# Patient Record
Sex: FEMALE | Race: BLACK OR AFRICAN AMERICAN | Employment: OTHER | URBAN - METROPOLITAN AREA
[De-identification: names, ages, dates, MRNs, and addresses within clinical notes are randomized per-mention and may not be internally consistent; named-entity substitution may affect disease eponyms.]

---

## 2022-08-26 ENCOUNTER — APPOINTMENT (OUTPATIENT)
Dept: CT IMAGING | Age: 62
DRG: 280 | End: 2022-08-26

## 2022-08-26 ENCOUNTER — HOSPITAL ENCOUNTER (INPATIENT)
Age: 62
LOS: 3 days | Discharge: ANOTHER HOSPITAL | DRG: 280 | End: 2022-08-29
Attending: EMERGENCY MEDICINE | Admitting: INTERNAL MEDICINE

## 2022-08-26 ENCOUNTER — APPOINTMENT (OUTPATIENT)
Dept: GENERAL RADIOLOGY | Age: 62
DRG: 280 | End: 2022-08-26

## 2022-08-26 DIAGNOSIS — I21.4 NSTEMI (NON-ST ELEVATED MYOCARDIAL INFARCTION) (HCC): Primary | ICD-10-CM

## 2022-08-26 LAB
ANION GAP SERPL CALCULATED.3IONS-SCNC: 15 MMOL/L (ref 7–16)
ANISOCYTOSIS: ABNORMAL
BASOPHILS ABSOLUTE: 0.05 E9/L (ref 0–0.2)
BASOPHILS RELATIVE PERCENT: 0.9 % (ref 0–2)
BUN BLDV-MCNC: 16 MG/DL (ref 6–23)
BURR CELLS: ABNORMAL
CALCIUM SERPL-MCNC: 9.1 MG/DL (ref 8.6–10.2)
CHLORIDE BLD-SCNC: 107 MMOL/L (ref 98–107)
CO2: 17 MMOL/L (ref 22–29)
CREAT SERPL-MCNC: 1.2 MG/DL (ref 0.5–1)
D DIMER: 1164 NG/ML DDU
EOSINOPHILS ABSOLUTE: 0.16 E9/L (ref 0.05–0.5)
EOSINOPHILS RELATIVE PERCENT: 2.6 % (ref 0–6)
GFR AFRICAN AMERICAN: 55
GFR NON-AFRICAN AMERICAN: 55 ML/MIN/1.73
GLUCOSE BLD-MCNC: 255 MG/DL (ref 74–99)
HCT VFR BLD CALC: 44.2 % (ref 34–48)
HCT VFR BLD CALC: 45.5 % (ref 34–48)
HEMOGLOBIN: 14.3 G/DL (ref 11.5–15.5)
HEMOGLOBIN: 14.5 G/DL (ref 11.5–15.5)
LYMPHOCYTES ABSOLUTE: 1.14 E9/L (ref 1.5–4)
LYMPHOCYTES RELATIVE PERCENT: 19.3 % (ref 20–42)
MCH RBC QN AUTO: 27.8 PG (ref 26–35)
MCH RBC QN AUTO: 27.8 PG (ref 26–35)
MCHC RBC AUTO-ENTMCNC: 31.9 % (ref 32–34.5)
MCHC RBC AUTO-ENTMCNC: 32.4 % (ref 32–34.5)
MCV RBC AUTO: 85.8 FL (ref 80–99.9)
MCV RBC AUTO: 87.2 FL (ref 80–99.9)
MONOCYTES ABSOLUTE: 0.12 E9/L (ref 0.1–0.95)
MONOCYTES RELATIVE PERCENT: 1.8 % (ref 2–12)
NEUTROPHILS ABSOLUTE: 4.5 E9/L (ref 1.8–7.3)
NEUTROPHILS RELATIVE PERCENT: 75.4 % (ref 43–80)
PDW BLD-RTO: 16.2 FL (ref 11.5–15)
PDW BLD-RTO: 16.9 FL (ref 11.5–15)
PLATELET # BLD: 326 E9/L (ref 130–450)
PLATELET # BLD: 336 E9/L (ref 130–450)
PMV BLD AUTO: 11 FL (ref 7–12)
PMV BLD AUTO: 11.3 FL (ref 7–12)
POIKILOCYTES: ABNORMAL
POLYCHROMASIA: ABNORMAL
POTASSIUM REFLEX MAGNESIUM: 4 MMOL/L (ref 3.5–5)
PRO-BNP: ABNORMAL PG/ML (ref 0–125)
RBC # BLD: 5.15 E12/L (ref 3.5–5.5)
RBC # BLD: 5.22 E12/L (ref 3.5–5.5)
REASON FOR REJECTION: NORMAL
REJECTED TEST: NORMAL
SODIUM BLD-SCNC: 139 MMOL/L (ref 132–146)
TARGET CELLS: ABNORMAL
TEAR DROP CELLS: ABNORMAL
TROPONIN, HIGH SENSITIVITY: 122 NG/L (ref 0–9)
TROPONIN, HIGH SENSITIVITY: 486 NG/L (ref 0–9)
WBC # BLD: 5.8 E9/L (ref 4.5–11.5)
WBC # BLD: 6 E9/L (ref 4.5–11.5)

## 2022-08-26 PROCEDURE — 83880 ASSAY OF NATRIURETIC PEPTIDE: CPT

## 2022-08-26 PROCEDURE — 83036 HEMOGLOBIN GLYCOSYLATED A1C: CPT

## 2022-08-26 PROCEDURE — 94644 CONT INHLJ TX 1ST HOUR: CPT

## 2022-08-26 PROCEDURE — 6360000004 HC RX CONTRAST MEDICATION: Performed by: RADIOLOGY

## 2022-08-26 PROCEDURE — 80076 HEPATIC FUNCTION PANEL: CPT

## 2022-08-26 PROCEDURE — 99223 1ST HOSP IP/OBS HIGH 75: CPT | Performed by: HOSPITALIST

## 2022-08-26 PROCEDURE — 84100 ASSAY OF PHOSPHORUS: CPT

## 2022-08-26 PROCEDURE — 85027 COMPLETE CBC AUTOMATED: CPT

## 2022-08-26 PROCEDURE — 2060000000 HC ICU INTERMEDIATE R&B

## 2022-08-26 PROCEDURE — 99285 EMERGENCY DEPT VISIT HI MDM: CPT

## 2022-08-26 PROCEDURE — 84484 ASSAY OF TROPONIN QUANT: CPT

## 2022-08-26 PROCEDURE — 80061 LIPID PANEL: CPT

## 2022-08-26 PROCEDURE — 6370000000 HC RX 637 (ALT 250 FOR IP): Performed by: EMERGENCY MEDICINE

## 2022-08-26 PROCEDURE — 71275 CT ANGIOGRAPHY CHEST: CPT

## 2022-08-26 PROCEDURE — 80048 BASIC METABOLIC PNL TOTAL CA: CPT

## 2022-08-26 PROCEDURE — 71045 X-RAY EXAM CHEST 1 VIEW: CPT

## 2022-08-26 PROCEDURE — 83735 ASSAY OF MAGNESIUM: CPT

## 2022-08-26 PROCEDURE — 85025 COMPLETE CBC W/AUTO DIFF WBC: CPT

## 2022-08-26 PROCEDURE — 85378 FIBRIN DEGRADE SEMIQUANT: CPT

## 2022-08-26 PROCEDURE — 93005 ELECTROCARDIOGRAM TRACING: CPT | Performed by: EMERGENCY MEDICINE

## 2022-08-26 PROCEDURE — 36415 COLL VENOUS BLD VENIPUNCTURE: CPT

## 2022-08-26 PROCEDURE — 85730 THROMBOPLASTIN TIME PARTIAL: CPT

## 2022-08-26 RX ORDER — ACETAMINOPHEN 650 MG/1
650 SUPPOSITORY RECTAL EVERY 6 HOURS PRN
Status: DISCONTINUED | OUTPATIENT
Start: 2022-08-26 | End: 2022-08-29 | Stop reason: HOSPADM

## 2022-08-26 RX ORDER — NITROGLYCERIN 0.4 MG/1
0.4 TABLET SUBLINGUAL EVERY 5 MIN PRN
Status: DISCONTINUED | OUTPATIENT
Start: 2022-08-26 | End: 2022-08-29 | Stop reason: HOSPADM

## 2022-08-26 RX ORDER — ONDANSETRON 2 MG/ML
4 INJECTION INTRAMUSCULAR; INTRAVENOUS EVERY 6 HOURS PRN
Status: DISCONTINUED | OUTPATIENT
Start: 2022-08-26 | End: 2022-08-29 | Stop reason: HOSPADM

## 2022-08-26 RX ORDER — HEPARIN SODIUM 1000 [USP'U]/ML
2000 INJECTION, SOLUTION INTRAVENOUS; SUBCUTANEOUS PRN
Status: DISCONTINUED | OUTPATIENT
Start: 2022-08-26 | End: 2022-08-29 | Stop reason: HOSPADM

## 2022-08-26 RX ORDER — ASPIRIN 81 MG/1
324 TABLET, CHEWABLE ORAL ONCE
Status: COMPLETED | OUTPATIENT
Start: 2022-08-26 | End: 2022-08-27

## 2022-08-26 RX ORDER — IPRATROPIUM BROMIDE AND ALBUTEROL SULFATE 2.5; .5 MG/3ML; MG/3ML
3 SOLUTION RESPIRATORY (INHALATION) ONCE
Status: COMPLETED | OUTPATIENT
Start: 2022-08-26 | End: 2022-08-26

## 2022-08-26 RX ORDER — ACETAMINOPHEN 325 MG/1
650 TABLET ORAL EVERY 6 HOURS PRN
Status: DISCONTINUED | OUTPATIENT
Start: 2022-08-26 | End: 2022-08-29 | Stop reason: HOSPADM

## 2022-08-26 RX ORDER — HEPARIN SODIUM 1000 [USP'U]/ML
4000 INJECTION, SOLUTION INTRAVENOUS; SUBCUTANEOUS PRN
Status: DISCONTINUED | OUTPATIENT
Start: 2022-08-26 | End: 2022-08-29 | Stop reason: HOSPADM

## 2022-08-26 RX ORDER — HEPARIN SODIUM 10000 [USP'U]/100ML
5-30 INJECTION, SOLUTION INTRAVENOUS CONTINUOUS
Status: DISCONTINUED | OUTPATIENT
Start: 2022-08-26 | End: 2022-08-27 | Stop reason: SDUPTHER

## 2022-08-26 RX ORDER — ONDANSETRON 4 MG/1
4 TABLET, ORALLY DISINTEGRATING ORAL EVERY 8 HOURS PRN
Status: DISCONTINUED | OUTPATIENT
Start: 2022-08-26 | End: 2022-08-29 | Stop reason: HOSPADM

## 2022-08-26 RX ORDER — ATORVASTATIN CALCIUM 40 MG/1
80 TABLET, FILM COATED ORAL NIGHTLY
Status: DISCONTINUED | OUTPATIENT
Start: 2022-08-26 | End: 2022-08-29 | Stop reason: HOSPADM

## 2022-08-26 RX ORDER — HEPARIN SODIUM 1000 [USP'U]/ML
4000 INJECTION, SOLUTION INTRAVENOUS; SUBCUTANEOUS ONCE
Status: COMPLETED | OUTPATIENT
Start: 2022-08-26 | End: 2022-08-27

## 2022-08-26 RX ADMIN — IOPAMIDOL 75 ML: 755 INJECTION, SOLUTION INTRAVENOUS at 20:53

## 2022-08-26 RX ADMIN — IPRATROPIUM BROMIDE AND ALBUTEROL SULFATE 3 AMPULE: .5; 3 SOLUTION RESPIRATORY (INHALATION) at 17:44

## 2022-08-26 ASSESSMENT — ENCOUNTER SYMPTOMS
RHINORRHEA: 0
COUGH: 0
SHORTNESS OF BREATH: 1
BLOOD IN STOOL: 0
COLOR CHANGE: 0
NAUSEA: 0
VOMITING: 0
PHOTOPHOBIA: 0
ABDOMINAL PAIN: 0
DIARRHEA: 0

## 2022-08-26 ASSESSMENT — PAIN DESCRIPTION - ORIENTATION: ORIENTATION: RIGHT;LEFT

## 2022-08-26 ASSESSMENT — PAIN - FUNCTIONAL ASSESSMENT
PAIN_FUNCTIONAL_ASSESSMENT: NONE - DENIES PAIN
PAIN_FUNCTIONAL_ASSESSMENT: 0-10

## 2022-08-26 ASSESSMENT — PAIN DESCRIPTION - DESCRIPTORS: DESCRIPTORS: TINGLING

## 2022-08-26 ASSESSMENT — PAIN DESCRIPTION - LOCATION: LOCATION: CHEST;LEG

## 2022-08-26 NOTE — ED NOTES
EKG completed at this time in H4. PT o2 sats 86-88% pt moved into room 6 by this tech and placed on 2 liters of 02 nasal cannula at this time o2 sats 98% at this time. PT placed on monitor and continuous telemetry at this time.       Lane Ratel  08/26/22 [phone]

## 2022-08-26 NOTE — ED PROVIDER NOTES
Presents to the ED for evaluation of shortness of breath. Patient states that over the past 2 to 3 days she has been getting more short of breath. Patient denies the presence of chest pain. She denies cough. Denies fever or chills. She denies history of COPD but states she was a smoker. She quit last month. Symptoms moderate in severity. Does not wear supplemental oxygen at home. Symptoms worse with exertion and improved with rest.  She states that she lays on her side and does not report being short of breath when she does so. She denied any abdominal pain, nausea, vomiting or diarrhea to me. Denies any recent travel or history of blood clots. She does report new swelling in her lower extremities. Denies any calf tenderness. No history of blood clots       Review of Systems   Constitutional:  Positive for fatigue. Negative for chills, diaphoresis and fever. HENT:  Negative for congestion and rhinorrhea. Eyes:  Negative for photophobia and visual disturbance. Respiratory:  Positive for shortness of breath. Negative for cough. Cardiovascular:  Positive for leg swelling. Negative for chest pain and palpitations. Gastrointestinal:  Negative for abdominal pain, blood in stool, diarrhea, nausea and vomiting. Genitourinary:  Negative for decreased urine volume, difficulty urinating and hematuria. Musculoskeletal:  Negative for arthralgias and myalgias. Skin:  Positive for pallor (per the patient). Negative for color change. Neurological:  Negative for dizziness, syncope, weakness, light-headedness and numbness. Hematological:  Does not bruise/bleed easily. Psychiatric/Behavioral:  Negative for confusion. All other systems reviewed and are negative. Physical Exam  Vitals and nursing note reviewed. Constitutional:       General: She is not in acute distress. Appearance: She is well-developed and normal weight. She is not ill-appearing or diaphoretic.    HENT:      Head: Normocephalic and atraumatic. Eyes:      Conjunctiva/sclera: Conjunctivae normal.   Cardiovascular:      Rate and Rhythm: Regular rhythm. Tachycardia present. Heart sounds: Normal heart sounds. No murmur heard. Pulmonary:      Effort: Pulmonary effort is normal. No respiratory distress (No accessory muscle use, conversational dyspnea, or tachypnea. ). Breath sounds: Examination of the right-middle field reveals rales. Examination of the left-middle field reveals rales. Examination of the right-lower field reveals rales. Examination of the left-lower field reveals rales. Rales present. No decreased breath sounds, wheezing or rhonchi. Abdominal:      General: Bowel sounds are normal.      Palpations: Abdomen is soft. Tenderness: There is no abdominal tenderness. There is no guarding or rebound. Musculoskeletal:      Cervical back: Normal range of motion and neck supple. Comments: 1-2+ pitting edema bilateral lower extremities. No calf tenderness or increased warmth to palpation. Skin:     General: Skin is warm and dry. Coloration: Skin is not cyanotic or pale. Neurological:      Mental Status: She is alert and oriented to person, place, and time. Coordination: Coordination normal.        Procedures     MDM  Presented to the ED for evaluation of shortness of breath. Symptoms of been gradually worsening over the past 2 to 3 days. No prior history of COPD but does have a prior history of smoking. Labs and imaging were assessed. CBC showed no evidence of anemia or leukocytosis. BMP showed no electrolyte abnormalities. Mild renal insufficiency with a creatinine of 1.2. Patient's initial troponin was elevated 122. A repeat troponin was obtained and was 486. Patient was started on low-dose heparin protocol. While the patient was here he also had an elevated D-dimer at 1164. CT of the chest was then ordered which showed no evidence of PE or acute pulmonary abnormality.   I did consult medicine and cardiology. Cardiology was agreeable to the plan and is okay with the patient staying here. They will consult in the morning. She has been chest pain-free while in the ED. Has no symptoms at this time. EKG Interpretation    Interpreted by emergency department physician    Rhythm: sinus tachycardia  Rate: 105  Axis: left  Ectopy: none  Conduction: Possible left atrial enlargement  ST Segments: normal  T Waves: normal  Q Waves: none    Clinical Impression: sinus tachycardia, no prior EKGs for comparison    Rossy Seaman DO      ED Course as of 08/26/22 2311   Fri Aug 26, 2022 2033 Discussed with patient that her initial troponin was elevated. We are waiting repeat troponin. She has no chest pain. Feels better after breathing treatment. [MS]   1029 Resting in bed no distress. Has no complaint this time. Discussed results of labs thus far. Waiting CTA of chest. [MS]      ED Course User Index  [MS] Rossy Seaman DO       --------------------------------------------- PAST HISTORY ---------------------------------------------  Past Medical History:  has no past medical history on file. Past Surgical History:  has no past surgical history on file. Social History:  reports that she has been smoking cigarettes. She does not have any smokeless tobacco history on file. She reports that she does not currently use alcohol. She reports that she does not currently use drugs. Family History: family history is not on file. The patients home medications have been reviewed. Allergies: Patient has no known allergies.     -------------------------------------------------- RESULTS -------------------------------------------------    LABS:  Results for orders placed or performed during the hospital encounter of 08/26/22   CBC with Auto Differential   Result Value Ref Range    WBC 6.0 4.5 - 11.5 E9/L    RBC 5.22 3.50 - 5.50 E12/L    Hemoglobin 14.5 11.5 - 15.5 g/dL    Hematocrit 45.5 congestion.                   ------------------------- NURSING NOTES AND VITALS REVIEWED ---------------------------  Date / Time Roomed:  8/26/2022  5:13 PM  ED Bed Assignment:  06/06    The nursing notes within the ED encounter and vital signs as below have been reviewed.      Patient Vitals for the past 24 hrs:   BP Temp Temp src Pulse Resp SpO2 Height Weight   08/26/22 2258 (!) 124/104 -- -- 95 22 99 % -- --   08/26/22 2219 -- -- -- 96 -- 96 % -- --   08/26/22 2149 -- -- -- 96 -- 98 % -- --   08/26/22 2143 (!) 174/129 -- -- 99 -- 97 % -- --   08/26/22 2119 -- -- -- 97 -- 99 % -- --   08/26/22 2115 (!) 182/123 -- -- 96 18 98 % -- --   08/26/22 2113 (!) 182/123 -- -- 98 18 (!) 85 % -- --   08/26/22 2049 -- -- -- 98 -- 94 % -- --   08/26/22 2043 -- -- -- 99 -- 90 % -- --   08/26/22 2013 -- -- -- 100 -- 94 % -- --   08/26/22 2000 (!) 186/133 -- -- 100 22 98 % 5' (1.524 m) 165 lb (74.8 kg)   08/26/22 1943 -- -- -- (!) 101 19 93 % -- --   08/26/22 1913 -- -- -- (!) 101 -- 92 % -- --   08/26/22 1900 -- -- -- (!) 103 (!) 48 92 % -- --   08/26/22 1845 -- -- -- (!) 104 (!) 35 (!) 89 % -- --   08/26/22 1830 -- -- -- (!) 102 27 96 % -- --   08/26/22 1815 -- -- -- (!) 104 (!) 53 92 % -- --   08/26/22 1801 (!) 184/137 -- -- (!) 106 (!) 57 95 % -- --   08/26/22 1800 -- -- -- (!) 105 27 98 % -- --   08/26/22 1745 -- -- -- (!) 105 (!) 34 99 % -- --   08/26/22 1730 -- -- -- (!) 105 (!) 34 (!) 88 % -- --   08/26/22 1727 (!) 195/117 98.6 °F (37 °C) Oral (!) 107 20 90 % -- --   08/26/22 1714 (!) 181/102 -- -- -- 25 -- -- 165 lb (74.8 kg)   08/26/22 1656 -- 97.6 °F (36.4 °C) Oral (!) 109 -- 98 % -- --       Oxygen Saturation Interpretation: Normal    ------------------------------------------ PROGRESS NOTES ------------------------------------------  Re-evaluation(s):  Refer to ED course above    Counseling:  I have spoken with the patient and discussed todays results, in addition to providing specific details for the plan of care and counseling regarding the diagnosis and prognosis. Their questions are answered at this time and they are agreeable with the plan of admission. Critical care:  Please note that the withdrawal or failure to initiate urgent interventions for this patient would likely result in a life threatening deterioration or permanent disability. Systems at risk for deterioration include: NSTEMI requiring heparin bolus and infusion    Accordingly this patient received 35 minutes of critical care time, excluding separately billable procedures. --------------------------------- ADDITIONAL PROVIDER NOTES ---------------------------------  Consultations:  Time: 9745. Spoke with Dr. Mae. Discussed case. He will admit the patient. Time: 2305. Spoke with Dr. Rosa Nunez (Cardiology). Discussed case. He is agreeable to the patient staying here and they will consult her in the morning. This patient's ED course included: a personal history and physicial examination, re-evaluation prior to disposition, multiple bedside re-evaluations, IV medications, cardiac monitoring, continuous pulse oximetry, and complex medical decision making and emergency management    This patient has remained hemodynamically stable during their ED course. Diagnosis:  1. NSTEMI (non-ST elevated myocardial infarction) ([facility] [address]        Disposition:  Patient's disposition: Admit to [facility]  Patient's condition is fair.           Camden Newton DO  08/26/22 1126

## 2022-08-27 LAB
ALBUMIN SERPL-MCNC: 3.6 G/DL (ref 3.5–5.2)
ALP BLD-CCNC: 112 U/L (ref 35–104)
ALT SERPL-CCNC: 23 U/L (ref 0–32)
ANION GAP SERPL CALCULATED.3IONS-SCNC: 15 MMOL/L (ref 7–16)
APTT: 100.4 SEC (ref 24.5–35.1)
APTT: 28.2 SEC (ref 24.5–35.1)
APTT: 97.4 SEC (ref 24.5–35.1)
AST SERPL-CCNC: 51 U/L (ref 0–31)
BASOPHILS ABSOLUTE: 0.04 E9/L (ref 0–0.2)
BASOPHILS RELATIVE PERCENT: 0.6 % (ref 0–2)
BILIRUB SERPL-MCNC: 0.9 MG/DL (ref 0–1.2)
BILIRUBIN DIRECT: 0.4 MG/DL (ref 0–0.3)
BILIRUBIN, INDIRECT: 0.5 MG/DL (ref 0–1)
BUN BLDV-MCNC: 14 MG/DL (ref 6–23)
CALCIUM SERPL-MCNC: 9 MG/DL (ref 8.6–10.2)
CHLORIDE BLD-SCNC: 105 MMOL/L (ref 98–107)
CHOLESTEROL, TOTAL: 163 MG/DL (ref 0–199)
CO2: 18 MMOL/L (ref 22–29)
CREAT SERPL-MCNC: 0.9 MG/DL (ref 0.5–1)
EKG ATRIAL RATE: 105 BPM
EKG P AXIS: 33 DEGREES
EKG P-R INTERVAL: 152 MS
EKG Q-T INTERVAL: 324 MS
EKG QRS DURATION: 74 MS
EKG QTC CALCULATION (BAZETT): 428 MS
EKG R AXIS: -26 DEGREES
EKG T AXIS: 92 DEGREES
EKG VENTRICULAR RATE: 105 BPM
EOSINOPHILS ABSOLUTE: 0 E9/L (ref 0.05–0.5)
EOSINOPHILS RELATIVE PERCENT: 0 % (ref 0–6)
GFR AFRICAN AMERICAN: >60
GFR NON-AFRICAN AMERICAN: >60 ML/MIN/1.73
GLUCOSE BLD-MCNC: 157 MG/DL (ref 74–99)
HBA1C MFR BLD: 7.1 % (ref 4–5.6)
HCT VFR BLD CALC: 42.8 % (ref 34–48)
HDLC SERPL-MCNC: 29 MG/DL
HEMOGLOBIN: 14.3 G/DL (ref 11.5–15.5)
IMMATURE GRANULOCYTES #: 0.03 E9/L
IMMATURE GRANULOCYTES %: 0.4 % (ref 0–5)
LDL CHOLESTEROL CALCULATED: 113 MG/DL (ref 0–99)
LV EF: 10 %
LVEF MODALITY: NORMAL
LYMPHOCYTES ABSOLUTE: 1.46 E9/L (ref 1.5–4)
LYMPHOCYTES RELATIVE PERCENT: 20.7 % (ref 20–42)
MAGNESIUM: 2 MG/DL (ref 1.6–2.6)
MCH RBC QN AUTO: 28.3 PG (ref 26–35)
MCHC RBC AUTO-ENTMCNC: 33.4 % (ref 32–34.5)
MCV RBC AUTO: 84.6 FL (ref 80–99.9)
MONOCYTES ABSOLUTE: 0.69 E9/L (ref 0.1–0.95)
MONOCYTES RELATIVE PERCENT: 9.8 % (ref 2–12)
NEUTROPHILS ABSOLUTE: 4.84 E9/L (ref 1.8–7.3)
NEUTROPHILS RELATIVE PERCENT: 68.5 % (ref 43–80)
PDW BLD-RTO: 16.3 FL (ref 11.5–15)
PHOSPHORUS: 4.5 MG/DL (ref 2.5–4.5)
PLATELET # BLD: 315 E9/L (ref 130–450)
PMV BLD AUTO: 11.3 FL (ref 7–12)
POTASSIUM REFLEX MAGNESIUM: 3.9 MMOL/L (ref 3.5–5)
RBC # BLD: 5.06 E12/L (ref 3.5–5.5)
SODIUM BLD-SCNC: 138 MMOL/L (ref 132–146)
TOTAL PROTEIN: 8.5 G/DL (ref 6.4–8.3)
TRIGL SERPL-MCNC: 107 MG/DL (ref 0–149)
TROPONIN, HIGH SENSITIVITY: 436 NG/L (ref 0–9)
TROPONIN, HIGH SENSITIVITY: 584 NG/L (ref 0–9)
VLDLC SERPL CALC-MCNC: 21 MG/DL
WBC # BLD: 7.1 E9/L (ref 4.5–11.5)

## 2022-08-27 PROCEDURE — 6370000000 HC RX 637 (ALT 250 FOR IP): Performed by: EMERGENCY MEDICINE

## 2022-08-27 PROCEDURE — 93306 TTE W/DOPPLER COMPLETE: CPT

## 2022-08-27 PROCEDURE — 36415 COLL VENOUS BLD VENIPUNCTURE: CPT

## 2022-08-27 PROCEDURE — 85730 THROMBOPLASTIN TIME PARTIAL: CPT

## 2022-08-27 PROCEDURE — 6370000000 HC RX 637 (ALT 250 FOR IP): Performed by: INTERNAL MEDICINE

## 2022-08-27 PROCEDURE — 6360000002 HC RX W HCPCS: Performed by: INTERNAL MEDICINE

## 2022-08-27 PROCEDURE — 84484 ASSAY OF TROPONIN QUANT: CPT

## 2022-08-27 PROCEDURE — 6360000004 HC RX CONTRAST MEDICATION: Performed by: HOSPITALIST

## 2022-08-27 PROCEDURE — 80048 BASIC METABOLIC PNL TOTAL CA: CPT

## 2022-08-27 PROCEDURE — 2500000003 HC RX 250 WO HCPCS: Performed by: HOSPITALIST

## 2022-08-27 PROCEDURE — 6360000002 HC RX W HCPCS: Performed by: EMERGENCY MEDICINE

## 2022-08-27 PROCEDURE — 2580000003 HC RX 258: Performed by: HOSPITALIST

## 2022-08-27 PROCEDURE — 6360000002 HC RX W HCPCS: Performed by: HOSPITALIST

## 2022-08-27 PROCEDURE — 99233 SBSQ HOSP IP/OBS HIGH 50: CPT | Performed by: INTERNAL MEDICINE

## 2022-08-27 PROCEDURE — 85025 COMPLETE CBC W/AUTO DIFF WBC: CPT

## 2022-08-27 PROCEDURE — 2060000000 HC ICU INTERMEDIATE R&B

## 2022-08-27 PROCEDURE — 6370000000 HC RX 637 (ALT 250 FOR IP): Performed by: HOSPITALIST

## 2022-08-27 RX ORDER — SODIUM CHLORIDE 0.9 % (FLUSH) 0.9 %
5-40 SYRINGE (ML) INJECTION EVERY 12 HOURS SCHEDULED
Status: DISCONTINUED | OUTPATIENT
Start: 2022-08-27 | End: 2022-08-29 | Stop reason: HOSPADM

## 2022-08-27 RX ORDER — MAGNESIUM SULFATE IN WATER 40 MG/ML
2000 INJECTION, SOLUTION INTRAVENOUS PRN
Status: DISCONTINUED | OUTPATIENT
Start: 2022-08-27 | End: 2022-08-29 | Stop reason: HOSPADM

## 2022-08-27 RX ORDER — AMLODIPINE BESYLATE 10 MG/1
20 TABLET ORAL DAILY
COMMUNITY
End: 2022-08-30

## 2022-08-27 RX ORDER — CALCIUM CARBONATE 200(500)MG
500 TABLET,CHEWABLE ORAL 3 TIMES DAILY PRN
Status: DISCONTINUED | OUTPATIENT
Start: 2022-08-27 | End: 2022-08-29 | Stop reason: HOSPADM

## 2022-08-27 RX ORDER — HEPARIN SODIUM 10000 [USP'U]/100ML
5-30 INJECTION, SOLUTION INTRAVENOUS CONTINUOUS
Status: DISCONTINUED | OUTPATIENT
Start: 2022-08-27 | End: 2022-08-29 | Stop reason: HOSPADM

## 2022-08-27 RX ORDER — POTASSIUM CHLORIDE 20 MEQ/1
40 TABLET, EXTENDED RELEASE ORAL PRN
Status: DISCONTINUED | OUTPATIENT
Start: 2022-08-27 | End: 2022-08-29 | Stop reason: HOSPADM

## 2022-08-27 RX ORDER — HYDRALAZINE HYDROCHLORIDE 20 MG/ML
10 INJECTION INTRAMUSCULAR; INTRAVENOUS EVERY 4 HOURS PRN
Status: DISCONTINUED | OUTPATIENT
Start: 2022-08-27 | End: 2022-08-28

## 2022-08-27 RX ORDER — SODIUM CHLORIDE 0.9 % (FLUSH) 0.9 %
5-40 SYRINGE (ML) INJECTION PRN
Status: DISCONTINUED | OUTPATIENT
Start: 2022-08-27 | End: 2022-08-29 | Stop reason: HOSPADM

## 2022-08-27 RX ORDER — FUROSEMIDE 10 MG/ML
40 INJECTION INTRAMUSCULAR; INTRAVENOUS 2 TIMES DAILY
Status: DISCONTINUED | OUTPATIENT
Start: 2022-08-27 | End: 2022-08-29 | Stop reason: HOSPADM

## 2022-08-27 RX ORDER — SODIUM CHLORIDE 9 MG/ML
INJECTION, SOLUTION INTRAVENOUS PRN
Status: DISCONTINUED | OUTPATIENT
Start: 2022-08-27 | End: 2022-08-29 | Stop reason: HOSPADM

## 2022-08-27 RX ORDER — MECOBALAMIN 5000 MCG
5 TABLET,DISINTEGRATING ORAL NIGHTLY PRN
Status: DISCONTINUED | OUTPATIENT
Start: 2022-08-27 | End: 2022-08-29 | Stop reason: HOSPADM

## 2022-08-27 RX ORDER — POLYETHYLENE GLYCOL 3350 17 G/17G
17 POWDER, FOR SOLUTION ORAL DAILY PRN
Status: DISCONTINUED | OUTPATIENT
Start: 2022-08-27 | End: 2022-08-29 | Stop reason: HOSPADM

## 2022-08-27 RX ORDER — SIMVASTATIN 20 MG
20 TABLET ORAL NIGHTLY
COMMUNITY
End: 2022-08-30

## 2022-08-27 RX ORDER — ASPIRIN 81 MG/1
81 TABLET, CHEWABLE ORAL DAILY
Status: DISCONTINUED | OUTPATIENT
Start: 2022-08-28 | End: 2022-08-29 | Stop reason: HOSPADM

## 2022-08-27 RX ORDER — POTASSIUM CHLORIDE 7.45 MG/ML
10 INJECTION INTRAVENOUS PRN
Status: DISCONTINUED | OUTPATIENT
Start: 2022-08-27 | End: 2022-08-29 | Stop reason: HOSPADM

## 2022-08-27 RX ORDER — CARVEDILOL 6.25 MG/1
6.25 TABLET ORAL 2 TIMES DAILY WITH MEALS
Status: DISCONTINUED | OUTPATIENT
Start: 2022-08-27 | End: 2022-08-29 | Stop reason: HOSPADM

## 2022-08-27 RX ORDER — FUROSEMIDE 10 MG/ML
20 INJECTION INTRAMUSCULAR; INTRAVENOUS 2 TIMES DAILY
Status: DISCONTINUED | OUTPATIENT
Start: 2022-08-27 | End: 2022-08-27

## 2022-08-27 RX ORDER — AMLODIPINE BESYLATE 5 MG/1
10 TABLET ORAL DAILY
Status: DISCONTINUED | OUTPATIENT
Start: 2022-08-27 | End: 2022-08-28

## 2022-08-27 RX ORDER — LABETALOL HYDROCHLORIDE 5 MG/ML
10 INJECTION, SOLUTION INTRAVENOUS EVERY 4 HOURS PRN
Status: DISCONTINUED | OUTPATIENT
Start: 2022-08-27 | End: 2022-08-28

## 2022-08-27 RX ADMIN — AMLODIPINE BESYLATE 10 MG: 5 TABLET ORAL at 11:39

## 2022-08-27 RX ADMIN — ATORVASTATIN CALCIUM 80 MG: 40 TABLET, FILM COATED ORAL at 22:01

## 2022-08-27 RX ADMIN — FUROSEMIDE 40 MG: 10 INJECTION, SOLUTION INTRAMUSCULAR; INTRAVENOUS at 18:05

## 2022-08-27 RX ADMIN — Medication 10 ML: at 09:00

## 2022-08-27 RX ADMIN — FUROSEMIDE 20 MG: 10 INJECTION, SOLUTION INTRAMUSCULAR; INTRAVENOUS at 09:54

## 2022-08-27 RX ADMIN — PERFLUTREN 1.65 MG: 6.52 INJECTION, SUSPENSION INTRAVENOUS at 14:09

## 2022-08-27 RX ADMIN — CARVEDILOL 6.25 MG: 6.25 TABLET, FILM COATED ORAL at 18:05

## 2022-08-27 RX ADMIN — ATORVASTATIN CALCIUM 80 MG: 40 TABLET, FILM COATED ORAL at 00:44

## 2022-08-27 RX ADMIN — Medication 10 ML: at 22:01

## 2022-08-27 RX ADMIN — LABETALOL HYDROCHLORIDE 10 MG: 5 INJECTION, SOLUTION INTRAVENOUS at 04:49

## 2022-08-27 RX ADMIN — SACUBITRIL AND VALSARTAN 1 TABLET: 24; 26 TABLET, FILM COATED ORAL at 18:05

## 2022-08-27 RX ADMIN — HYDRALAZINE HYDROCHLORIDE 10 MG: 20 INJECTION INTRAMUSCULAR; INTRAVENOUS at 09:53

## 2022-08-27 RX ADMIN — Medication 11 UNITS/KG/HR: at 23:35

## 2022-08-27 RX ADMIN — LABETALOL HYDROCHLORIDE 10 MG: 5 INJECTION, SOLUTION INTRAVENOUS at 13:07

## 2022-08-27 RX ADMIN — HYDRALAZINE HYDROCHLORIDE 10 MG: 20 INJECTION INTRAMUSCULAR; INTRAVENOUS at 03:51

## 2022-08-27 RX ADMIN — Medication 11 UNITS/KG/HR: at 00:50

## 2022-08-27 RX ADMIN — ASPIRIN 81 MG CHEWABLE TABLET 324 MG: 81 TABLET CHEWABLE at 00:45

## 2022-08-27 RX ADMIN — HEPARIN SODIUM 4000 UNITS: 1000 INJECTION INTRAVENOUS; SUBCUTANEOUS at 00:35

## 2022-08-27 ASSESSMENT — PAIN SCALES - GENERAL: PAINLEVEL_OUTOF10: 0

## 2022-08-27 ASSESSMENT — ENCOUNTER SYMPTOMS
NAUSEA: 1
VOMITING: 1
SHORTNESS OF BREATH: 1

## 2022-08-27 NOTE — PROGRESS NOTES
Returned to the pt room to do echo with definity per Dr Leonard Valentin request. Srinivasan Bowles rn gave 2 cc definity

## 2022-08-27 NOTE — PROGRESS NOTES
Department of Internal Medicine  General Internal Medicine  Attending Progress Note  Chief Complaint   Patient presents with    Shortness of Breath     PALE/ X 2 DAYS/ SLIGHT VOMITING     SUBJECTIVE:    Reports that she feels fine. Noted that she is comfortable except mild dyspnea when she stands up. She denied any chest pain. We did discuss treatment plans.      OBJECTIVE      Medications    Current Facility-Administered Medications: sodium chloride flush 0.9 % injection 5-40 mL, 5-40 mL, IntraVENous, 2 times per day  sodium chloride flush 0.9 % injection 5-40 mL, 5-40 mL, IntraVENous, PRN  0.9 % sodium chloride infusion, , IntraVENous, PRN  [START ON 8/28/2022] aspirin chewable tablet 81 mg, 81 mg, Oral, Daily  magnesium sulfate 2000 mg in 50 mL IVPB premix, 2,000 mg, IntraVENous, PRN  potassium chloride (KLOR-CON M) extended release tablet 40 mEq, 40 mEq, Oral, PRN **OR** potassium bicarb-citric acid (EFFER-K) effervescent tablet 40 mEq, 40 mEq, Oral, PRN **OR** potassium chloride 10 mEq/100 mL IVPB (Peripheral Line), 10 mEq, IntraVENous, PRN  perflutren lipid microspheres (DEFINITY) injection 1.65 mg, 1.5 mL, IntraVENous, ONCE PRN  polyethylene glycol (GLYCOLAX) packet 17 g, 17 g, Oral, Daily PRN  melatonin disintegrating tablet 5 mg, 5 mg, Oral, Nightly PRN  calcium carbonate (TUMS) chewable tablet 500 mg, 500 mg, Oral, TID PRN  furosemide (LASIX) injection 20 mg, 20 mg, IntraVENous, BID  heparin 25,000 units in dextrose 5% 250 mL (premix) infusion, 5-30 Units/kg/hr, IntraVENous, Continuous  hydrALAZINE (APRESOLINE) injection 10 mg, 10 mg, IntraVENous, Q4H PRN  labetalol (NORMODYNE;TRANDATE) injection 10 mg, 10 mg, IntraVENous, Q4H PRN  amLODIPine (NORVASC) tablet 10 mg, 10 mg, Oral, Daily  heparin (porcine) injection 4,000 Units, 4,000 Units, IntraVENous, PRN  heparin (porcine) injection 2,000 Units, 2,000 Units, IntraVENous, PRN  ondansetron (ZOFRAN-ODT) disintegrating tablet 4 mg, 4 mg, Oral, Q8H PRN **OR** ondansetron (ZOFRAN) injection 4 mg, 4 mg, IntraVENous, Q6H PRN  acetaminophen (TYLENOL) tablet 650 mg, 650 mg, Oral, Q6H PRN **OR** acetaminophen (TYLENOL) suppository 650 mg, 650 mg, Rectal, Q6H PRN  atorvastatin (LIPITOR) tablet 80 mg, 80 mg, Oral, Nightly  nitroGLYCERIN (NITROSTAT) SL tablet 0.4 mg, 0.4 mg, SubLINGual, Q5 Min PRN  Physical    VITALS:  BP (!) 172/104   Pulse 87   Temp 97.7 °F (36.5 °C) (Oral)   Resp 18   Ht 5' (1.524 m)   Wt 200 lb 9.9 oz (91 kg)   SpO2 100%   BMI 39.18 kg/m²   CONSTITUTIONAL:  awake, alert, and cooperative  EYES:  extra-ocular muscles intact and vision intact  ENT:  normocepalic, without obvious abnormality  NECK:  supple, symmetrical, trachea midline  BACK:  symmetric  LUNGS:  no increased work of breathing, no retractions, and clear to auscultation  CARDIOVASCULAR:  normal apical pulses and normal S1 and S2  ABDOMEN:  normal bowel sounds, non-distended, and non-tender  MUSCULOSKELETAL:  bilateral lower extremity edema  NEUROLOGIC:  Mental Status Exam:  Level of Alertness:   awake  Orientation:   person, place, time  SKIN:  no bruising or bleeding  Data    CBC:   Lab Results   Component Value Date/Time    WBC 7.1 08/27/2022 10:20 AM    RBC 5.06 08/27/2022 10:20 AM    HGB 14.3 08/27/2022 10:20 AM    HCT 42.8 08/27/2022 10:20 AM    MCV 84.6 08/27/2022 10:20 AM    MCH 28.3 08/27/2022 10:20 AM    MCHC 33.4 08/27/2022 10:20 AM    RDW 16.3 08/27/2022 10:20 AM     08/27/2022 10:20 AM    MPV 11.3 08/27/2022 10:20 AM     BMP:    Lab Results   Component Value Date/Time     08/27/2022 10:20 AM    K 3.9 08/27/2022 10:20 AM     08/27/2022 10:20 AM    CO2 18 08/27/2022 10:20 AM    BUN 14 08/27/2022 10:20 AM    LABALBU 3.6 08/26/2022 11:41 PM    CREATININE 0.9 08/27/2022 10:20 AM    CALCIUM 9.0 08/27/2022 10:20 AM    GFRAA >60 08/27/2022 10:20 AM    LABGLOM >60 08/27/2022 10:20 AM    GLUCOSE 157 08/27/2022 10:20 AM       ASSESSMENT AND PLAN      NSTEMI (non-ST elevated myocardial infarction): significant elevated troponin. On heparin drip cardiology consult. Echo ordered  Poorly controlled Hypertension: increase lasix dose to 40 mg BID. Continue amlodipine at current dose. No need to start B-Blocker now since patient is on acute CHF. Hold off also on ACEI due to need to possible enteresto at discharge. Congestive heart failure with combined diastolic and systolic dysfunction: EF noted to be 10%. Noted diastolic dysfunction. Increase lasix 40 40 mg BID. Monitor I and O. Check BNP Q3Days. Monitor daily weight  Morbid obesity: life style modification  Check A1C Tsh and   Mild OLIMPIA: ? Cardiorenal. Improved. Creatinine is WNL now.

## 2022-08-27 NOTE — CONSULTS
CHIEF COMPLAINT: Chest pain. HISTORY OF PRESENT ILLNESS: Patient is a 59 y.o. female who is new to our service. Mrs. Gabriel Julian is a pleasant 59year old  [place] lady from home. She states that she has had 2 days of dyspnea and that she had thought this was just indigestion. She states that she is sure it was just indigestion in the past. She states that she has been on medication for hypertension and hypercholesterolemia in the past.  She has been infrequent with her follow-ups with her PCP. She is a former tobacco using lady having quit on June 16th of this year (2022). She had had her first cigarette at age 22 years, for 42 years she had averaged 1/4 PPD for 10.5 pack-years. She denies any use of crack or cocaine or methamphetamine or amphetamine or any stimulant containing weight loss drugs. She underwent an echocardiogram that is summarized below. Troponins were found to be elevated and I was consulted for further recommendations. She denies any chest pains. She states that she had viral symptoms about a month ago with severe cough. She was not tested for COVID. She states that she started experiencing dyspnea which has gradually progressed since then. She is also complaining of weight gain and orthopnea.     Past Medical History:   Diagnosis Date    Hyperlipidemia     Hypertension     Tobacco abuse, in remission        Patient Active Problem List   Diagnosis    NSTEMI (non-ST elevated myocardial infarction) ([facility] [address]       Not on File    Current Facility-Administered Medications   Medication Dose Route Frequency Provider Last Rate Last Admin    sodium chloride flush 0.9 % injection 5-40 mL  5-40 mL IntraVENous 2 times per day Mel Saucedo MD        sodium chloride flush 0.9 % injection 5-40 mL  5-40 mL IntraVENous PRN Mel Saucedo MD        0.9 % sodium chloride infusion   IntraVENous PRBRANDON Saucedo MD        [START ON 8/28/2022] aspirin chewable tablet 81 mg  81 mg Oral Daily Hitesh Rodriguez MD        magnesium sulfate 2000 mg in 50 mL IVPB premix  2,000 mg IntraVENous PRN Hitesh Rodriguez MD        potassium chloride (KLOR-CON M) extended release tablet 40 mEq  40 mEq Oral PRN Hitesh Rodriguez MD        Or    potassium bicarb-citric acid (EFFER-K) effervescent tablet 40 mEq  40 mEq Oral PRN Hitesh Rodriguez MD        Or    potassium chloride 10 mEq/100 mL IVPB (Peripheral Line)  10 mEq IntraVENous PRN Hitesh Rodriguez MD        perflutren lipid microspheres (DEFINITY) injection 1.65 mg  1.5 mL IntraVENous ONCE PRN Hitesh Rodriguez MD        polyethylene glycol (GLYCOLAX) packet 17 g  17 g Oral Daily PRN Hitesh Rodriguez MD        melatonin disintegrating tablet 5 mg  5 mg Oral Nightly PRN Hitesh Rodriguez MD        calcium carbonate (TUMS) chewable tablet 500 mg  500 mg Oral TID PRN Hitesh Rodriguez MD        furosemide (LASIX) injection 20 mg  20 mg IntraVENous BID Hitesh Rodriguez MD   20 mg at 08/27/22 0954    heparin 25,000 units in dextrose 5% 250 mL (premix) infusion  5-30 Units/kg/hr IntraVENous Continuous Franco Hinkle DO 10 mL/hr at 08/27/22 0050 11 Units/kg/hr at 08/27/22 0050    hydrALAZINE (APRESOLINE) injection 10 mg  10 mg IntraVENous Q4H PRN Hitesh Rodriguez MD   10 mg at 08/27/22 0953    labetalol (NORMODYNE;TRANDATE) injection 10 mg  10 mg IntraVENous Q4H PRN Hitesh Rodriguez MD   10 mg at 08/27/22 0449    amLODIPine (NORVASC) tablet 10 mg  10 mg Oral Daily Hitesh Rodriguez MD        heparin (porcine) injection 4,000 Units  4,000 Units IntraVENous PRN Franco Hinkle DO        heparin (porcine) injection 2,000 Units  2,000 Units IntraVENous PRN Franco Hinkle DO        ondansetron (ZOFRAN-ODT) disintegrating tablet 4 mg  4 mg Oral Q8H PRN Hitesh Rodriguez MD        Or    ondansetron [facility]) injection 4 mg  4 mg IntraVENous Q6H PRN Hitesh Rodriguez MD        acetaminophen (TYLENOL) tablet 650 mg  650 mg Oral Q6H PRN Hitesh Rodriguez MD        Or    acetaminophen (TYLENOL) suppository 650 mg  650 mg Rectal Q6H PRN Brionna Mijares MD        atorvastatin (LIPITOR) tablet 80 mg  80 mg Oral Nightly Brionna Mijares MD   80 mg at 22 0044    nitroGLYCERIN (NITROSTAT) SL tablet 0.4 mg  0.4 mg SubLINGual Q5 Min PRN Brionna Mijares MD           Social History     Socioeconomic History    Marital status:      Spouse name: 2nd : Mr. 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  She had had her first cigarette at age 22 years, for 42 years she had averaged 1/4 PPD for 10.5 pack-years   Vaping Use    Vaping Use: Never used   Substance and Sexual Activity    Alcohol use: Not Currently     Comment: used to drink 2 beers every 3 weeks    Drug use: Never    Sexual activity: Not on file     Comment: has no kids   Other Topics Concern    Not on file   Social History Narrative    CODE STATUS: Full Code    HEALTH CARE PROXY: her , Mr. Chris Garcia, [phone]    AMBULATES: independently    DOMICILED: has stairs in the home, uses the stairs, lives with her  and her brother, has no home pets     Social Determinants of Health     Financial Resource Strain: Not on file   Food Insecurity: Not on file   Transportation Needs: Not on file   Physical Activity: Not on file   Stress: Not on file   Social Connections: Not on file   Intimate Partner Violence: Not on file   Housing Stability: Not on file       Family History   Problem Relation Age of Onset    Heart Failure Mother          at age 80 years, was a smoker    Atrial Fibrillation Mother     Heart Failure Father          at age 71, was a smoker    Diabetes Father     Heart Attack Sister          at age 48 years, smoked    Arthritis Sister         smoked    Diabetes type 2  Brother         smoked    Diabetes type 2  Brother         smoked    Obesity Brother     Diabetes Brother         did not smoke       Review of Systems:   Heart: as above   Lungs: as above   Eyes: denies changes in vision or discharge. Ears: denies changes in hearing or pain. Nose: denies epistaxis or masses   Throat: denies sore throat or trouble swallowing. Neuro: denies numbness, tingling, tremors. Skin: denies rashes or itching. : denies hematuria, dysuria   GI: denies vomiting, diarrhea   Psych: denies mood changed, anxiety, depression. all others negative. Physical Exam   BP (!) 172/104   Pulse 87   Temp 97.7 °F (36.5 °C) (Oral)   Resp 18   Ht 5' (1.524 m)   Wt 200 lb 9.9 oz (91 kg)   SpO2 100%   BMI 39.18 kg/m²   Constitutional: Oriented to person, place, and time. Well-developed and well-nourished. No distress. Head: Normocephalic and atraumatic. Eyes: EOM are normal. Pupils are equal, round, and reactive to light. Neck: Normal range of motion. Neck supple. Positive hepatojugular reflux, JVP elevated at 14 cm. Carotid bruit is not present. No tracheal deviation present. No thyromegaly present. Cardiovascular: Normal rate, regular rhythm, normal heart sounds and intact distal pulses. Exam reveals no gallop and no friction rub. No murmur heard. Pulmonary/Chest: Effort normal and breath sounds normal. No respiratory distress. No wheezes. No rales. No tenderness. Abdominal: Soft. Bowel sounds are normal. No distension and no mass. No tenderness. No rebound and no guarding. Musculoskeletal: Normal range of motion. No edema and no tenderness. Lymphadenopathy:   No cervical adenopathy. No groin adenopathy. Neurological: Alert and oriented to person, place, and time. Skin: Skin is warm and dry. No rash noted. Not diaphoretic. No erythema. Psychiatric: Normal mood and affect. Behavior is normal.     CBC:   Lab Results   Component Value Date/Time    WBC 5.8 08/26/2022 11:41 PM    RBC 5.15 08/26/2022 11:41 PM    HGB 14.3 08/26/2022 11:41 PM    HCT 44.2 08/26/2022 11:41 PM    MCV 85.8 08/26/2022 11:41 PM    MCH 27.8 08/26/2022 11:41 PM    MCHC 32.4 08/26/2022 11:41 PM    RDW 16.2 08/26/2022 11:41 PM     08/26/2022 11:41 PM    MPV 11.0 08/26/2022 11:41 PM     BMP:   Lab Results   Component Value Date/Time     08/26/2022 05:50 PM    K 4.0 08/26/2022 05:50 PM     08/26/2022 05:50 PM    CO2 17 08/26/2022 05:50 PM    BUN 16 08/26/2022 05:50 PM    LABALBU 3.6 08/26/2022 11:41 PM    CREATININE 1.2 08/26/2022 05:50 PM    CALCIUM 9.1 08/26/2022 05:50 PM    GFRAA 55 08/26/2022 05:50 PM    LABGLOM 55 08/26/2022 05:50 PM     Magnesium:    Lab Results   Component Value Date/Time    MG 2.0 08/26/2022 11:41 PM     Cardiac Enzymes:   Lab Results   Component Value Date    TROPHS 584 (H) 08/26/2022    TROPHS 486 (H) 08/26/2022    TROPHS 122 (H) 08/26/2022      PT/INR:  No results found for: PROTIME, INR  TSH:  No results found for: TSH        EKG: Done and independently reviewed by me. Shows normal sinus rhythm. Left atrial enlargement. Nonspecific ST changes. Poor R wave progression. Echocardiogram done on 8/27/2022: On preliminary review, LVEF is about 10 to 15%. Moderate RV dysfunction. LV apical thrombi seen. No significant valvular disease. ASSESSMENT AND PLAN:  1.  Non-STEMI: Troponin significantly elevated. Agree with heparin for anticoagulation. Agree with aspirin and full-strength Lipitor. Discussed proceeding with a heart catheterization and she is agreeable. Will place on schedule for Monday. Score 16, 7.  2.  New onset systolic dysfunction, likely ischemic cardiomyopathy, ACC/AHA stage C, NYHA class III: She appears hypervolemic on examination. Started on Lasix 20 IV twice daily. 1.2 L negative since yesterday. Admission BNP elevated at 14,311. We will start Entresto 24-26 twice daily. Continue Norvasc 10 mg daily as her pressures have been very elevated here. Will start beta-blocker, carvedilol 6.25 twice daily. If renal function is stable, will initiate Aldactone tomorrow. We will also initiate Jardiance 10 mg daily. Check BNP every other day. Will discuss LifeVest prior to discharge. 3.  LV apical thrombus: On heparin now. Will eventually need to be transitioned to Coumadin. Will hold after cardiac catheterization is done. Heart failure education as well as salt restriction counseling provided. I will also refer her to the congestive heart failure clinic for close clinical follow-up. 4.  Morbid obesity: Counseled about compliance with diet, exercise and weight loss. 5.  Hyperlipidemia: Continue Lipitor 80 mg daily. Vikas Villavicencio MD  [facility]) Cardiology     NOTE: This report was transcribed using voice recognition software. Every effort was made to ensure accuracy; however, inadvertent computerized transcription errors may be present.

## 2022-08-27 NOTE — PROGRESS NOTES
2 decho completed by Xin Goode [facility] Attempting to find out cardiologist to notify of critical findings  Dr Evonne Carbajal notified

## 2022-08-27 NOTE — H&P
[facility] History and Physical    Patient Information:  Patient: Joseph London  MRN: [id number]   Acct: [de-identified]  YOB: 1960  Admit Date: 8/26/2022      Primary Care Physician: No primary care provider on file. Advance Directive: Full Code  Health Care Proxy: her , Mr. Jennifer Ivey, [phone]        SUBJECTIVE:    Chief Complaint   Patient presents with    Shortness of Breath     PALE/ X 2 DAYS/ SLIGHT VOMITING     EP Sign Out:  NSTEMI  Page out to Cardiology, will update me when they call back  No cardiac cath lab here but anticipate pt can stay  Never had any Chest Pain, just Dyspnea    HPI:  Mrs. Joseph London is a pleasant 59year old  [place] lady from home. She states that she has had 2 days of dyspnea and that she had thought this was just indigestion. She states that she is sure it was just indigestion in the past. She states that she was on medication for hypertension and hypercholesterolemia in the past. She used to see a PCP but she states that they \"let her go\" as she was \"not going enough\" as she was busy taking care of her Mother and working full time. She states that was 6 year ago and that she has not been on medication since then. She states that when they let her go they never gave her any referrals. She is a former tobacco using lady having quit on June 16th of this year (2022). She had had her first cigarette at age 22 years, for 42 years she had averaged 1/4 PPD for 10.5 pack-years. She denies any use of crack or cocaine or methamphetamine or amphetamine or any stimulant containing weight loss drugs. She states that there is nothing else we need to know. She states she did not have any chest pain. Review of Systems:   Review of Systems   Constitutional:  Positive for diaphoresis and fever. Negative for fatigue and unexpected weight change. Respiratory:  Positive for shortness of breath.     Cardiovascular: Positive for leg swelling. Negative for chest pain and palpitations. Gastrointestinal:  Positive for nausea and vomiting. Neurological:  Negative for weakness and light-headedness. Psychiatric/Behavioral:  Negative for confusion. Past Medical History:   Diagnosis Date    Hyperlipidemia     Hypertension     Tobacco abuse, in remission      Past Psychiatric History:  Denies any    Past Surgical History:   Procedure Laterality Date    ANKLE SURGERY Right      &      Social History       Tobacco History       Smoking Status  Former Smoking Start Date   Quit Date   Smoking Frequency  0.25 packs/day for 42.00 years (10.50 pk-yrs)    Smoking Tobacco Type  Cigarettes from  to       Smokeless Tobacco Use  Never      Tobacco Comments  quit on  of this year ().  She had had her first cigarette at age 22 years, for 42 years she had averaged 1/4 PPD for 10.5 pack-years              Alcohol History       Alcohol Use Status  Not Currently Comment  used to drink 2 beers every 3 weeks              Drug Use       Drug Use Status  Never              Sexual Activity       Sexually Active  Not Asked Comment  has no kids             CODE STATUS: Full Code  HEALTH CARE PROXY: her , Mr. Mirna Monk, [phone]  AMBULATES: independently  DOMICILED: has stairs in the home, uses the stairs, lives with her  and her brother, has no home pets     Family History   Problem Relation Age of Onset    Heart Failure Mother          at age 80 years, was a smoker    Atrial Fibrillation Mother     Heart Failure Father          at age 71, was a smoker    Diabetes Father     Heart Attack Sister          at age 48 years, smoked    Arthritis Sister         smoked    Diabetes type 2  Brother         smoked    Diabetes type 2  Brother         smoked    Obesity Brother     Diabetes Brother         did not smoke     Allergies:   No Known Allergies    Home Medications:  Prior to Admission medications    Not on File   Has no home medications      OBJECTIVE:    Vitals:    08/26/22 2347   BP: (!) 157/139   Pulse: 95   Resp: 18   Temp:    SpO2: 96%   Breathing on NC    BP (!) 157/139   Pulse 95   Temp 98.6 °F (37 °C) (Oral)   Resp 18   Ht 5' (1.524 m)   Wt 165 lb (74.8 kg)   SpO2 96%   BMI 32.22 kg/m²     No intake or output data in the 24 hours ending 08/27/22 0018    Physical Exam  Vitals reviewed. Constitutional:       General: She is not in acute distress. Appearance: Normal appearance. She is obese. She is not ill-appearing or toxic-appearing. HENT:      Head: Normocephalic and atraumatic. Nose: No congestion or rhinorrhea. Eyes:      General:         Right eye: No discharge. Left eye: No discharge. Neck:      Comments: Supple, trachea appears midline  Cardiovascular:      Rate and Rhythm: Normal rate and regular rhythm. Heart sounds: No murmur heard. No friction rub. No gallop. Pulmonary:      Effort: Pulmonary effort is normal. No respiratory distress. Breath sounds: No stridor. No wheezing, rhonchi or rales. Chest:      Chest wall: No tenderness. Abdominal:      Tenderness: There is no abdominal tenderness. There is no guarding or rebound. Musculoskeletal:         General: No tenderness or signs of injury. Right lower leg: Edema present. Left lower leg: Edema present. Skin:     General: Skin is warm. Comments: nondiaphoretic   Neurological:      Mental Status: She is alert. Cranial Nerves: No dysarthria. Motor: No tremor or seizure activity.    Psychiatric:         Mood and Affect: Mood normal.         Behavior: Behavior normal.      LABORATORY DATA:    CBC:   Recent Labs     08/26/22  1750 08/26/22  2341   WBC 6.0 5.8   HGB 14.5 14.3   HCT 45.5 44.2    326     BMP:   Recent Labs     08/26/22  1750 08/26/22  2341     --    K 4.0  --      --    CO2 17*  --    BUN 16  --    CREATININE 1.2* --    CALCIUM 9.1  --    PHOS  --  4.5     Hepatic Profile: No results for input(s): AST, ALT, BILIDIR, BILITOT, ALKPHOS in the last 72 hours. Coag Panel:   Recent Labs     08/26/22  2341   APTT 28.2     Cardiac Enzymes: No results for input(s): Cherre Gash in the last 72 hours. Pro-BNP:   Recent Labs     08/26/22  1750   PROBNP 14,311*     A1C: No results for input(s): LABA1C in the last 72 hours. TSH: Invalid input(s): LABTSH  ABG: No results for input(s): PHART, XRH7MSP, PO2ART, UKA1PVD, BEART, HGBAE, D0DXQETV, CARBOXHGBART in the last 72 hours. Urinalysis: No results found for: [place], BACTERIA, RBCUA, BLOODU, SPECGRAV, GLUCOSEU    EKG:   No st changes as per EP report    IMAGING:  XR CHEST PORTABLE  Result Date: 8/26/2022  1. Enlarged cardiac silhouette related to cardiomegaly or pericardial effusion. 2. Pulmonary vascular congestion. CTA CHEST W CONTRAST  Result Date: 8/26/2022  No evidence of pulmonary embolism or acute pulmonary abnormality. There is mild pulmonary edema and a mild right basilar pleural effusion.          ASESSMENTS & PLANS:    NSTEMI:  Suspect New Onset CHF:  Hx HTN untreated x 6 years:  Hx HLD untreated x 6 years:  Hx Tobacco Abuse for 4 decades in remission x 2 months:  Class 1 Morbid Obesity:  Tele  Admit to cardiac STEP DOWN garcía as inpatient status patient  Cardio consulted already by EP  Trend TnI  Mag, Phos, TSH, Lipid Panel, HbA1c, Hepatic Function Panel - will run as add ons to ED labs if able  CBC and BMP with Reflex for following AM  ASA as per protocol (324-325mg chewed STAT unless on 81ASA daily in which case 162mg chewed STAT if not yet given, THEN from following AM 81mg Daily.)  Statin as per protocol (High intensity Statin, if already on high intensity Stain of Simvasttain 80 continue it, if Lipitor 40+ then Lipitor 80 (if less than 40 just double so long as >=40), if Rosuvastatin 20mg+ then Rosuvastatin 40mg PO QHS (if less than 20 just double so long as >=20mg)  NG SL PRN CP  TTE in AM  Heparin GGT  Uncertain as to baseline renal functions so will hold off on an ACEi/ARB for now  Lasix 20mg IVP BID, with holding paramaters  Strict Is and Os  Daily weights  Elevate HoB  Elevate legs    Chronic Medical Problems:  Continue current Regimen as indicated  NO HOME MEDICATIONS AS PER LIST IN EMR    Supportive and prophylactic Txx:  DVT PPx: covered by Heparin GGT listed above  GI (PUD) PPx: not indicated  PT: not indicated  ADULT DIET; Clear Liquid; No Caffeine  heparin (porcine), heparin (porcine), ondansetron **OR** ondansetron, acetaminophen **OR** acetaminophen, nitroGLYCERIN      Care time of >52 minutes  Pt seen/examined and admitted to inpatient status. Inpatient status is used for patients with an expected LOS extending past two midnights due to medical therapy and or critical care needs, otherwise patients are placed to OBServation status. Signed:  Electronically signed by Raudel Dias MD on 8/27/22 at 12:22 AM EDT.

## 2022-08-28 LAB
ANION GAP SERPL CALCULATED.3IONS-SCNC: 9 MMOL/L (ref 7–16)
APTT: 105.9 SEC (ref 24.5–35.1)
APTT: 71.8 SEC (ref 24.5–35.1)
APTT: 80.8 SEC (ref 24.5–35.1)
BASOPHILS ABSOLUTE: 0.03 E9/L (ref 0–0.2)
BASOPHILS RELATIVE PERCENT: 0.5 % (ref 0–2)
BUN BLDV-MCNC: 7 MG/DL (ref 6–23)
CALCIUM SERPL-MCNC: 8 MG/DL (ref 8.6–10.2)
CHLORIDE BLD-SCNC: 100 MMOL/L (ref 98–107)
CO2: 27 MMOL/L (ref 22–29)
CREAT SERPL-MCNC: 0.8 MG/DL (ref 0.5–1)
EKG ATRIAL RATE: 93 BPM
EKG P AXIS: 30 DEGREES
EKG P-R INTERVAL: 158 MS
EKG Q-T INTERVAL: 386 MS
EKG QRS DURATION: 82 MS
EKG QTC CALCULATION (BAZETT): 479 MS
EKG R AXIS: -36 DEGREES
EKG T AXIS: -91 DEGREES
EKG VENTRICULAR RATE: 93 BPM
EOSINOPHILS ABSOLUTE: 0.06 E9/L (ref 0.05–0.5)
EOSINOPHILS RELATIVE PERCENT: 1.1 % (ref 0–6)
GFR AFRICAN AMERICAN: >60
GFR NON-AFRICAN AMERICAN: >60 ML/MIN/1.73
GLUCOSE BLD-MCNC: 98 MG/DL (ref 74–99)
HCT VFR BLD CALC: 42.5 % (ref 34–48)
HEMOGLOBIN: 14 G/DL (ref 11.5–15.5)
IMMATURE GRANULOCYTES #: 0.02 E9/L
IMMATURE GRANULOCYTES %: 0.4 % (ref 0–5)
LYMPHOCYTES ABSOLUTE: 1.62 E9/L (ref 1.5–4)
LYMPHOCYTES RELATIVE PERCENT: 29.4 % (ref 20–42)
MCH RBC QN AUTO: 28 PG (ref 26–35)
MCHC RBC AUTO-ENTMCNC: 32.9 % (ref 32–34.5)
MCV RBC AUTO: 85 FL (ref 80–99.9)
MONOCYTES ABSOLUTE: 0.64 E9/L (ref 0.1–0.95)
MONOCYTES RELATIVE PERCENT: 11.6 % (ref 2–12)
NEUTROPHILS ABSOLUTE: 3.14 E9/L (ref 1.8–7.3)
NEUTROPHILS RELATIVE PERCENT: 57 % (ref 43–80)
PDW BLD-RTO: 15.8 FL (ref 11.5–15)
PLATELET # BLD: 316 E9/L (ref 130–450)
PMV BLD AUTO: 10.8 FL (ref 7–12)
POTASSIUM SERPL-SCNC: 3.4 MMOL/L (ref 3.5–5)
RBC # BLD: 5 E12/L (ref 3.5–5.5)
SODIUM BLD-SCNC: 136 MMOL/L (ref 132–146)
WBC # BLD: 5.5 E9/L (ref 4.5–11.5)

## 2022-08-28 PROCEDURE — 2580000003 HC RX 258: Performed by: HOSPITALIST

## 2022-08-28 PROCEDURE — 6360000002 HC RX W HCPCS: Performed by: INTERNAL MEDICINE

## 2022-08-28 PROCEDURE — 99233 SBSQ HOSP IP/OBS HIGH 50: CPT | Performed by: INTERNAL MEDICINE

## 2022-08-28 PROCEDURE — 6370000000 HC RX 637 (ALT 250 FOR IP): Performed by: HOSPITALIST

## 2022-08-28 PROCEDURE — 99232 SBSQ HOSP IP/OBS MODERATE 35: CPT | Performed by: INTERNAL MEDICINE

## 2022-08-28 PROCEDURE — 2060000000 HC ICU INTERMEDIATE R&B

## 2022-08-28 PROCEDURE — 85025 COMPLETE CBC W/AUTO DIFF WBC: CPT

## 2022-08-28 PROCEDURE — 80048 BASIC METABOLIC PNL TOTAL CA: CPT

## 2022-08-28 PROCEDURE — 6370000000 HC RX 637 (ALT 250 FOR IP): Performed by: INTERNAL MEDICINE

## 2022-08-28 PROCEDURE — 6360000002 HC RX W HCPCS: Performed by: EMERGENCY MEDICINE

## 2022-08-28 PROCEDURE — 93005 ELECTROCARDIOGRAM TRACING: CPT | Performed by: HOSPITALIST

## 2022-08-28 PROCEDURE — 36415 COLL VENOUS BLD VENIPUNCTURE: CPT

## 2022-08-28 PROCEDURE — 85730 THROMBOPLASTIN TIME PARTIAL: CPT

## 2022-08-28 RX ORDER — SPIRONOLACTONE 25 MG/1
12.5 TABLET ORAL DAILY
Status: DISCONTINUED | OUTPATIENT
Start: 2022-08-29 | End: 2022-08-29 | Stop reason: HOSPADM

## 2022-08-28 RX ADMIN — SACUBITRIL AND VALSARTAN 1 TABLET: 24; 26 TABLET, FILM COATED ORAL at 20:30

## 2022-08-28 RX ADMIN — ASPIRIN 81 MG CHEWABLE TABLET 81 MG: 81 TABLET CHEWABLE at 09:13

## 2022-08-28 RX ADMIN — CARVEDILOL 6.25 MG: 6.25 TABLET, FILM COATED ORAL at 18:19

## 2022-08-28 RX ADMIN — FUROSEMIDE 40 MG: 10 INJECTION, SOLUTION INTRAMUSCULAR; INTRAVENOUS at 09:13

## 2022-08-28 RX ADMIN — ATORVASTATIN CALCIUM 80 MG: 40 TABLET, FILM COATED ORAL at 20:30

## 2022-08-28 RX ADMIN — AMLODIPINE BESYLATE 10 MG: 5 TABLET ORAL at 09:13

## 2022-08-28 RX ADMIN — Medication 9 UNITS/KG/HR: at 10:00

## 2022-08-28 RX ADMIN — CARVEDILOL 6.25 MG: 6.25 TABLET, FILM COATED ORAL at 09:13

## 2022-08-28 RX ADMIN — Medication 10 ML: at 20:31

## 2022-08-28 RX ADMIN — SACUBITRIL AND VALSARTAN 1 TABLET: 24; 26 TABLET, FILM COATED ORAL at 09:13

## 2022-08-28 RX ADMIN — Medication 10 ML: at 09:30

## 2022-08-28 RX ADMIN — FUROSEMIDE 40 MG: 10 INJECTION, SOLUTION INTRAMUSCULAR; INTRAVENOUS at 18:19

## 2022-08-28 ASSESSMENT — PAIN SCALES - GENERAL: PAINLEVEL_OUTOF10: 0

## 2022-08-28 NOTE — PROGRESS NOTES
Patient pleasant and cooperative this am, denies pain or discomfort at this time. Patient continues on heparin therapy and tolerating well. Currently on room air and spo2 maintaining above 95%. Assessment complete. Will continue to monitor.

## 2022-08-28 NOTE — PROGRESS NOTES
INPATIENT CARDIOLOGY FOLLOW-UP    Name: Yanelis Enriquez    Age: 59 y.o. Date of Admission: 8/26/2022  5:13 PM    Date of Service: 8/28/2022    Primary Cardiologist: New to me from this admission    Chief Complaint: Follow-up for new onset decompensated systolic heart failure, non-STEMI    Interim History:  No new overnight cardiac complaints. Currently with no complaints of CP, SOB, palpitations, dizziness, or lightheadedness. SR on telemetry. 6 L negative overall this admission. Feels a lot better.     Review of Systems:   Negative except as described above    Problem List:  Patient Active Problem List   Diagnosis    NSTEMI (non-ST elevated myocardial infarction) ([facility] [address]       Current Medications:    Current Facility-Administered Medications:     sodium chloride flush 0.9 % injection 5-40 mL, 5-40 mL, IntraVENous, 2 times per day, Terri Almendarez MD, 10 mL at 08/28/22 0930    sodium chloride flush 0.9 % injection 5-40 mL, 5-40 mL, IntraVENous, PRN, Terri Almendarez MD    0.9 % sodium chloride infusion, , IntraVENous, PRN, Terri Almendarez MD    aspirin chewable tablet 81 mg, 81 mg, Oral, Daily, Terri Almendarez MD, 81 mg at 08/28/22 0913    magnesium sulfate 2000 mg in 50 mL IVPB premix, 2,000 mg, IntraVENous, PRN, Terri Almendarez MD    potassium chloride (KLOR-CON M) extended release tablet 40 mEq, 40 mEq, Oral, PRN **OR** potassium bicarb-citric acid (EFFER-K) effervescent tablet 40 mEq, 40 mEq, Oral, PRN **OR** potassium chloride 10 mEq/100 mL IVPB (Peripheral Line), 10 mEq, IntraVENous, PRN, Terri Almendarez MD    polyethylene glycol (GLYCOLAX) packet 17 g, 17 g, Oral, Daily PRN, Terri Almendarez MD    melatonin disintegrating tablet 5 mg, 5 mg, Oral, Nightly PRN, Terri Almendarez MD    calcium carbonate (TUMS) chewable tablet 500 mg, 500 mg, Oral, TID PRN, Terri Almendarez MD    heparin 25,000 units in dextrose 5% 250 mL (premix) infusion, 5-30 Units/kg/hr, IntraVENous, Continuous, Homer Nava DO, Last Rate: 8.2 mL/hr at 08/28/22 1000, 9 Units/kg/hr at 08/28/22 1000    amLODIPine (NORVASC) tablet 10 mg, 10 mg, Oral, Daily, Primitivo Barron MD, 10 mg at 08/28/22 0913    furosemide (LASIX) injection 40 mg, 40 mg, IntraVENous, BID, Leighann Varela MD, 40 mg at 08/28/22 0913    sacubitril-valsartan (ENTRESTO) 24-26 MG per tablet 1 tablet, 1 tablet, Oral, BID, Phoenix Grigsby MD, 1 tablet at 08/28/22 0913    carvedilol (COREG) tablet 6.25 mg, 6.25 mg, Oral, BID WC, Phoenix Grigsby MD, 6.25 mg at 08/28/22 0913    heparin (porcine) injection 4,000 Units, 4,000 Units, IntraVENous, PRN, Raul Castillo, DO    heparin (porcine) injection 2,000 Units, 2,000 Units, IntraVENous, PRN, Raul Castillo, DO    ondansetron (ZOFRAN-ODT) disintegrating tablet 4 mg, 4 mg, Oral, Q8H PRN **OR** ondansetron (ZOFRAN) injection 4 mg, 4 mg, IntraVENous, Q6H PRN, Primitivo Barron MD    acetaminophen (TYLENOL) tablet 650 mg, 650 mg, Oral, Q6H PRN **OR** acetaminophen (TYLENOL) suppository 650 mg, 650 mg, Rectal, Q6H PRN, Primitivo Barron MD    atorvastatin (LIPITOR) tablet 80 mg, 80 mg, Oral, Nightly, Primitivo Barron MD, 80 mg at 08/27/22 2201    nitroGLYCERIN (NITROSTAT) SL tablet 0.4 mg, 0.4 mg, SubLINGual, Q5 Min PRN, Primitivo Barron MD    Physical Exam:  /67   Pulse 82   Temp 98 °F (36.7 °C) (Oral)   Resp 18   Ht 5' (1.524 m)   Wt 188 lb (85.3 kg)   SpO2 95%   BMI 36.72 kg/m²   Wt Readings from Last 3 Encounters:   08/28/22 188 lb (85.3 kg)     Appearance: Awake, alert, no acute respiratory distress  Skin: Intact, no rash  Head: Normocephalic, atraumatic  Eyes: EOMI, no conjunctival erythema  ENMT: No pharyngeal erythema, MMM, no rhinorrhea  Neck: Supple, JVP elevated at 13 cm, no carotid bruits  Lungs: Clear to auscultation bilaterally. No wheezes, rales, or rhonchi.   Cardiac: PMI nondisplaced, Regular rhythm with a normal rate, S1 & S2 normal, no murmurs  Abdomen: Soft, nontender, +bowel sounds  Extremities: Moves all extremities x 4, no dysfunction. Elevated LA pressure. Normal right ventricle structure and moderate function. The left atrium is moderately dilated. Normal right atrial size   Mild-moderate mitral regurgitation is present. Moderate tricuspid regurgitation. Moderate-severe pulmonary hypertension with a PASP of 64 mm Hg. Elevated RA pressure. There is a fixed echodensity measuring 2 x 1.8 cms at the LV apex. This is   consistent with an LV thrombus. There is another fixed, smaller   echodensity, also at the apex consistent with a thrombus. No comparison study available. ASSESSMENT AND PLAN:  1.  Non-STEMI: Troponin significantly elevated. Agree with heparin for anticoagulation. Agree with aspirin and full-strength Lipitor. Discussed proceeding with a heart catheterization and she is agreeable. Will place on schedule for Monday. Score 16, 7.  2.  New onset systolic dysfunction, likely ischemic cardiomyopathy, ACC/AHA stage C, NYHA class III: She appears hypervolemic on examination. Started on Lasix 20 IV twice daily. 6 L negative this admission so far. Admission BNP elevated at 14,311. Labs pending from today. I have ordered them. Check BNP tomorrow. We will start Entresto 24-26 twice daily. Her pressures were initially elevated. Doing better today. Will start beta-blocker, carvedilol 6.25 twice daily. I will discontinue the Norvasc and initiate Aldactone 12.5 mg daily to be started from tomorrow. We will also initiate Jardiance 10 mg daily. She will need a LifeVest prior to discharge  3. LV apical thrombus: On heparin now. Will eventually need to be transitioned to Coumadin. Will hold transition till after cardiac catheterization is done. Heart failure education as well as salt restriction counseling provided. I will also refer her to the congestive heart failure clinic for close clinical follow-up. 4.  Morbid obesity: Counseled about compliance with diet, exercise and weight loss.   5. Hyperlipidemia: Continue Lipitor 80 mg daily. Jerie Halsted MD, [facility]    NOTE: This report was transcribed using voice recognition software. Every effort was made to ensure accuracy; however, inadvertent computerized transcription errors may be present.

## 2022-08-29 ENCOUNTER — HOSPITAL ENCOUNTER (OUTPATIENT)
Age: 62
Setting detail: OBSERVATION
Discharge: HOME | End: 2022-08-30
Attending: INTERNAL MEDICINE | Admitting: INTERNAL MEDICINE

## 2022-08-29 VITALS
WEIGHT: 185 LBS | RESPIRATION RATE: 18 BRPM | OXYGEN SATURATION: 97 % | HEART RATE: 64 BPM | DIASTOLIC BLOOD PRESSURE: 69 MMHG | SYSTOLIC BLOOD PRESSURE: 108 MMHG | HEIGHT: 60 IN | BODY MASS INDEX: 36.32 KG/M2 | TEMPERATURE: 98.2 F

## 2022-08-29 PROBLEM — I42.8 NONISCHEMIC CARDIOMYOPATHY (HCC): Status: ACTIVE | Noted: 2022-08-29

## 2022-08-29 PROBLEM — I25.10 CAD IN NATIVE ARTERY: Status: ACTIVE | Noted: 2022-08-29

## 2022-08-29 LAB
ABO/RH: NORMAL
ANION GAP SERPL CALCULATED.3IONS-SCNC: 12 MMOL/L (ref 7–16)
ANTIBODY SCREEN: NORMAL
APTT: 75.9 SEC (ref 24.5–35.1)
BASOPHILS ABSOLUTE: 0.02 E9/L (ref 0–0.2)
BASOPHILS RELATIVE PERCENT: 0.4 % (ref 0–2)
BUN BLDV-MCNC: 6 MG/DL (ref 6–23)
CALCIUM SERPL-MCNC: 8.1 MG/DL (ref 8.6–10.2)
CHLORIDE BLD-SCNC: 98 MMOL/L (ref 98–107)
CO2: 28 MMOL/L (ref 22–29)
CREAT SERPL-MCNC: 0.9 MG/DL (ref 0.5–1)
EOSINOPHILS ABSOLUTE: 0.09 E9/L (ref 0.05–0.5)
EOSINOPHILS RELATIVE PERCENT: 1.8 % (ref 0–6)
GFR AFRICAN AMERICAN: >60
GFR NON-AFRICAN AMERICAN: >60 ML/MIN/1.73
GLUCOSE BLD-MCNC: 90 MG/DL (ref 74–99)
HCT VFR BLD CALC: 42.8 % (ref 34–48)
HEMOGLOBIN: 14.1 G/DL (ref 11.5–15.5)
IMMATURE GRANULOCYTES #: 0.01 E9/L
IMMATURE GRANULOCYTES %: 0.2 % (ref 0–5)
LYMPHOCYTES ABSOLUTE: 1.67 E9/L (ref 1.5–4)
LYMPHOCYTES RELATIVE PERCENT: 34 % (ref 20–42)
MAGNESIUM: 1.4 MG/DL (ref 1.6–2.6)
MCH RBC QN AUTO: 27.8 PG (ref 26–35)
MCHC RBC AUTO-ENTMCNC: 32.9 % (ref 32–34.5)
MCV RBC AUTO: 84.3 FL (ref 80–99.9)
MONOCYTES ABSOLUTE: 0.73 E9/L (ref 0.1–0.95)
MONOCYTES RELATIVE PERCENT: 14.9 % (ref 2–12)
NEUTROPHILS ABSOLUTE: 2.39 E9/L (ref 1.8–7.3)
NEUTROPHILS RELATIVE PERCENT: 48.7 % (ref 43–80)
PDW BLD-RTO: 15.4 FL (ref 11.5–15)
PLATELET # BLD: 295 E9/L (ref 130–450)
PMV BLD AUTO: 11.6 FL (ref 7–12)
POTASSIUM REFLEX MAGNESIUM: 3.1 MMOL/L (ref 3.5–5)
PRO-BNP: 2181 PG/ML (ref 0–125)
RBC # BLD: 5.08 E12/L (ref 3.5–5.5)
SODIUM BLD-SCNC: 138 MMOL/L (ref 132–146)
WBC # BLD: 4.9 E9/L (ref 4.5–11.5)

## 2022-08-29 PROCEDURE — C1769 GUIDE WIRE: HCPCS

## 2022-08-29 PROCEDURE — 36415 COLL VENOUS BLD VENIPUNCTURE: CPT

## 2022-08-29 PROCEDURE — 85025 COMPLETE CBC W/AUTO DIFF WBC: CPT

## 2022-08-29 PROCEDURE — 6370000000 HC RX 637 (ALT 250 FOR IP): Performed by: INTERNAL MEDICINE

## 2022-08-29 PROCEDURE — 93454 CORONARY ARTERY ANGIO S&I: CPT

## 2022-08-29 PROCEDURE — 86900 BLOOD TYPING SEROLOGIC ABO: CPT

## 2022-08-29 PROCEDURE — 6360000002 HC RX W HCPCS

## 2022-08-29 PROCEDURE — 80048 BASIC METABOLIC PNL TOTAL CA: CPT

## 2022-08-29 PROCEDURE — C1894 INTRO/SHEATH, NON-LASER: HCPCS

## 2022-08-29 PROCEDURE — 2500000003 HC RX 250 WO HCPCS

## 2022-08-29 PROCEDURE — G0379 DIRECT REFER HOSPITAL OBSERV: HCPCS

## 2022-08-29 PROCEDURE — 6360000002 HC RX W HCPCS: Performed by: INTERNAL MEDICINE

## 2022-08-29 PROCEDURE — 86901 BLOOD TYPING SEROLOGIC RH(D): CPT

## 2022-08-29 PROCEDURE — 2580000003 HC RX 258: Performed by: INTERNAL MEDICINE

## 2022-08-29 PROCEDURE — 6360000002 HC RX W HCPCS: Performed by: EMERGENCY MEDICINE

## 2022-08-29 PROCEDURE — 93454 CORONARY ARTERY ANGIO S&I: CPT | Performed by: INTERNAL MEDICINE

## 2022-08-29 PROCEDURE — G0378 HOSPITAL OBSERVATION PER HR: HCPCS

## 2022-08-29 PROCEDURE — 83880 ASSAY OF NATRIURETIC PEPTIDE: CPT

## 2022-08-29 PROCEDURE — 85730 THROMBOPLASTIN TIME PARTIAL: CPT

## 2022-08-29 PROCEDURE — 83735 ASSAY OF MAGNESIUM: CPT

## 2022-08-29 PROCEDURE — 86850 RBC ANTIBODY SCREEN: CPT

## 2022-08-29 PROCEDURE — 2709999900 HC NON-CHARGEABLE SUPPLY

## 2022-08-29 RX ORDER — CARVEDILOL 6.25 MG/1
12.5 TABLET ORAL 2 TIMES DAILY WITH MEALS
Status: DISCONTINUED | OUTPATIENT
Start: 2022-08-29 | End: 2022-08-29

## 2022-08-29 RX ORDER — SPIRONOLACTONE 25 MG/1
25 TABLET ORAL DAILY
Status: DISCONTINUED | OUTPATIENT
Start: 2022-08-29 | End: 2022-08-30 | Stop reason: HOSPADM

## 2022-08-29 RX ORDER — FUROSEMIDE 40 MG/1
40 TABLET ORAL DAILY
Status: DISCONTINUED | OUTPATIENT
Start: 2022-08-29 | End: 2022-08-30 | Stop reason: HOSPADM

## 2022-08-29 RX ORDER — AMIODARONE HYDROCHLORIDE 200 MG/1
200 TABLET ORAL DAILY
Status: DISCONTINUED | OUTPATIENT
Start: 2022-09-03 | End: 2022-08-30 | Stop reason: HOSPADM

## 2022-08-29 RX ORDER — ASPIRIN 325 MG
325 TABLET ORAL ONCE
Status: COMPLETED | OUTPATIENT
Start: 2022-08-29 | End: 2022-08-29

## 2022-08-29 RX ORDER — SODIUM CHLORIDE 0.9 % (FLUSH) 0.9 %
5-40 SYRINGE (ML) INJECTION EVERY 12 HOURS SCHEDULED
Status: DISCONTINUED | OUTPATIENT
Start: 2022-08-29 | End: 2022-08-30 | Stop reason: HOSPADM

## 2022-08-29 RX ORDER — AMLODIPINE BESYLATE 5 MG/1
5 TABLET ORAL DAILY
Status: DISCONTINUED | OUTPATIENT
Start: 2022-08-29 | End: 2022-08-29

## 2022-08-29 RX ORDER — METOPROLOL SUCCINATE 25 MG/1
25 TABLET, EXTENDED RELEASE ORAL DAILY
Status: DISCONTINUED | OUTPATIENT
Start: 2022-08-29 | End: 2022-08-30 | Stop reason: HOSPADM

## 2022-08-29 RX ORDER — SODIUM CHLORIDE 9 MG/ML
INJECTION, SOLUTION INTRAVENOUS PRN
Status: DISCONTINUED | OUTPATIENT
Start: 2022-08-29 | End: 2022-08-30 | Stop reason: HOSPADM

## 2022-08-29 RX ORDER — ACETAMINOPHEN 325 MG/1
650 TABLET ORAL EVERY 4 HOURS PRN
Status: DISCONTINUED | OUTPATIENT
Start: 2022-08-29 | End: 2022-08-30 | Stop reason: HOSPADM

## 2022-08-29 RX ORDER — MAGNESIUM SULFATE IN WATER 40 MG/ML
2000 INJECTION, SOLUTION INTRAVENOUS ONCE
Status: COMPLETED | OUTPATIENT
Start: 2022-08-29 | End: 2022-08-29

## 2022-08-29 RX ORDER — SODIUM CHLORIDE 0.9 % (FLUSH) 0.9 %
5-40 SYRINGE (ML) INJECTION PRN
Status: DISCONTINUED | OUTPATIENT
Start: 2022-08-29 | End: 2022-08-30 | Stop reason: HOSPADM

## 2022-08-29 RX ORDER — AMIODARONE HYDROCHLORIDE 200 MG/1
400 TABLET ORAL 2 TIMES DAILY
Status: DISCONTINUED | OUTPATIENT
Start: 2022-08-29 | End: 2022-08-30 | Stop reason: HOSPADM

## 2022-08-29 RX ADMIN — SODIUM CHLORIDE, PRESERVATIVE FREE 10 ML: 5 INJECTION INTRAVENOUS at 20:47

## 2022-08-29 RX ADMIN — AMIODARONE HYDROCHLORIDE 400 MG: 200 TABLET ORAL at 20:47

## 2022-08-29 RX ADMIN — APIXABAN 5 MG: 5 TABLET, FILM COATED ORAL at 20:47

## 2022-08-29 RX ADMIN — POTASSIUM BICARBONATE 40 MEQ: 782 TABLET, EFFERVESCENT ORAL at 15:40

## 2022-08-29 RX ADMIN — APIXABAN 5 MG: 5 TABLET, FILM COATED ORAL at 15:40

## 2022-08-29 RX ADMIN — METOPROLOL SUCCINATE 25 MG: 25 TABLET, EXTENDED RELEASE ORAL at 15:39

## 2022-08-29 RX ADMIN — MAGNESIUM SULFATE HEPTAHYDRATE 2000 MG: 40 INJECTION, SOLUTION INTRAVENOUS at 15:42

## 2022-08-29 RX ADMIN — Medication 9 UNITS/KG/HR: at 06:33

## 2022-08-29 RX ADMIN — ASPIRIN 325 MG: 325 TABLET ORAL at 08:43

## 2022-08-29 RX ADMIN — SPIRONOLACTONE 25 MG: 25 TABLET ORAL at 15:40

## 2022-08-29 RX ADMIN — AMIODARONE HYDROCHLORIDE 400 MG: 200 TABLET ORAL at 15:39

## 2022-08-29 RX ADMIN — FUROSEMIDE 40 MG: 40 TABLET ORAL at 15:39

## 2022-08-29 ASSESSMENT — LIFESTYLE VARIABLES
HOW OFTEN DO YOU HAVE A DRINK CONTAINING ALCOHOL: NEVER
HOW MANY STANDARD DRINKS CONTAINING ALCOHOL DO YOU HAVE ON A TYPICAL DAY: PATIENT DOES NOT DRINK

## 2022-08-29 ASSESSMENT — PAIN SCALES - GENERAL: PAINLEVEL_OUTOF10: 0

## 2022-08-29 NOTE — PLAN OF CARE
Problem: Discharge Planning  Goal: Discharge to home or other facility with appropriate resources  Outcome: Progressing  Flowsheets (Taken 8/29/2022 1355)  Discharge to home or other facility with appropriate resources: Identify discharge learning needs (meds, wound care, etc)     Problem: Safety - Adult  Goal: Free from fall injury  Outcome: Progressing     Problem: ABCDS Injury Assessment  Goal: Absence of physical injury  Outcome: Progressing

## 2022-08-29 NOTE — CARE COORDINATION
8/29/22 1000 CM note: NO COVID TESTING. 1L nc, heparin gtt, IV lasix. NEW ENTRESTO. CM did not see this patient as she discharged to [facility] for heart cath prior to IDR this am. Per chart review pts EF was 10% and cardiology notes state pt will need LIFE VEST PRIOR TO HER DISCHARGE. Pt has new consult for cardiac rehab this admit.  Electronically signed by Fransisco Mc RN on 8/29/2022 at 10:03 AM

## 2022-08-29 NOTE — PLAN OF CARE
Problem: Discharge Planning  Goal: Discharge to home or other facility with appropriate resources  Outcome: Progressing  Flowsheets (Taken 8/29/2022 0215)  Discharge to home or other facility with appropriate resources: Identify barriers to discharge with patient and caregiver     Problem: Safety - Adult  Goal: Free from fall injury  Outcome: Progressing

## 2022-08-29 NOTE — DISCHARGE SUMMARY
*:10 am.  I chart reviewed and saw plan from yesterday was to send to  e for cath for nstemi. She left already before I could see her. Please see Dr Sebastian cruz's notes from 8/29.   If further d/c summary needed please ask dr Rosa Alcala

## 2022-08-29 NOTE — PROCEDURES
[address]                  [address] [facility]  [facility]                            CARDIAC CATHETERIZATION    PATIENT NAME: Ninoska Shelley               :        1960  MED REC NO:   [id number]                            ROOM:  ACCOUNT NO:   [de-identified]                           ADMIT DATE: 2022  PROVIDER:     Dola Siemens, MD    DATE OF PROCEDURE:  2022    PROCEDURES:  1. Coronary angiography. 2.  Conscious sedation using Versed and fentanyl. Indication #4 with score of 8. INDICATION FOR PROCEDURE:  The patient is a 78-year-old female who was  admitted to the hospital with shortness of breath. The patient was  found to have cardiomyopathy and was diagnosed with non-ST elevation MI. Today, the patient was brought to the cath lab for further evaluation. DESCRIPTION OF PROCEDURE:  After the appropriate informed consent, the  right wrist area was prepped and draped in the usual sterile fashion. A  timeout was called. The right wrist area was locally anesthetized with  2 mL of 2% lidocaine. A 21-gauge needle was used to access the right  radial artery. A 6-Guinean introducer sheath was used to cannulate the  right radial artery. A 6-Guinean JL3.5 and 6-Guinean JR4 catheter were  used for coronary angiography in multiple projections. ANGIOGRAPHIC FINDINGS:  The left main coronary artery arises normally  from the left sinus of Valsalva. It is relatively short vessel,  mid-sized vessel without any disease. It trifurcates into left anterior  descending artery and left circumflex artery. The left anterior descending artery extends down to the apex and mildly  tortuous. It gives off good size diagonal branch that has no CAD. The ramus intermedius artery is a mid size vessel, tortuous without any  disease.   The left circumflex artery is large vessel, gives off two  small OM branches and very large left posterolateral branch. The left  circumflex artery and its branches have no CAD. The right coronary artery arises normally from the right sinus of  Valsalva. It is tortuous vessel, dominant circulation with 30% disease  in the mid segment. After completing the coronary angiography, at the end of the procedure,  the catheter and the wire were pulled out from the body, the sheath was  pulled out from the body, and a Vasc band was applied to the right wrist  area with effective hemostasis. COMPLICATIONS:  None. ESTIMATED TOTAL BLOOD LOSS:  10-15 mL. MEDICATIONS USED DURING THE PROCEDURE:  We used intraarterial verapamil  to prevent vasospasm. We used intraarterial heparin for  anticoagulation. CONSCIOUS SEDATION:  We used Versed and fentanyl for conscious sedation. First dose was given at 09:36, procedure ended at 09:53. There was 17  minutes of direct face-to-face supervision during conscious sedation  administration. TOTAL CONTRAST USED:  65 mL of Isovue. TOTAL FLUOROSCOPY TIME:  3.4 minutes. IMPRESSION:  1. Mild disease involving mid RCA. Otherwise, angiographically normal  coronary arteries. 2.  Nonischemic cardiomyopathy. RECOMMENDATIONS:  1. We will optimize cardiomyopathy treatment. 2.  The patient will be admitted to the hospital for overnight  observation.         Cielo Vo MD    D: 08/29/2022 10:13:28       T: 08/29/2022 11:04:38     [email or web address]  Job#: [id number]     Doc#: [id number]    CC:  Dr. Lazara Araujo

## 2022-08-30 VITALS
RESPIRATION RATE: 18 BRPM | TEMPERATURE: 96.7 F | BODY MASS INDEX: 36.05 KG/M2 | DIASTOLIC BLOOD PRESSURE: 95 MMHG | SYSTOLIC BLOOD PRESSURE: 98 MMHG | HEART RATE: 70 BPM | WEIGHT: 183.6 LBS | HEIGHT: 60 IN | OXYGEN SATURATION: 93 %

## 2022-08-30 LAB
ADENOVIRUS BY PCR: NOT DETECTED
ANION GAP SERPL CALCULATED.3IONS-SCNC: 8 MMOL/L (ref 7–16)
BORDETELLA PARAPERTUSSIS BY PCR: NOT DETECTED
BORDETELLA PERTUSSIS BY PCR: NOT DETECTED
BUN BLDV-MCNC: 10 MG/DL (ref 6–23)
CALCIUM SERPL-MCNC: 8.3 MG/DL (ref 8.6–10.2)
CHLAMYDOPHILIA PNEUMONIAE BY PCR: NOT DETECTED
CHLORIDE BLD-SCNC: 100 MMOL/L (ref 98–107)
CO2: 28 MMOL/L (ref 22–29)
CORONAVIRUS 229E BY PCR: NOT DETECTED
CORONAVIRUS HKU1 BY PCR: NOT DETECTED
CORONAVIRUS NL63 BY PCR: NOT DETECTED
CORONAVIRUS OC43 BY PCR: NOT DETECTED
CREAT SERPL-MCNC: 0.9 MG/DL (ref 0.5–1)
GFR AFRICAN AMERICAN: >60
GFR NON-AFRICAN AMERICAN: >60 ML/MIN/1.73
GLUCOSE BLD-MCNC: 112 MG/DL (ref 74–99)
HCT VFR BLD CALC: 42 % (ref 34–48)
HEMOGLOBIN: 14.1 G/DL (ref 11.5–15.5)
HUMAN METAPNEUMOVIRUS BY PCR: NOT DETECTED
HUMAN RHINOVIRUS/ENTEROVIRUS BY PCR: NOT DETECTED
INFLUENZA A BY PCR: NOT DETECTED
INFLUENZA B BY PCR: NOT DETECTED
MAGNESIUM: 1.9 MG/DL (ref 1.6–2.6)
MCH RBC QN AUTO: 28.2 PG (ref 26–35)
MCHC RBC AUTO-ENTMCNC: 33.6 % (ref 32–34.5)
MCV RBC AUTO: 84 FL (ref 80–99.9)
MYCOPLASMA PNEUMONIAE BY PCR: NOT DETECTED
PARAINFLUENZA VIRUS 1 BY PCR: NOT DETECTED
PARAINFLUENZA VIRUS 2 BY PCR: NOT DETECTED
PARAINFLUENZA VIRUS 3 BY PCR: NOT DETECTED
PARAINFLUENZA VIRUS 4 BY PCR: NOT DETECTED
PDW BLD-RTO: 16 FL (ref 11.5–15)
PLATELET # BLD: 303 E9/L (ref 130–450)
PMV BLD AUTO: 11.4 FL (ref 7–12)
POTASSIUM SERPL-SCNC: 3.8 MMOL/L (ref 3.5–5)
RBC # BLD: 5 E12/L (ref 3.5–5.5)
RESPIRATORY SYNCYTIAL VIRUS BY PCR: NOT DETECTED
SARS-COV-2, PCR: NOT DETECTED
SODIUM BLD-SCNC: 136 MMOL/L (ref 132–146)
T4 FREE: 0.97 NG/DL (ref 0.93–1.7)
TSH SERPL DL<=0.05 MIU/L-ACNC: 8.56 UIU/ML (ref 0.27–4.2)
WBC # BLD: 5.7 E9/L (ref 4.5–11.5)

## 2022-08-30 PROCEDURE — 85027 COMPLETE CBC AUTOMATED: CPT

## 2022-08-30 PROCEDURE — 2580000003 HC RX 258: Performed by: INTERNAL MEDICINE

## 2022-08-30 PROCEDURE — 99217 PR OBSERVATION CARE DISCHARGE MANAGEMENT: CPT | Performed by: INTERNAL MEDICINE

## 2022-08-30 PROCEDURE — 80048 BASIC METABOLIC PNL TOTAL CA: CPT

## 2022-08-30 PROCEDURE — G0378 HOSPITAL OBSERVATION PER HR: HCPCS

## 2022-08-30 PROCEDURE — 96366 THER/PROPH/DIAG IV INF ADDON: CPT

## 2022-08-30 PROCEDURE — 83735 ASSAY OF MAGNESIUM: CPT

## 2022-08-30 PROCEDURE — 84443 ASSAY THYROID STIM HORMONE: CPT

## 2022-08-30 PROCEDURE — 84439 ASSAY OF FREE THYROXINE: CPT

## 2022-08-30 PROCEDURE — 36415 COLL VENOUS BLD VENIPUNCTURE: CPT

## 2022-08-30 PROCEDURE — 0202U NFCT DS 22 TRGT SARS-COV-2: CPT

## 2022-08-30 PROCEDURE — 6370000000 HC RX 637 (ALT 250 FOR IP): Performed by: INTERNAL MEDICINE

## 2022-08-30 PROCEDURE — 96365 THER/PROPH/DIAG IV INF INIT: CPT

## 2022-08-30 PROCEDURE — 6360000002 HC RX W HCPCS: Performed by: NURSE PRACTITIONER

## 2022-08-30 RX ORDER — LISINOPRIL 2.5 MG/1
2.5 TABLET ORAL DAILY
Status: DISCONTINUED | OUTPATIENT
Start: 2022-08-30 | End: 2022-08-30

## 2022-08-30 RX ORDER — SPIRONOLACTONE 25 MG/1
25 TABLET ORAL DAILY
Qty: 90 TABLET | Refills: 3 | Status: SHIPPED | OUTPATIENT
Start: 2022-08-31

## 2022-08-30 RX ORDER — MAGNESIUM SULFATE IN WATER 40 MG/ML
2000 INJECTION, SOLUTION INTRAVENOUS ONCE
Status: COMPLETED | OUTPATIENT
Start: 2022-08-30 | End: 2022-08-30

## 2022-08-30 RX ORDER — GUAIFENESIN/DEXTROMETHORPHAN 100-10MG/5
10 SYRUP ORAL EVERY 4 HOURS PRN
Status: DISCONTINUED | OUTPATIENT
Start: 2022-08-30 | End: 2022-08-30 | Stop reason: HOSPADM

## 2022-08-30 RX ORDER — FUROSEMIDE 20 MG/1
20 TABLET ORAL DAILY
Qty: 90 TABLET | Refills: 2 | Status: SHIPPED | OUTPATIENT
Start: 2022-08-31

## 2022-08-30 RX ORDER — AMIODARONE HYDROCHLORIDE 200 MG/1
200 TABLET ORAL 2 TIMES DAILY
Qty: 60 TABLET | Refills: 0 | Status: SHIPPED | OUTPATIENT
Start: 2022-08-30 | End: 2022-10-06

## 2022-08-30 RX ORDER — METOPROLOL SUCCINATE 25 MG/1
25 TABLET, EXTENDED RELEASE ORAL DAILY
Qty: 90 TABLET | Refills: 3 | Status: SHIPPED | OUTPATIENT
Start: 2022-08-31

## 2022-08-30 RX ADMIN — FUROSEMIDE 40 MG: 40 TABLET ORAL at 08:26

## 2022-08-30 RX ADMIN — SPIRONOLACTONE 25 MG: 25 TABLET ORAL at 08:27

## 2022-08-30 RX ADMIN — AMIODARONE HYDROCHLORIDE 400 MG: 200 TABLET ORAL at 08:27

## 2022-08-30 RX ADMIN — MAGNESIUM SULFATE HEPTAHYDRATE 2000 MG: 40 INJECTION, SOLUTION INTRAVENOUS at 12:07

## 2022-08-30 RX ADMIN — SODIUM CHLORIDE, PRESERVATIVE FREE 10 ML: 5 INJECTION INTRAVENOUS at 08:27

## 2022-08-30 RX ADMIN — LISINOPRIL 2.5 MG: 2.5 TABLET ORAL at 11:24

## 2022-08-30 RX ADMIN — METOPROLOL SUCCINATE 25 MG: 25 TABLET, EXTENDED RELEASE ORAL at 08:27

## 2022-08-30 RX ADMIN — APIXABAN 5 MG: 5 TABLET, FILM COATED ORAL at 08:27

## 2022-08-30 RX ADMIN — POTASSIUM BICARBONATE 40 MEQ: 782 TABLET, EFFERVESCENT ORAL at 08:26

## 2022-08-30 NOTE — CARE COORDINATION
This CM notified Major Clines with [facility] via VM p) [phone] r/t wearable cardiac defibrillator; this CM provided bedside RN May Ready with [facility]; [facility]  as pt has no insurance listed. This CM met with pt at bedside to discuss discharge / transition of care plan. Pt reports from home with spouse; independent of all ADL; active ; denies DME or needs; verified pharmacy; pt has no PCP, will notify charge RN Aleja Downey to set-up at discharge; discharge plan is to return home with spouse or sister to provide transport once medically stable.

## 2022-08-30 NOTE — PLAN OF CARE
Problem: Discharge Planning  Goal: Discharge to home or other facility with appropriate resources  8/30/2022 0737 by Halle Pike RN  Outcome: Progressing  8/29/2022 2228 by Marti Gardiner RN  Outcome: Progressing     Problem: Safety - Adult  Goal: Free from fall injury  8/30/2022 0737 by Halle Pike RN  Outcome: Progressing  8/29/2022 2330 by Nikki Bryant RN  Outcome: Progressing  8/29/2022 2228 by Marti Gardiner RN  Outcome: Progressing     Problem: ABCDS Injury Assessment  Goal: Absence of physical injury  8/30/2022 0737 by Halle Pike RN  Outcome: Progressing  8/29/2022 2330 by Nikki Bryant RN  Outcome: Progressing  8/29/2022 2228 by Marti Gardiner RN  Outcome: Progressing

## 2022-08-30 NOTE — PROGRESS NOTES
Inpatient Cardiology Progress note     PATIENT IS BEING FOLLOWED FOR: HFrEF, Nonischemic Cardiomyopathy     Damian Park is a 20-year-old -American female who was seen in initial consultation with Dr. Silver Millard on 08/27/2022. SUBJECTIVE: Denies chest pain and dyspnea. Denies pain to right wrist cath site. OBJECTIVE: No apparent distress     ROS:  Consist: Denies fevers, chills or night sweats  Heart: Denies chest pain, palpitations, lightheadedness, dizziness or syncope  Lungs: Denies SOB, cough, wheezing, orthopnea or PND  GI: Denies abdominal pain, vomiting or diarrhea    PHYSICAL EXAM:   /68   Pulse 72   Temp 97 °F (36.1 °C) (Temporal)   Resp 16   Ht 5' (1.524 m)   Wt 183 lb 9.6 oz (83.3 kg)   SpO2 91%   BMI 35.86 kg/m²    CONST: Well developed, obese, middle-aged -American female who appears stated age. Awake, alert and cooperative. No apparent distress  HEENT:   Head- Normocephalic, atraumatic   Eyes- Conjunctivae pink, anicteric  Throat- Oral mucosa pink and moist  Neck-  No stridor, trachea midline, no jugular venous distention. No carotid bruit  CHEST: Chest symmetrical and non-tender to palpation. No accessory muscle use or intercostal retractions  RESPIRATORY:  Lung sounds - clear throughout fields   CARDIOVASCULAR:     Heart Inspection- shows no noted pulsations  Heart Palpation- no heaves or thrills; PMI is non-displaced   Heart Ausculation- Regular rate and rhythm, no murmur. No s3, s4 or rub   PV: No lower extremity edema. No varicosities. Pedal pulses palpable, no clubbing or cyanosis. Right wrist cath site with DSD in place without active bleeding or drainage, no hematoma, nontender with palpation, palpable right radial pulse. ABDOMEN: Soft, obese, non-tender to light palpation. Bowel sounds present. No palpable masses no organomegaly; no abdominal bruit  MS: Good muscle strength and tone. No atrophy or abnormal movements.    : Deferred  SKIN: Warm and dry no statis dermatitis or ulcers   NEURO / PSYCH: Oriented to person, place and time. Speech clear and appropriate. Follows all commands. Pleasant affect       Intake/Output Summary (Last 24 hours) at 8/30/2022 1125  Last data filed at 8/30/2022 0956  Gross per 24 hour   Intake 240 ml   Output --   Net 240 ml       Weight:   Wt Readings from Last 3 Encounters:   08/29/22 183 lb 9.6 oz (83.3 kg)   08/29/22 185 lb (83.9 kg)     Current Inpatient Medications:   lisinopril  2.5 mg Oral Daily    sodium chloride flush  5-40 mL IntraVENous 2 times per day    furosemide  40 mg Oral Daily    spironolactone  25 mg Oral Daily    apixaban  5 mg Oral BID    metoprolol succinate  25 mg Oral Daily    potassium bicarb-citric acid  40 mEq Oral Daily    amiodarone  400 mg Oral BID    Followed by    Fede Chung ON 9/3/2022] amiodarone  200 mg Oral Daily       IV Infusions (if any):   sodium chloride         DIAGNOSTIC/ LABORATORY DATA:  Labs:   CBC:   Recent Labs     08/29/22  0427 08/30/22  0530   WBC 4.9 5.7   HGB 14.1 14.1   HCT 42.8 42.0    303     BMP:   Recent Labs     08/29/22  0427 08/30/22  0530    136   K 3.1* 3.8   CO2 28 28   BUN 6 10   CREATININE 0.9 0.9   LABGLOM >60 >60   CALCIUM 8.1* 8.3*     Mag:   Recent Labs     08/29/22  0427 08/30/22  0530   MG 1.4* 1.9   HgA1c:   Lab Results   Component Value Date    LABA1C 7.1 (H) 08/26/2022   APTT:  Recent Labs     08/28/22  2241 08/29/22 0427   APTT 71.8* 75.9*   FASTING LIPID PANEL:  Lab Results   Component Value Date/Time    CHOL 163 08/26/2022 11:41 PM    HDL 29 08/26/2022 11:41 PM    LDLCALC 113 08/26/2022 11:41 PM    TRIG 107 08/26/2022 11:41 PM      Latest Reference Range & Units 8/26/22 17:50 8/26/22 21:42 8/26/22 23:41 8/27/22 10:20   Troponin, High Sensitivity 0 - 9 ng/L 122 (H) 486 (H) 584 (H) 436 (H)   (H): Data is abnormally high    08/26/2022 CXR:  1. Enlarged cardiac silhouette related to cardiomegaly or pericardial effusion.   2. Pulmonary vascular congestion. 08/26/2022 CTA Chest:  No evidence of pulmonary embolism or acute pulmonary abnormality. There is mild pulmonary edema and a mild right basilar pleural effusion. 08/27/2022 TTE (Dr. Hair Barrow):   Mildly dilated left ventricular chamber size. Severe global hypokinesis with regional variations in wall motion. There is apical akinesis. Visually estimated LVEF is 10%. Grade II diastolic dysfunction. Elevated LA pressure. Normal right ventricle structure and moderate function. The left atrium is moderately dilated. Normal right atrial size  Mild-moderate mitral regurgitation is present. Moderate tricuspid regurgitation. Moderate-severe pulmonary hypertension with a PASP of 64 mm Hg. Elevated RA pressure. There is a fixed echodensity measuring 2 x 1.8 cms at the LV apex. This is consistent with an LV thrombus. There is another fixed, smaller echodensity, also at the apex consistent with a thrombus. No comparison study available. 08/29/2022 Cardiac Catheterization (Dr Marisabel Friedman):   ANGIOGRAPHIC FINDINGS:  The left main coronary artery arises normally from the left sinus of Valsalva. It is relatively short vessel, mid-sized vessel without any disease. It trifurcates into left anterior descending artery and left circumflex artery. The left anterior descending artery extends down to the apex and mildly tortuous. It gives off good size diagonal branch that has no CAD. The ramus intermedius artery is a mid size vessel, tortuous without any disease. The left circumflex artery is large vessel, gives off two small OM branches and very large left posterolateral branch. The left circumflex artery and its branches have no CAD. The right coronary artery arises normally from the right sinus of  Valsalva. It is tortuous vessel, dominant circulation with 30% disease in the mid segment. Telemetry: Currently SR with HR 71.   Telemetry monitoring 08/29/2022 with 7 beats NSVT and 38 beats NSVT  12 lead EKG: NA      ASSESSMENT:   Nonischemic cardiomyopathy with mild disease involving the mid RCA, otherwise angiographically normal coronary arteries per cardiac catheterization 08/29/2022. EF 10% per TTE this admission  Acute decompensated HFrEF, improved. Now on PO Lasix  NSVT 08/29/2022. On PO Amiodarone without recurrence  LV thrombus noted on TTE this admission. On Eliquis  HLD, on statin  Morbid obesity  Hyperglycemia with HA1C of 7.1 on 08/26/2022  Nonproductive cough, Respiratory panel pending         PLAN:  Supplement potassium and magnesium. Recommend maintaining K>4 and Mg>2  Continue Toprol XL, Aldactone, and Jardiance   Hyperglycemia management per Primary Service  Check TFT   Continue Eliquis  Life Vest prior to discharge  Continue Lasix. Monitor daily weight, I&O, BMP  Discontinue Lisinopril, as Entresto initiated on 08/28/2022. Resume Entresto after 36 hour washout.    Will discuss case with Dr. Anisa Rendon     Electronically signed by Laurence Brandyn, APRN - CNP on 8/30/2022 at 11:25 AM

## 2022-08-30 NOTE — DISCHARGE SUMMARY
Physician Discharge Summary     Patient ID:  Severo Dangelo  [id number]  57 y.o.  1960    Admit date: 8/29/2022    Discharge date and time: No discharge date for patient encounter. Admitting Physician: Keith Perez MD     Discharge Physician: Keith Perez MD    Admission Diagnoses: CAD in native artery [I25.10]  Nonischemic cardiomyopathy ([address] [I42.8]    Discharge Diagnoses: Nonischemic cardiomyopathy, apical thrombus    Admission Condition: fair    Discharged Condition: fair    Indication for Admission: Cardiomyopathy     Hospital Course: did well post cardiac catheterization, had a few runs of nonsustained VT, attributed to cardiomyopathy, hypokalemia and hypomagnesemia, better after correcting electrolytes, was started on amiodarone, with no recurrence, on the day of discharge, patient was asymptomatic, ambulating in the room without any cardiac complaints, will be fitted with LifeVest prior to discharge    Consults: none    Significant Diagnostic Studies: angiography: 8/29/2022,IMPRESSION:  1. Mild disease involving mid RCA. Otherwise, angiographically normal  coronary arteries. 2.  Nonischemic cardiomyopathy. Echocardiogram 8/27/2022,Summary   Mildly dilated left ventricular chamber size. Severe global hypokinesis with regional variations in wall motion. There   is apical akinesis. Visually estimated LVEF is 10%. Grade II diastolic dysfunction. Elevated LA pressure. Normal right ventricle structure and moderate function. The left atrium is moderately dilated. Normal right atrial size   Mild-moderate mitral regurgitation is present. Moderate tricuspid regurgitation. Moderate-severe pulmonary hypertension with a PASP of 64 mm Hg. Elevated RA pressure. There is a fixed echodensity measuring 2 x 1.8 cms at the LV apex. This is   consistent with an LV thrombus. There is another fixed, smaller   echodensity, also at the apex consistent with a thrombus.    No comparison study available. Outstanding Order Results       Date and Time Order Name Status Description    8/30/2022 12:10 PM Respiratory Panel, Molecular, with COVID-19 (Restricted: peds pts or suitable admitted adults) In process     8/30/2022  5:30 AM T4, Free In process     8/30/2022  5:30 AM TSH In process               Discharge Exam:  /87   Pulse 79   Temp 97 °F (36.1 °C) (Temporal)   Resp 16   Ht 5' (1.524 m)   Wt 183 lb 9.6 oz (83.3 kg)   SpO2 91%   BMI 35.86 kg/m²   CONSTITUTIONAL:  awake, alert, cooperative, no apparent distress, and appears stated age  HEAD:  normocepalic, without obvious abnormality, atraumatic  NECK:  Supple, symmetrical, trachea midline, no adenopathy, thyroid symmetric, not enlarged and no tenderness, skin normal  LUNGS:  No increased work of breathing, good air exchange, clear to auscultation bilaterally, no crackles or wheezing  CARDIOVASCULAR: Laterally displaced PMI, regular rate and rhythm, normal S1 and S2, no S3 or S4, 3/6 systolic murmur at apex, 3/6 colic murmur at the left lower sternal border, no edema, no JVD, no carotid bruit. ABDOMEN:  Soft, nontender, no masses, no hepatomegaly, no splenomegaly, BS+  MUSCULOSKELETAL:  No clubbing no cyanosis. there is no redness, warmth, or swelling of the joints  full range of motion noted  NEUROLOGIC:  Alert, awake,oriented x3  SKIN:  no bruising or bleeding, normal skin color, texture, turgor and no redness, warmth, or swelling      Disposition: home    Patient Instructions:      Medication List        START taking these medications      amiodarone 200 MG tablet  Commonly known as: CORDARONE  Take 1 tablet by mouth 2 times daily for 60 doses     apixaban 5 MG Tabs tablet  Commonly known as: ELIQUIS  Take 1 tablet by mouth 2 times daily     furosemide 20 MG tablet  Commonly known as: LASIX  Take 1 tablet by mouth daily  Start taking on: August 31, 2022     metoprolol succinate 25 MG extended release tablet  Commonly known as: Leidy Hinds XL  Take 1 tablet by mouth daily  Start taking on: August 31, 2022     spironolactone 25 MG tablet  Commonly known as: ALDACTONE  Take 1 tablet by mouth daily  Start taking on: August 31, 2022            STOP taking these medications      amLODIPine 10 MG tablet  Commonly known as: NORVASC     simvastatin 20 MG tablet  Commonly known as: ZOCOR               Where to Get Your Medications        These medications were sent to [address] [facility], [address] [phone]  [address] [facility], [address] [place] [address]      Phone: [phone]   amiodarone 200 MG tablet  apixaban 5 MG Tabs tablet  furosemide 20 MG tablet  metoprolol succinate 25 MG extended release tablet  spironolactone 25 MG tablet         Activity: activity as tolerated  Diet: cardiac diet  Wound Care: none needed  Compliance with the medications and LifeVest was strongly emphasized, risks explained    Follow-up with the CHF clinic in 1 week  Follow-up with Dr. Guilherme Mims in 1-2 weeks  Was strongly encouraged to get established with PCP and follow-up with them soon as possible    Note that 31 minutes was spent in preparing discharge papers, discussing discharge with patient and family, medication review  Signed:  Asael Veal MD  8/30/2022  1:07 PM

## 2022-08-30 NOTE — PLAN OF CARE
Problem: Discharge Planning  Goal: Discharge to home or other facility with appropriate resources  8/30/2022 1405 by Cristobal Terrazas RN  Outcome: Completed  8/30/2022 0737 by Cristobal Terrazas RN  Outcome: Progressing     Problem: Safety - Adult  Goal: Free from fall injury  8/30/2022 1405 by Cristboal Terrazas RN  Outcome: Completed  8/30/2022 0737 by Cristobal Terrazas RN  Outcome: Progressing     Problem: ABCDS Injury Assessment  Goal: Absence of physical injury  8/30/2022 1405 by Cristobal Terrazas RN  Outcome: Completed  8/30/2022 0737 by Cristobal Terrazas RN  Outcome: Progressing

## 2022-08-30 NOTE — PROGRESS NOTES
Set patient up for PCP follow up on Thurs Sept 1 @ 1430 at [facility]ST[facility]. Bedside RL Quintana notified.     Celestine Sandifer, RN  08/30/22  11:54 AM

## 2022-08-31 ENCOUNTER — TELEPHONE (OUTPATIENT)
Dept: CARDIOLOGY CLINIC | Age: 62
End: 2022-08-31

## 2022-08-31 DIAGNOSIS — I50.9 CONGESTIVE HEART FAILURE, UNSPECIFIED HF CHRONICITY, UNSPECIFIED HEART FAILURE TYPE (HCC): ICD-10-CM

## 2022-08-31 DIAGNOSIS — I42.8 NONISCHEMIC CARDIOMYOPATHY (HCC): Primary | ICD-10-CM

## 2022-08-31 NOTE — TELEPHONE ENCOUNTER
----- Message from Samuel Celis MD sent at 8/30/2022  1:05 PM EDT -----  Hi Joana  Would you please make sure she is seen by the CHF clinic in a week, and have close follow-up with Dr. Marisol Shell  Thank you

## 2022-08-31 NOTE — TELEPHONE ENCOUNTER
Patient is scheduled with CHF clinic on September 9th and has a follow up with Mary Beth Hawkins on September 19th.

## 2022-09-01 ENCOUNTER — OFFICE VISIT (OUTPATIENT)
Dept: FAMILY MEDICINE CLINIC | Age: 62
End: 2022-09-01

## 2022-09-01 VITALS
DIASTOLIC BLOOD PRESSURE: 80 MMHG | HEART RATE: 88 BPM | WEIGHT: 180 LBS | TEMPERATURE: 97.4 F | HEIGHT: 60 IN | RESPIRATION RATE: 16 BRPM | OXYGEN SATURATION: 97 % | SYSTOLIC BLOOD PRESSURE: 130 MMHG | BODY MASS INDEX: 35.34 KG/M2

## 2022-09-01 DIAGNOSIS — I42.8 NONISCHEMIC CARDIOMYOPATHY (HCC): ICD-10-CM

## 2022-09-01 DIAGNOSIS — I25.10 CAD IN NATIVE ARTERY: Primary | ICD-10-CM

## 2022-09-01 DIAGNOSIS — I21.4 NSTEMI (NON-ST ELEVATED MYOCARDIAL INFARCTION) (HCC): ICD-10-CM

## 2022-09-01 PROCEDURE — 99204 OFFICE O/P NEW MOD 45 MIN: CPT | Performed by: FAMILY MEDICINE

## 2022-09-01 SDOH — HEALTH STABILITY: PHYSICAL HEALTH: ON AVERAGE, HOW MANY MINUTES DO YOU ENGAGE IN EXERCISE AT THIS LEVEL?: 30 MIN

## 2022-09-01 SDOH — ECONOMIC STABILITY: FOOD INSECURITY: WITHIN THE PAST 12 MONTHS, THE FOOD YOU BOUGHT JUST DIDN'T LAST AND YOU DIDN'T HAVE MONEY TO GET MORE.: NEVER TRUE

## 2022-09-01 SDOH — ECONOMIC STABILITY: FOOD INSECURITY: WITHIN THE PAST 12 MONTHS, YOU WORRIED THAT YOUR FOOD WOULD RUN OUT BEFORE YOU GOT MONEY TO BUY MORE.: NEVER TRUE

## 2022-09-01 SDOH — ECONOMIC STABILITY: TRANSPORTATION INSECURITY
IN THE PAST 12 MONTHS, HAS THE LACK OF TRANSPORTATION KEPT YOU FROM MEDICAL APPOINTMENTS OR FROM GETTING MEDICATIONS?: NO

## 2022-09-01 SDOH — ECONOMIC STABILITY: HOUSING INSECURITY
IN THE LAST 12 MONTHS, WAS THERE A TIME WHEN YOU DID NOT HAVE A STEADY PLACE TO SLEEP OR SLEPT IN A SHELTER (INCLUDING NOW)?: NO

## 2022-09-01 SDOH — ECONOMIC STABILITY: TRANSPORTATION INSECURITY
IN THE PAST 12 MONTHS, HAS LACK OF TRANSPORTATION KEPT YOU FROM MEETINGS, WORK, OR FROM GETTING THINGS NEEDED FOR DAILY LIVING?: NO

## 2022-09-01 SDOH — HEALTH STABILITY: PHYSICAL HEALTH: ON AVERAGE, HOW MANY DAYS PER WEEK DO YOU ENGAGE IN MODERATE TO STRENUOUS EXERCISE (LIKE A BRISK WALK)?: 7 DAYS

## 2022-09-01 SDOH — ECONOMIC STABILITY: INCOME INSECURITY: IN THE LAST 12 MONTHS, WAS THERE A TIME WHEN YOU WERE NOT ABLE TO PAY THE MORTGAGE OR RENT ON TIME?: NO

## 2022-09-01 ASSESSMENT — ENCOUNTER SYMPTOMS
RHINORRHEA: 0
SINUS PAIN: 0
EYE DISCHARGE: 0
PHOTOPHOBIA: 0
SHORTNESS OF BREATH: 0
COUGH: 0
EYE REDNESS: 0
GASTROINTESTINAL NEGATIVE: 1

## 2022-09-01 ASSESSMENT — PATIENT HEALTH QUESTIONNAIRE - PHQ9
1. LITTLE INTEREST OR PLEASURE IN DOING THINGS: NOT AT ALL
SUM OF ALL RESPONSES TO PHQ QUESTIONS 1-9: 0
DEPRESSION UNABLE TO ASSESS: YES
SUM OF ALL RESPONSES TO PHQ QUESTIONS 1-9: 0
SUM OF ALL RESPONSES TO PHQ QUESTIONS 1-9: 0
2. FEELING DOWN, DEPRESSED OR HOPELESS: 0
SUM OF ALL RESPONSES TO PHQ9 QUESTIONS 1 & 2: 0
1. LITTLE INTEREST OR PLEASURE IN DOING THINGS: 0
SUM OF ALL RESPONSES TO PHQ QUESTIONS 1-9: 0
SUM OF ALL RESPONSES TO PHQ9 QUESTIONS 1 & 2: 0
2. FEELING DOWN, DEPRESSED OR HOPELESS: NOT AT ALL

## 2022-09-01 ASSESSMENT — SOCIAL DETERMINANTS OF HEALTH (SDOH)
WITHIN THE LAST YEAR, HAVE YOU BEEN KICKED, HIT, SLAPPED, OR OTHERWISE PHYSICALLY HURT BY YOUR PARTNER OR EX-PARTNER?: NO
HOW OFTEN DO YOU ATTENT MEETINGS OF THE CLUB OR ORGANIZATION YOU BELONG TO?: NEVER
WITHIN THE LAST YEAR, HAVE YOU BEEN AFRAID OF YOUR PARTNER OR EX-PARTNER?: NO
HOW HARD IS IT FOR YOU TO PAY FOR THE VERY BASICS LIKE FOOD, HOUSING, MEDICAL CARE, AND HEATING?: NOT VERY HARD
HOW OFTEN DO YOU GET TOGETHER WITH FRIENDS OR RELATIVES?: THREE TIMES A WEEK
WITHIN THE LAST YEAR, HAVE YOU BEEN HUMILIATED OR EMOTIONALLY ABUSED IN OTHER WAYS BY YOUR PARTNER OR EX-PARTNER?: NO
IN A TYPICAL WEEK, HOW MANY TIMES DO YOU TALK ON THE PHONE WITH FAMILY, FRIENDS, OR NEIGHBORS?: MORE THAN THREE TIMES A WEEK
WITHIN THE LAST YEAR, HAVE TO BEEN RAPED OR FORCED TO HAVE ANY KIND OF SEXUAL ACTIVITY BY YOUR PARTNER OR EX-PARTNER?: NO
DO YOU BELONG TO ANY CLUBS OR ORGANIZATIONS SUCH AS CHURCH GROUPS UNIONS, FRATERNAL OR ATHLETIC GROUPS, OR SCHOOL GROUPS?: NO
HOW OFTEN DO YOU ATTEND CHURCH OR RELIGIOUS SERVICES?: NEVER

## 2022-09-01 ASSESSMENT — LIFESTYLE VARIABLES
HOW MANY STANDARD DRINKS CONTAINING ALCOHOL DO YOU HAVE ON A TYPICAL DAY: PATIENT DOES NOT DRINK
HOW OFTEN DO YOU HAVE A DRINK CONTAINING ALCOHOL: NEVER

## 2022-09-01 NOTE — PROGRESS NOTES
2022    Vivi Huston    Chief Complaint   Patient presents with    New Patient     Previously saw Dr. Richard Montez       HPI  History was obtained from patient. Michael Gamez is a 57 y.o. female who presents today with the followin. CAD in native artery    2. Nonischemic cardiomyopathy [address] [address]    3. NSTEMI (non-ST elevated myocardial infarction) [facility])       Patient is here for post hospital visit. Patient diagnosed with a non-STEMI a few days ago. She was discharged from the hospital 2 days ago and prescribed a number of medications including Lasix amiodarone metoprolol spironolactone and Eliquis. Patient was able to fill all the medications with the exception of the Eliquis and cannot afford to get that. Patient was not given any medication at the time of discharge from the hospital.  Patient's echocardiogram showed a probable blood clot in the left ventricle which is why she was placed on the Eliquis along with a 10% ejection fraction. Patient states that she is not short of breath can walk up a flight of steps without shortness of breath. Patient states that she feels fine today. She is taking all the other prescribed medications. Was discharged with a LifeVest which she is currently wearing. REVIEW OF SYMPTOMS    Review of Systems   Constitutional:  Negative for chills, fatigue and fever. HENT:  Negative for congestion, mouth sores, postnasal drip, rhinorrhea and sinus pain. Eyes:  Negative for photophobia, discharge and redness. Respiratory:  Negative for cough and shortness of breath. Cardiovascular:  Negative for chest pain. Gastrointestinal: Negative. Genitourinary:  Negative for difficulty urinating, dysuria, hematuria and urgency. Neurological:  Negative for headaches. Psychiatric/Behavioral:  Negative for sleep disturbance.       PAST MEDICAL HISTORY  Past Medical History:   Diagnosis Date    Hyperlipidemia     Hypertension        FAMILY HISTORY  Family History   Problem Relation Age of Onset    Heart Failure Mother          at age 80 years, was a smoker    Atrial Fibrillation Mother     Heart Failure Father          at age 71, was a smoker    Diabetes Father     Heart Attack Sister          at age 48 years, smoked    Arthritis Sister         smoked    Diabetes type 2  Brother         smoked    Diabetes type 2  Brother         smoked    Obesity Brother     Diabetes Brother         did not smoke       SOCIAL HISTORY  Social History     Socioeconomic History    Marital status:      Spouse name: 2nd : Mr. 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  She had had her first cigarette at age 22 years, for 42 years she had averaged 1/4 PPD for 10.5 pack-years   Vaping Use    Vaping Use: Never used   Substance and Sexual Activity    Alcohol use: Not Currently     Comment: used to drink 2 beers every 3 weeks    Drug use: Never   Social History Narrative    CODE STATUS: Full Code    HEALTH CARE PROXY: her , Mr. Jennifer Ivey, [phone]    AMBULATES: independently    DOMICILED: has stairs in the home, uses the stairs, lives with her  and her brother, has no home pets     Social Determinants of Health     Financial Resource Strain: Low Risk     Difficulty of Paying Living Expenses: Not very hard   Food Insecurity: No Food Insecurity    Worried About Running Out of Food in the Last Year: Never true    [address] [facility] [address] in the Last Year: Never true   Transportation Needs: No Transportation Needs    Lack of Transportation (Medical): No    Lack of Transportation (Non-Medical): No   Physical Activity: Sufficiently Active    Days of Exercise per Week: 7 days    Minutes of Exercise per Session: 30 min   Stress: No Stress Concern Present    Feeling of Stress : Not at all   Social Connections: Moderately Isolated    Frequency of Communication with Friends and Family: More than three times a week    Frequency of Social Gatherings with Friends and Family: Three times a week    Attends Sikh Services: Never    Active Member of Clubs or Organizations: No    Attends Club or Organization Meetings: Never    Marital Status:    Intimate Partner Violence: Not At Risk    Fear of Current or Ex-Partner: No    Emotionally Abused: No    Physically Abused: No    Sexually Abused: No   Housing Stability: Unknown    Unable to Pay for Housing in the Last Year: No    Unstable Housing in the Last Year: No        SURGICAL HISTORY  Past Surgical History:   Procedure Laterality Date    ANKLE SURGERY Right     2012 & 2020                 CURRENT MEDICATIONS  Current Outpatient Medications   Medication Sig Dispense Refill    amiodarone (CORDARONE) 200 MG tablet Take 1 tablet by mouth 2 times daily for 60 doses 60 tablet 0    apixaban (ELIQUIS) 5 MG TABS tablet Take 1 tablet by mouth 2 times daily 180 tablet 2    furosemide (LASIX) 20 MG tablet Take 1 tablet by mouth daily 90 tablet 2    metoprolol succinate (TOPROL XL) 25 MG extended release tablet Take 1 tablet by mouth daily 90 tablet 3    spironolactone (ALDACTONE) 25 MG tablet Take 1 tablet by mouth daily 90 tablet 3     No current facility-administered medications for this visit. ALLERGIES  No Known Allergies    PHYSICAL EXAM    /80   Pulse 88   Temp 97.4 °F (36.3 °C)   Resp 16   Ht 5' (1.524 m)   Wt 180 lb (81.6 kg)   SpO2 97%   BMI 35.15 kg/m²     Physical Exam  Vitals and nursing note reviewed. Constitutional:       Appearance: Normal appearance.    HENT: Right Ear: Tympanic membrane and ear canal normal.      Left Ear: Tympanic membrane and ear canal normal.      Nose: No congestion or rhinorrhea. Mouth/Throat:      Mouth: Mucous membranes are moist.      Pharynx: Oropharynx is clear. Eyes:      General: No scleral icterus. Conjunctiva/sclera: Conjunctivae normal.   Cardiovascular:      Rate and Rhythm: Normal rate and regular rhythm. Heart sounds: Normal heart sounds. No murmur heard. Pulmonary:      Effort: Pulmonary effort is normal.      Breath sounds: Normal breath sounds. Musculoskeletal:      Cervical back: Neck supple. Right lower leg: No edema. Left lower leg: No edema. Skin:     General: Skin is warm. Neurological:      Mental Status: She is alert and oriented to person, place, and time. Psychiatric:         Mood and Affect: Mood normal.       CAROL & Jeremias Nette was seen today for new patient. Diagnoses and all orders for this visit:    CAD in native artery  Patient has a follow-up visit with CHF clinic next week and with cardiology the following week. Patient is to continue using the LifeVest as instructed. Nonischemic cardiomyopathy ([address]  -     [facility] Referral to Social Work (Primary Care Only)    NSTEMI (non-ST elevated myocardial infarction) [facility])  [facility]. We are only able to leave a message with the  on her voicemail. [address] was contacted and they were able to send out a indigent patient form which was completed by both the patient and myself and faxed back at the time of her office visit. If she qualifies they will mail out Eliquis but she does not have any at this time. Return in about 2 weeks (around 9/15/2022). Electronically signed by Dre Stevens.  Zaira Garcia DO on 9/1/2022

## 2022-09-02 ENCOUNTER — TELEPHONE (OUTPATIENT)
Dept: FAMILY MEDICINE CLINIC | Age: 62
End: 2022-09-02

## 2022-09-02 NOTE — TELEPHONE ENCOUNTER
LSW initiated phone call to patient regarding referral to Social Work Department. LSW was unable to make contact with pt, but left a message with reason for call and contact information for pt to return call. LSW will continue to attempt contact with pt.

## 2022-09-07 ENCOUNTER — TELEPHONE (OUTPATIENT)
Dept: FAMILY MEDICINE CLINIC | Age: 62
End: 2022-09-07

## 2022-09-07 NOTE — TELEPHONE ENCOUNTER
LSW initiated second phone call to patient regarding referral to Social Work. LSW was able to make contact with pt. Pt stated that pt had just received previous voicemail. Pt stated that she was having difficulty getting her Eliquis prescription. Pt stated that she filled out a form while in the office to get it, but has not heard back. LSW provided pt with information on [employer]. Pt stated that she would like to see if they could help her. LSW provided pt with contact information. Pt was receptive. LSW also educated pt on [address], LSW explained that it would cut down on the cost, but anticipated that the medication would still be expensive for a month supply. Pt stated that she felt it may be easier to contact cardiologist and switch medications. LSW stated that she could not advise pt on switching medications and recommended contacting her doctor. LSW inquired if pt needed further assistance. Pt denied. LSW provided pt with contact information if further assistance was needed.

## 2022-09-09 ENCOUNTER — HOSPITAL ENCOUNTER (OUTPATIENT)
Dept: OTHER | Age: 62
Setting detail: THERAPIES SERIES
Discharge: HOME | End: 2022-09-09

## 2022-09-09 VITALS
DIASTOLIC BLOOD PRESSURE: 88 MMHG | RESPIRATION RATE: 16 BRPM | SYSTOLIC BLOOD PRESSURE: 142 MMHG | OXYGEN SATURATION: 98 % | HEIGHT: 60 IN | HEART RATE: 68 BPM | BODY MASS INDEX: 34.36 KG/M2 | WEIGHT: 175 LBS

## 2022-09-09 LAB
ANION GAP SERPL CALCULATED.3IONS-SCNC: 9 MMOL/L (ref 7–16)
BUN BLDV-MCNC: 29 MG/DL (ref 6–23)
CALCIUM SERPL-MCNC: 9 MG/DL (ref 8.6–10.2)
CHLORIDE BLD-SCNC: 103 MMOL/L (ref 98–107)
CO2: 23 MMOL/L (ref 22–29)
CREAT SERPL-MCNC: 1.1 MG/DL (ref 0.5–1)
GFR AFRICAN AMERICAN: >60
GFR NON-AFRICAN AMERICAN: >60 ML/MIN/1.73
GLUCOSE BLD-MCNC: 120 MG/DL (ref 74–99)
POTASSIUM SERPL-SCNC: 4.6 MMOL/L (ref 3.5–5)
PRO-BNP: 1964 PG/ML (ref 0–125)
SODIUM BLD-SCNC: 135 MMOL/L (ref 132–146)

## 2022-09-09 PROCEDURE — 99204 OFFICE O/P NEW MOD 45 MIN: CPT

## 2022-09-09 PROCEDURE — 83880 ASSAY OF NATRIURETIC PEPTIDE: CPT

## 2022-09-09 PROCEDURE — 36415 COLL VENOUS BLD VENIPUNCTURE: CPT

## 2022-09-09 PROCEDURE — 80048 BASIC METABOLIC PNL TOTAL CA: CPT

## 2022-09-09 NOTE — PROGRESS NOTES
Congestive Heart Failure [facility]     TITRATION PATIENT     Court Stockton   1960    Referring Provider: Dr. Guilherme Mims   Primary Care Physician: Dr. Nena Martinez DO  Cardiologist: DR. Guilherme Mims   Nephrologist: N/A    History of Present Illness:     Court Stockton is a 57 y.o. female with a history of HFrEF, most recent EF 10% 8/27/2022. Currently wearing a lifevest. Denies any alarms or alerts. Patient Story:  She does not  have dyspnea with exertion, shortness of breath, or decline in overall functional capacity. She does not have orthopnea, PND, nocturnal cough or hemoptysis. She does not have abdominal distention or bloating, early satiety, anorexia/change in appetite. She does has a good urinary response to  oral diuretic. She does not have  lower extremity edema. She denies lightheadedness, dizziness. She denies palpitations, syncope or near syncope. She does not complain of chest pain, pressure, discomfort. No Known Allergies    Current Outpatient Medications on File Prior to Encounter   Medication Sig Dispense Refill    amiodarone (CORDARONE) 200 MG tablet Take 1 tablet by mouth 2 times daily for 60 doses 60 tablet 0    apixaban (ELIQUIS) 5 MG TABS tablet Take 1 tablet by mouth 2 times daily 180 tablet 2    furosemide (LASIX) 20 MG tablet Take 1 tablet by mouth daily 90 tablet 2    metoprolol succinate (TOPROL XL) 25 MG extended release tablet Take 1 tablet by mouth daily 90 tablet 3    spironolactone (ALDACTONE) 25 MG tablet Take 1 tablet by mouth daily 90 tablet 3    [DISCONTINUED] amLODIPine (NORVASC) 10 MG tablet Take 20 mg by mouth daily      [DISCONTINUED] simvastatin (ZOCOR) 20 MG tablet Take 20 mg by mouth nightly       No current facility-administered medications on file prior to encounter.      Guideline directed medical:  ARNI/ACE I/ARB: No  Beta blocker:  Yes metoprolol succinate 25 mg daily   Aldosterone antagonist:  Yes spirolactone 25 mg daily   SGLT2i:  No    Physical Examination:     BP (!) 142/88   Pulse 68   Resp 16   Ht 5' (1.524 m)   Wt 175 lb (79.4 kg)   SpO2 98%   BMI 34.18 kg/m²     Assessment  Charting Type: Admission (CHF clinic)    Neurological  Level of Consciousness: Alert (0)    Respiratory  Respiratory Pattern: Regular  Respiratory Depth: Normal  Respiratory Quality/Effort: Unlabored  Chest Assessment: Chest expansion symmetrical  L Breath Sounds: Clear  R Breath Sounds: Clear    Cough/Sputum  Cough: None    Cardiac  Cardiac Regularity: Regular  Heart Sounds: S1, S2  Cardiac Rhythm: Sinus rhythm    Rhythm Interpretation  Heart Rate: 68    Gastrointestinal  Abdominal (WDL): Within Defined Limits  Abdomen Inspection: Soft  RUQ Bowel Sounds: Active  LUQ Bowel Sounds: Active  RLQ Bowel Sounds: Active  LLQ Bowel Sounds: Active     Bowel Sounds  RUQ Bowel Sounds: Active  LUQ Bowel Sounds: Active  RLQ Bowel Sounds: Active  LLQ Bowel Sounds:  Active    Peripheral Vascular  Peripheral Vascular (WDL): Within Defined Limits  Edema: Right lower extremity, Left lower extremity  RLE Edema: None  LLE Edema: None    Genitourinary  Genitourinary (WDL): Within Defined Limits (has a good urinary response with oral diuretics.)    Heart Rate: 68      LAB DATA:    Last 3 BMP      Sodium (mmol/L)   Date Value   09/09/2022 135   08/30/2022 136   08/29/2022 138     Potassium (mmol/L)   Date Value   09/09/2022 4.6   08/30/2022 3.8   08/28/2022 3.4 (L)     Potassium reflex Magnesium (mmol/L)   Date Value   08/29/2022 3.1 (L)   08/27/2022 3.9   08/26/2022 4.0     Chloride (mmol/L)   Date Value   09/09/2022 103   08/30/2022 100   08/29/2022 98     CO2 (mmol/L)   Date Value   09/09/2022 23   08/30/2022 28   08/29/2022 28     BUN (mg/dL)   Date Value   09/09/2022 29 (H)   08/30/2022 10   08/29/2022 6     Glucose (mg/dL)   Date Value   09/09/2022 120 (H)   08/30/2022 112 (H)   08/29/2022 90     Calcium (mg/dL)   Date Value   09/09/2022 9.0 08/30/2022 8.3 (L)   08/29/2022 8.1 (L)       Last 3 BNP       Pro-BNP (pg/mL)   Date Value   09/09/2022 1,964 (H)   08/29/2022 2,181 (H)   08/26/2022 14,311 (H)          CBC: No results for input(s): WBC, HGB, PLT in the last 72 hours. BMP:    Recent Labs     09/09/22  1130      K 4.6      CO2 23   BUN 29*   CREATININE 1.1*   GLUCOSE 120*     Hepatic: No results for input(s): AST, ALT, ALB, BILITOT, ALKPHOS in the last 72 hours. Troponin: No results for input(s): TROPONINI in the last 72 hours. BNP: No results for input(s): BNP in the last 72 hours. Lipids: No results for input(s): CHOL, HDL in the last 72 hours. Invalid input(s): LDLCALCU  INR: No results for input(s): INR in the last 72 hours. WEIGHTS:    Wt Readings from Last 3 Encounters:   09/09/22 175 lb (79.4 kg)   09/01/22 180 lb (81.6 kg)   08/29/22 183 lb 9.6 oz (83.3 kg)     TELEMETRY:  Cardiac Regularity: Regular  Cardiac Rhythm/Interpretation: SR    ASSESSMENT:  India Morillo is present for her first visit with CHF clinic today. Patient presented with Alton Fowler. Discussed with patient and Liliam Araujo the purpose of CHF clinic and importance of daily weights and doing a self check every day to monitor for changes. Pt was hospitalized on 8/26-8/30 and new onset systolic dysfunction. Patient was started on entresto and jardiance however was not discharged with this medications. Will contact provider re: medication titration. Went over the three heart failure zones and to call cardiologist if in yellow zone immediately to prevent any further decline. Pt seen Dr. Dre Abel on 9/1 and no medications. Patient is agreeable to future CHF clinic visits. Pt will have a follow up appointment next week for the week 2 visit. Pt has an appointment with Adrian BRONSON on 9/19th for week 3 visit. At next visit-- will discussed cardiac rehab and go more in-depth with low sodium diet.      Interventions completed this visit:  IV diuretics given no  Lab work obtained yes, BNP BMP   Reviewed currently prescribed medications with patient, educated on importance of compliance and answered any questions regarding their medication  Educated on signs and symptoms of HF  Educated on low sodium diet    PLAN:  Scheduled to follow up in CHF clinic on   Future Appointments   Date Time Provider Arcadio Phipps   9/15/2022 11:45 AM DO Kendall Dias [facility]   9/16/2022 10:30 AM [facility] CHF ROOM 1 [facility] Radha Champagne   9/19/2022  8:00 AM Leesa Closs, APRN - CNP [facility]     Given clinic phone number and aware of signs and symptoms to call with any HF change in symptoms.

## 2022-09-11 NOTE — H&P
Department of Medicine  Section of Cardiology  Attending Procedure History and Physical        Pre-Procedural Diagnosis: Non-STEMI    Indications: STEMI    Procedure Planned: Cardiac catheterization    History obtained from patient    History of Present Illness: The patient is a 57 y.o. female who presents for the above procedure. Presented to the hospital with non-ST elevation MI, patient is here for cardiac catheterization for further evaluation    Past Medical History:        Diagnosis Date    Hyperlipidemia     Hypertension        Past Surgical History:        Procedure Laterality Date    ANKLE SURGERY Right     2012 & 2020     Medications:    Current Outpatient Rx   Medication Sig Dispense Refill    amiodarone (CORDARONE) 200 MG tablet Take 1 tablet by mouth 2 times daily for 60 doses 60 tablet 0    apixaban (ELIQUIS) 5 MG TABS tablet Take 1 tablet by mouth 2 times daily 180 tablet 2    furosemide (LASIX) 20 MG tablet Take 1 tablet by mouth daily 90 tablet 2    metoprolol succinate (TOPROL XL) 25 MG extended release tablet Take 1 tablet by mouth daily 90 tablet 3    spironolactone (ALDACTONE) 25 MG tablet Take 1 tablet by mouth daily 90 tablet 3       Allergies:  Patient has no known allergies.     History of allergic reaction to anesthesia:  no    Social History  Non-smoker    REVIEW OF SYSTEMS  CONSTITUTIONAL:  negative for  fevers, chills  HEENT:  negative for earaches, nasal congestion and epistaxis  RESPIRATORY:  negative for  dry cough, cough with sputum,wheezing and hemoptysis  GASTROINTESTINAL:  negative for nausea, vomiting  MUSCULOSKELETAL:  negative for  myalgias, arthralgias  NEUROLOGICAL:  negative for visual disturbance, dysphagia     PHYSICAL EXAM    BP (!) 98/95   Pulse 70   Temp (!) 96.7 °F (35.9 °C) (Temporal)   Resp 18   Ht 5' (1.524 m)   Wt 183 lb 9.6 oz (83.3 kg)   SpO2 93%   BMI 35.86 kg/m²     CONSTITUTIONAL:  awake, alert, cooperative, no apparent distress, and appears stated age  EYES:  lids and lashes normal and pupils equal, round and reactive to light  HEAD:  normocepalic, without obvious abnormality, atraumatic  NECK:  Supple, symmetrical, trachea midline, no adenopathy, thyroid symmetric, not enlarged and no tenderness, skin normal  HEMATOLOGIC/LYMPHATICS:  no cervical lymphadenopathy and no supraclavicular lymphadenopathy  LUNGS:  No increased work of breathing, good air exchange, clear to auscultation bilaterally, no crackles or wheezing  CARDIOVASCULAR: Laterally displaced PMI, regular rate and rhythm, normal S1 and S2, no S3 or S4, and no murmur noted and no JVD, no carotid bruit. ABDOMEN:  Soft, nontender, no masses, no hepatomegaly, no splenomegaly, BS+  CHEST: nontender to palpation, expands symmetrically  MUSCULOSKELETAL:  No clubbing no cyanosis. there is no redness, warmth, or swelling of the joints  full range of motion noted  NEUROLOGIC:  Alert, awake,oriented x3  SKIN:  no bruising or bleeding, normal skin color, texture, turgor and no redness, warmth, or swelling  DATA    CBC:    Lab Results   Component Value Date/Time    WBC 5.7 08/30/2022 05:30 AM    RBC 5.00 08/30/2022 05:30 AM    HGB 14.1 08/30/2022 05:30 AM    HCT 42.0 08/30/2022 05:30 AM    MCV 84.0 08/30/2022 05:30 AM    RDW 16.0 08/30/2022 05:30 AM     08/30/2022 05:30 AM     Platelet:    Lab Results   Component Value Date/Time     08/30/2022 05:30 AM     BUN/Cr:    Lab Results   Component Value Date/Time    BUN 29 09/09/2022 11:30 AM     Potassium:    Lab Results   Component Value Date/Time    K 4.6 09/09/2022 11:30 AM    K 3.1 08/29/2022 04:27 AM     PT/INR:  No results found for: PTINR  PTT:    Lab Results   Component Value Date/Time    APTT 75.9 08/29/2022 04:27 AM         ASSESSMENT AND PLAN    1. Patient is a 57 y.o. female with above specified procedure planned cardiac catheterization under conscious sedation    Expected Sedation/Anesthesia Type:  conscious sedation    2. ASA ([address] Anesthesiology) Anesthesia Status:  2    3. Procedure options, risks and benefits reviewed with Patient. Patient expresses understanding    4. Patient has been on anticoagulation medications to include Low Molecular Weight Heparin; discontinued yesterday    5. Consent has been signed:   Yes

## 2022-09-15 ENCOUNTER — OFFICE VISIT (OUTPATIENT)
Dept: FAMILY MEDICINE CLINIC | Age: 62
End: 2022-09-15

## 2022-09-15 VITALS
HEART RATE: 69 BPM | BODY MASS INDEX: 34.71 KG/M2 | RESPIRATION RATE: 16 BRPM | HEIGHT: 60 IN | WEIGHT: 176.8 LBS | OXYGEN SATURATION: 98 % | DIASTOLIC BLOOD PRESSURE: 80 MMHG | SYSTOLIC BLOOD PRESSURE: 130 MMHG | TEMPERATURE: 97.4 F

## 2022-09-15 DIAGNOSIS — I25.10 CAD IN NATIVE ARTERY: ICD-10-CM

## 2022-09-15 DIAGNOSIS — E03.9 HYPOTHYROIDISM, UNSPECIFIED TYPE: Primary | ICD-10-CM

## 2022-09-15 DIAGNOSIS — I42.8 NONISCHEMIC CARDIOMYOPATHY (HCC): ICD-10-CM

## 2022-09-15 DIAGNOSIS — I10 PRIMARY HYPERTENSION: ICD-10-CM

## 2022-09-15 DIAGNOSIS — I50.9 ACUTE CONGESTIVE HEART FAILURE, UNSPECIFIED HEART FAILURE TYPE (HCC): ICD-10-CM

## 2022-09-15 PROCEDURE — 99214 OFFICE O/P EST MOD 30 MIN: CPT | Performed by: FAMILY MEDICINE

## 2022-09-15 RX ORDER — LEVOTHYROXINE SODIUM 0.05 MG/1
50 TABLET ORAL DAILY
Qty: 30 TABLET | Refills: 5 | Status: SHIPPED | OUTPATIENT
Start: 2022-09-15

## 2022-09-15 NOTE — PROGRESS NOTES
2022    [address]    Chief Complaint   Patient presents with    Coronary Artery Disease     Patient here for 2 week follow up. No new complaints. She did get Eliquis. HPI  History was obtained from patient. Uriel Orellana is a 57 y.o. female who presents today with the followin. Hypothyroidism, unspecified type    2. Acute congestive heart failure, unspecified heart failure type ([address]    3. Primary hypertension    4. CAD in native artery    5. Nonischemic cardiomyopathy (HCC)    Aleksandra Faustina is here today for follow-up of CHF and cardiac thrombus. Patient has gotten her Eliquis from the pharmaceutical company. She is taking all of her other medications as prescribed. She will be following up with the CHF clinic and cardiology. Patient denies any shortness of breath or chest discomfort. She has had no lower extremity edema since her discharge from the hospital.     REVIEW OF SYMPTOMS    Review of Systems   Constitutional:  Negative for chills, fatigue and fever. HENT:  Negative for congestion, mouth sores, postnasal drip, rhinorrhea and sinus pain. Eyes:  Negative for photophobia, discharge and redness. Respiratory:  Negative for cough and shortness of breath. Cardiovascular:  Negative for chest pain. Gastrointestinal: Negative. Genitourinary:  Negative for difficulty urinating, dysuria, hematuria and urgency. Neurological:  Negative for headaches. Psychiatric/Behavioral:  Negative for sleep disturbance.       PAST MEDICAL HISTORY  Past Medical History:   Diagnosis Date    Hyperlipidemia     Hypertension        FAMILY HISTORY  Family History   Problem Relation Age of Onset    Heart Failure Mother          at age 80 years, was a smoker    Atrial Fibrillation Mother     Heart Failure Father          at age 71, was a smoker    Diabetes Father     Heart Attack Sister          at age 48 years, smoked    Arthritis Sister         smoked    Diabetes type 2  Brother smoked    Diabetes type 2  Brother         smoked    Obesity Brother     Diabetes Brother         did not smoke       SOCIAL HISTORY  Social History     Socioeconomic History    Marital status:      Spouse name: 2nd : Mr. 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 She had had her first cigarette at age 22 years, for 42 years she had averaged 1/4 PPD for 10.5 pack-years   Vaping Use    Vaping Use: Never used   Substance and Sexual Activity    Alcohol use: Not Currently     Comment: used to drink 2 beers every 3 weeks    Drug use: Never   Social History Narrative    CODE STATUS: Full Code    HEALTH CARE PROXY: her , Mr. Brice Hanson, [phone]    AMBULATES: independently    DOMICILED: has stairs in the home, uses the stairs, lives with her  and her brother, has no home pets     Social Determinants of Health     Financial Resource Strain: Low Risk     Difficulty of Paying Living Expenses: Not very hard   Food Insecurity: No Food Insecurity    Worried About Running Out of Food in the Last Year: Never true    [address] in the Last Year: Never true   Transportation Needs: No Transportation Needs    Lack of Transportation (Medical): No    Lack of Transportation (Non-Medical): No   Physical Activity: Sufficiently Active    Days of Exercise per Week: 7 days    Minutes of Exercise per Session: 30 min   Stress: No Stress Concern Present    Feeling of Stress : Not at all   Social Connections:  Moderately Isolated    Frequency of Communication with Friends and Family: More than three times a week    Frequency of Social Gatherings with Friends and Family: Three times a week    Attends [employer] Services: Never    Active Member of Clubs or Organizations: No    Attends Club or Organization Meetings: Never    Marital Status:    Intimate Partner Violence: Not At Risk    Fear of Current or Ex-Partner: No    Emotionally Abused: No    Physically Abused: No    Sexually Abused: No   Housing Stability: Unknown    Unable to Pay for Housing in the Last Year: No    Unstable Housing in the Last Year: No        SURGICAL HISTORY  Past Surgical History:   Procedure Laterality Date    ANKLE SURGERY Right     2012 & 2020                 CURRENT MEDICATIONS  Current Outpatient Medications   Medication Sig Dispense Refill    levothyroxine (SYNTHROID) 50 MCG tablet Take 1 tablet by mouth daily 30 tablet 5    amiodarone (CORDARONE) 200 MG tablet Take 1 tablet by mouth 2 times daily for 60 doses 60 tablet 0    apixaban (ELIQUIS) 5 MG TABS tablet Take 1 tablet by mouth 2 times daily 180 tablet 2    furosemide (LASIX) 20 MG tablet Take 1 tablet by mouth daily 90 tablet 2    metoprolol succinate (TOPROL XL) 25 MG extended release tablet Take 1 tablet by mouth daily 90 tablet 3    spironolactone (ALDACTONE) 25 MG tablet Take 1 tablet by mouth daily 90 tablet 3    sacubitril-valsartan (ENTRESTO) 24-26 MG per tablet Take 1 tablet by mouth 2 times daily (Patient not taking: Reported on 9/16/2022) 60 tablet 3     No current facility-administered medications for this visit. ALLERGIES  No Known Allergies    PHYSICAL EXAM    /80   Pulse 69   Temp 97.4 °F (36.3 °C)   Resp 16   Ht 5' (1.524 m)   Wt 176 lb 12.8 oz (80.2 kg)   SpO2 98%   BMI 34.53 kg/m²     Physical Exam  Vitals and nursing note reviewed. Constitutional:       Appearance: Normal appearance. HENT:      Nose: No congestion or rhinorrhea.       Mouth/Throat:      Mouth: Mucous membranes are moist. Pharynx: Oropharynx is clear. Eyes:      General: No scleral icterus. Conjunctiva/sclera: Conjunctivae normal.   Cardiovascular:      Rate and Rhythm: Normal rate and regular rhythm. Heart sounds: Normal heart sounds. Pulmonary:      Effort: Pulmonary effort is normal.      Breath sounds: Normal breath sounds. Musculoskeletal:      Cervical back: Neck supple. Skin:     General: Skin is warm. Neurological:      Mental Status: She is alert and oriented to person, place, and time. Psychiatric:         Mood and Affect: Mood normal.       ASSESSMENT & Leonor Piper was seen today for coronary artery disease. Diagnoses and all orders for this visit:    Hypothyroidism, unspecified type  -     levothyroxine (SYNTHROID) 50 MCG tablet; Take 1 tablet by mouth daily  -     TSH; Future  TSH was 8.56 last month. Will start on Synthroid 50 mcg today and recheck in 6 weeks. Acute congestive heart failure, unspecified heart failure type [facility])  Patient will continue with Lasix amiodarone metoprolol and spironolactone. Primary hypertension  Patient's blood pressure is well controlled with the above listed medications. CAD in native artery  Patient has mild coronary artery disease not requiring intervention  Nonischemic cardiomyopathy [facility])  Patient will follow-up with cardiology. She is reminded that if she is going to be started on expensive medications to ask for patient assistance from those pharmaceutical companies and she should build to get her medication. Return in about 4 weeks (around 10/13/2022). Electronically signed by Alisa Robles.  Iveth Yo DO on 9/18/2022

## 2022-09-16 ENCOUNTER — TELEPHONE (OUTPATIENT)
Dept: OTHER | Age: 62
End: 2022-09-16

## 2022-09-16 ENCOUNTER — HOSPITAL ENCOUNTER (OUTPATIENT)
Dept: OTHER | Age: 62
Setting detail: THERAPIES SERIES
Discharge: HOME | End: 2022-09-16

## 2022-09-16 VITALS
HEART RATE: 71 BPM | OXYGEN SATURATION: 99 % | BODY MASS INDEX: 34.76 KG/M2 | WEIGHT: 178 LBS | SYSTOLIC BLOOD PRESSURE: 138 MMHG | DIASTOLIC BLOOD PRESSURE: 86 MMHG | RESPIRATION RATE: 16 BRPM

## 2022-09-16 DIAGNOSIS — I50.9 CONGESTIVE HEART FAILURE, UNSPECIFIED HF CHRONICITY, UNSPECIFIED HEART FAILURE TYPE (HCC): Primary | ICD-10-CM

## 2022-09-16 DIAGNOSIS — Z59.89 UNINSURED: ICD-10-CM

## 2022-09-16 DIAGNOSIS — I50.20 HFREF (HEART FAILURE WITH REDUCED EJECTION FRACTION) (HCC): ICD-10-CM

## 2022-09-16 DIAGNOSIS — Z59.9 FINANCIAL PROBLEMS: ICD-10-CM

## 2022-09-16 DIAGNOSIS — I42.8 NONISCHEMIC CARDIOMYOPATHY (HCC): ICD-10-CM

## 2022-09-16 DIAGNOSIS — Z59.6 PATIENT CANNOT AFFORD MEDICATIONS: ICD-10-CM

## 2022-09-16 LAB
ANION GAP SERPL CALCULATED.3IONS-SCNC: 11 MMOL/L (ref 7–16)
BUN BLDV-MCNC: 24 MG/DL (ref 6–23)
CALCIUM SERPL-MCNC: 9.1 MG/DL (ref 8.6–10.2)
CHLORIDE BLD-SCNC: 100 MMOL/L (ref 98–107)
CO2: 25 MMOL/L (ref 22–29)
CREAT SERPL-MCNC: 1 MG/DL (ref 0.5–1)
GFR AFRICAN AMERICAN: >60
GFR NON-AFRICAN AMERICAN: >60 ML/MIN/1.73
GLUCOSE BLD-MCNC: 133 MG/DL (ref 74–99)
POTASSIUM SERPL-SCNC: 4.4 MMOL/L (ref 3.5–5)
PRO-BNP: 1807 PG/ML (ref 0–125)
SODIUM BLD-SCNC: 136 MMOL/L (ref 132–146)

## 2022-09-16 PROCEDURE — 36415 COLL VENOUS BLD VENIPUNCTURE: CPT

## 2022-09-16 PROCEDURE — 83880 ASSAY OF NATRIURETIC PEPTIDE: CPT

## 2022-09-16 PROCEDURE — 99214 OFFICE O/P EST MOD 30 MIN: CPT

## 2022-09-16 PROCEDURE — 80048 BASIC METABOLIC PNL TOTAL CA: CPT

## 2022-09-16 SDOH — ECONOMIC STABILITY - INCOME SECURITY: OTHER PROBLEMS RELATED TO HOUSING AND ECONOMIC CIRCUMSTANCES: Z59.89

## 2022-09-16 SDOH — ECONOMIC STABILITY - INCOME SECURITY: PROBLEM RELATED TO HOUSING AND ECONOMIC CIRCUMSTANCES, UNSPECIFIED: Z59.9

## 2022-09-16 SDOH — ECONOMIC STABILITY - INCOME SECURITY: LOW INCOME: Z59.6

## 2022-09-16 NOTE — PROGRESS NOTES
9/16/2022 9:34 AM Per JAMIR Aceves CNP  verbal instructions to me. escribed low dose entresto. Need  for GDMT. Called Laura Pimentel [phone] (home)   says she has no rx coverage. Can't afford entresto. Will coordinate samples at [facility] cardiology and initiate [facility] assistance  Will send referral to [place] [id number]. to review social determinants needs. Keeping chf clinic visit today.  driving.  And will  samples Entresto also    Future Appointments   Date Time Provider Arcadio Phipps   9/16/2022 10:30 AM [facility] CHF ROOM 1 [facility] CHF [facility] Yeison Whyteilla   9/19/2022  8:00 AM [facility]   10/6/2022 12:30 PM Mike Ortega DO [facility] cardiology [phone] 9:36 AM Katerina Shelley will pull and document Entersto samples    Called [facility] rx assistance 9:38 AM left VM with patient demographics and need for entrsto rx assist    Order placed for CHW    Kennith Najjar, RN

## 2022-09-16 NOTE — PROGRESS NOTES
Congestive Heart Failure [place] [address] [place]     TITRATION PATIENT     Yared Duran   1960    Referring Provider: Dr. Biju Stewart   Primary Care Physician: Dr. Jeremias Figueroa DO  Cardiologist: DR. Biju Stewart   Nephrologist: N/A    History of Present Illness:   Yared Duran is a 57 y.o. female with a history of HFrEF, most recent EF 10% 8/27/2022. Currently wearing a lifevest. Denies any alarms or alerts. Patient Story:  She does not have dyspnea with exertion, shortness of breath, or decline in overall functional capacity. She does not have orthopnea, PND, nocturnal cough or hemoptysis. She does not have abdominal distention or bloating, early satiety, anorexia/change in appetite. She does has a good urinary response to oral diuretic. She has trace lower extremity edema. She denies lightheadedness, dizziness. She denies palpitations, syncope or near syncope. She does not complain of chest pain, pressure, discomfort. No Known Allergies    Current Outpatient Medications on File Prior to Encounter   Medication Sig Dispense Refill    levothyroxine (SYNTHROID) 50 MCG tablet Take 1 tablet by mouth daily 30 tablet 5    amiodarone (CORDARONE) 200 MG tablet Take 1 tablet by mouth 2 times daily for 60 doses 60 tablet 0    apixaban (ELIQUIS) 5 MG TABS tablet Take 1 tablet by mouth 2 times daily 180 tablet 2    furosemide (LASIX) 20 MG tablet Take 1 tablet by mouth daily 90 tablet 2    metoprolol succinate (TOPROL XL) 25 MG extended release tablet Take 1 tablet by mouth daily 90 tablet 3    spironolactone (ALDACTONE) 25 MG tablet Take 1 tablet by mouth daily 90 tablet 3    [DISCONTINUED] amLODIPine (NORVASC) 10 MG tablet Take 20 mg by mouth daily      [DISCONTINUED] simvastatin (ZOCOR) 20 MG tablet Take 20 mg by mouth nightly       No current facility-administered medications on file prior to encounter.      Guideline directed medical:  ARNI/ACE I/ARB: entresto samples to be picked up after today's appt  Beta blocker:  Yes metoprolol succinate 25 mg daily   Aldosterone antagonist:  Yes spirolactone 25 mg daily   SGLT2i:  No    Physical Examination:     /86   Pulse 71   Resp 16   Wt 178 lb (80.7 kg)   SpO2 99%   BMI 34.76 kg/m²     Assessment  Charting Type: Shift assessment (CHF clinic)    Neurological  Level of Consciousness: Alert (0)    Respiratory  Respiratory Pattern: Regular  Respiratory Depth: Normal  Respiratory Quality/Effort: Unlabored  Chest Assessment: Chest expansion symmetrical  L Breath Sounds: Clear  R Breath Sounds: Clear    Cough/Sputum  Cough: None    Cardiac  Cardiac Regularity: Regular  Heart Sounds: S1, S2  Cardiac Rhythm: Sinus rhythm    Rhythm Interpretation  Heart Rate: 71    Gastrointestinal  Abdominal (WDL): Within Defined Limits  Abdomen Inspection: Soft  RUQ Bowel Sounds: Active  LUQ Bowel Sounds: Active  RLQ Bowel Sounds: Active  LLQ Bowel Sounds: Active     Bowel Sounds  RUQ Bowel Sounds: Active  LUQ Bowel Sounds: Active  RLQ Bowel Sounds: Active  LLQ Bowel Sounds:  Active    Peripheral Vascular  Peripheral Vascular (WDL): Exceptions to WDL  Edema: Right lower extremity, Left lower extremity  RLE Edema: Trace  LLE Edema: Trace    Genitourinary  Genitourinary (WDL): Within Defined Limits (has a good urinary response with oral diuretics.)    Heart Rate: 71      LAB DATA:    Last 3 BMP      Sodium (mmol/L)   Date Value   09/16/2022 136   09/09/2022 135   08/30/2022 136     Potassium (mmol/L)   Date Value   09/16/2022 4.4   09/09/2022 4.6   08/30/2022 3.8     Potassium reflex Magnesium (mmol/L)   Date Value   08/29/2022 3.1 (L)   08/27/2022 3.9   08/26/2022 4.0     Chloride (mmol/L)   Date Value   09/16/2022 100   09/09/2022 103   08/30/2022 100     CO2 (mmol/L)   Date Value   09/16/2022 25   09/09/2022 23   08/30/2022 28     BUN (mg/dL)   Date Value   09/16/2022 24 (H)   09/09/2022 29 (H)   08/30/2022 10     Glucose (mg/dL)   Date Value Future Appointments   Date Time Provider Arcadio Phipps   9/19/2022  8:00 AM CHELSEA Schroeder CNP CARDIO [facility]   9/28/2022  8:30 AM [facility] CHF ROOM 1 [facility]   10/6/2022 12:30 PM DO ALDEN Lyon [facility]     Given clinic phone number and aware of signs and symptoms to call with any HF change in symptoms.

## 2022-09-16 NOTE — PLAN OF CARE
Problem: Discharge Planning  Goal: Discharge to home or other facility with appropriate resources  Outcome: Progressing     Problem: Discharge Planning  Goal: Discharge to home or other facility with appropriate resources  Outcome: Progressing

## 2022-09-16 NOTE — TELEPHONE ENCOUNTER
CHF CHW Clinic Visit- Initial  9/16/2022  10:45 AM    CHW received referral from Ivy Morris RN. Patient need: Insurance, PCP, prescription assistance. Notes: CHW provided patient with prescription assistance and insurance resource(s) (OSHIPP general pamphlet, O[employer]IIP Medicare Part D, [employer] Medicare Savings Program, [place] Application for Health Coverage and Help Paying for Costs, Prescription Assistance flyer). CHW explained each resource to the patient and how the resource(s) can be utilized. Patient stated she will look at the resources when she goes home. Patient stated the following:   -Having an appointment scheduled to establish with PCP. -Being connected to prescription assistance. -Uninsured and interested in insurance coverage depending on the cost.    Follow up plan: Ask if patient has completed [place] Application for Health Coverage and Help Paying for Costs  Next anticipated outreach: 1 week        CHW informed patient of the services and resources that can be provided to them. CHW instructed patient to call CHF CHW at [phone] for any future assistance.      RUTH Giordano  Congestive Heart Failure [employer]

## 2022-09-18 PROBLEM — E03.9 HYPOTHYROIDISM: Status: ACTIVE | Noted: 2022-09-18

## 2022-09-18 ASSESSMENT — ENCOUNTER SYMPTOMS
GASTROINTESTINAL NEGATIVE: 1
EYE DISCHARGE: 0
EYE REDNESS: 0
SHORTNESS OF BREATH: 0
COUGH: 0
RHINORRHEA: 0
PHOTOPHOBIA: 0
SINUS PAIN: 0

## 2022-09-19 ENCOUNTER — OFFICE VISIT (OUTPATIENT)
Dept: CARDIOLOGY CLINIC | Age: 62
End: 2022-09-19

## 2022-09-19 VITALS
WEIGHT: 181.2 LBS | OXYGEN SATURATION: 98 % | BODY MASS INDEX: 35.57 KG/M2 | RESPIRATION RATE: 20 BRPM | HEIGHT: 60 IN | SYSTOLIC BLOOD PRESSURE: 116 MMHG | DIASTOLIC BLOOD PRESSURE: 80 MMHG | HEART RATE: 69 BPM

## 2022-09-19 DIAGNOSIS — I50.20 HFREF (HEART FAILURE WITH REDUCED EJECTION FRACTION) (HCC): ICD-10-CM

## 2022-09-19 DIAGNOSIS — E66.09 CLASS 2 OBESITY DUE TO EXCESS CALORIES WITHOUT SERIOUS COMORBIDITY WITH BODY MASS INDEX (BMI) OF 35.0 TO 35.9 IN ADULT: ICD-10-CM

## 2022-09-19 DIAGNOSIS — E03.9 HYPOTHYROIDISM, UNSPECIFIED TYPE: ICD-10-CM

## 2022-09-19 DIAGNOSIS — Z59.89 DOES NOT HAVE HEALTH INSURANCE: Primary | ICD-10-CM

## 2022-09-19 DIAGNOSIS — I42.8 NONISCHEMIC CARDIOMYOPATHY (HCC): Primary | ICD-10-CM

## 2022-09-19 DIAGNOSIS — I25.10 CAD IN NATIVE ARTERY: ICD-10-CM

## 2022-09-19 DIAGNOSIS — I51.3 LV (LEFT VENTRICULAR) MURAL THROMBUS: ICD-10-CM

## 2022-09-19 DIAGNOSIS — E61.1 IRON DEFICIENCY: ICD-10-CM

## 2022-09-19 PROBLEM — E66.812 CLASS 2 OBESITY DUE TO EXCESS CALORIES WITHOUT SERIOUS COMORBIDITY WITH BODY MASS INDEX (BMI) OF 35.0 TO 35.9 IN ADULT: Status: ACTIVE | Noted: 2022-09-19

## 2022-09-19 PROCEDURE — 93000 ELECTROCARDIOGRAM COMPLETE: CPT | Performed by: INTERNAL MEDICINE

## 2022-09-19 PROCEDURE — 99214 OFFICE O/P EST MOD 30 MIN: CPT | Performed by: NURSE PRACTITIONER

## 2022-09-19 SDOH — ECONOMIC STABILITY - INCOME SECURITY: OTHER PROBLEMS RELATED TO HOUSING AND ECONOMIC CIRCUMSTANCES: Z59.89

## 2022-09-19 NOTE — PROGRESS NOTES
[id number] [facility]iology  Office Visit         Reason for Visit: Heart failure    Primary Cardiologist: Dr. Berkley Morrow         History of Present Illness:     Ms. Annalee Nash is a 57year old female with a PMHx of recently diagnosed nonischemic cardiomyopathy, chronic HFrEF, mild nonobstructive CAD of the RCA, LV apical thrombus, NSVT, HTN TN, HLD, hypothyroidism, obesity and prior tobacco abuse. She presented to the emergency room on 8/26/2022 with complaints of chest pain, increased shortness of breath, weight gain, lower extremity edema and orthopnea. She was admitted to the hospital for acute heart failure/NSTEMI. She was IV diuresed and during hospitalization underwent TTE that demonstrated LVEF 10%, stage II DD, preserved RV size, moderate function, moderate TR, mild-moderate MR, trace AI and LV thrombus. She was initiated on GDMT and once improved from a fluid standpoint underwent LHC that demonstrated mild nonobstructive CAD of the RCA. During procedure she had noted NSVT in the setting of hypokalemia, hypomagnesia and was initiated on tapering amiodarone and fitted with LifeVest.    She presents today in follow-up, since discharge from the hospital she has been following with the CHF clinic, it was noted that she was not continued on Entresto or Jardiance upon discharge due to lack of insurance, she was referred to our CHF CHW who is meeting with the patient to facilitate insurance coverage. In the meantime she was provided samples of Entresto last week and started on the medication. She has been tolerating this well. She has yet to restart Jardiance. Otherwise she has been compliant with all generic cardiac medications. She denies dyspnea with exertion, shortness of breath, or decline in overall functional capacity. She denies orthopnea, PND, nocturnal cough or hemoptysis. She denies abdominal distention or bloating, early satiety, anorexia/change in appetite, unintentional weight loss.  She does not lower extremity edema. She denies exertional lightheadedness. She denies palpitations, syncope or near syncope. Review of systems is negative for chest pain, pressure, discomfort. When ambulating on an incline, She does not leg claudication. History is negative for neurological symptoms including transient loss of vision, asymmetric weakness, aphasia, dysphasia, numbness, tingling.      Patient Active Problem List    Diagnosis Date Noted    Hypothyroidism 09/18/2022     Priority: Medium    CAD in native artery 08/29/2022     Priority: Medium    Nonischemic cardiomyopathy ([facility] [address] 08/29/2022     Priority: Medium    NSTEMI (non-ST elevated myocardial infarction) ([facility] [address] 08/26/2022     Priority: Medium           Past Medical History:   Diagnosis Date    Hyperlipidemia     Hypertension     Hypothyroidism 9/18/2022         Past Surgical History:   Procedure Laterality Date    ANKLE SURGERY Right     2012 & 2020         No Known Allergies      Outpatient Medications Marked as Taking for the 9/19/22 encounter (Office Visit) with CHELSEA Leonard CNP   Medication Sig Dispense Refill    empagliflozin (JARDIANCE) 10 MG tablet Take 1 tablet by mouth daily 30 tablet 5    sacubitril-valsartan (ENTRESTO) 24-26 MG per tablet Take 1 tablet by mouth 2 times daily 60 tablet 3    levothyroxine (SYNTHROID) 50 MCG tablet Take 1 tablet by mouth daily 30 tablet 5    amiodarone (CORDARONE) 200 MG tablet Take 1 tablet by mouth 2 times daily for 60 doses 60 tablet 0    apixaban (ELIQUIS) 5 MG TABS tablet Take 1 tablet by mouth 2 times daily 180 tablet 2    furosemide (LASIX) 20 MG tablet Take 1 tablet by mouth daily 90 tablet 2    metoprolol succinate (TOPROL XL) 25 MG extended release tablet Take 1 tablet by mouth daily 90 tablet 3    spironolactone (ALDACTONE) 25 MG tablet Take 1 tablet by mouth daily 90 tablet 3           Guideline directed medical/device therapy:  ARNI/ACE I/ARB: No  Beta blocker:  No  Aldosterone antagonist: No  SGLT2i:  ICD/CRT-P/-D:   LiveVest  QRS interval on recent ECG (personally reviewed/interpreted): <120 ms  Percentage RV pacing (personally reviewed/interpreted): %/NA        Review of Systems:   Cardiac: As per HPI  General: No fever, chills, rigors  Pulmonary: As per HPI  HEENT: No visual disturbances, difficult swallowing  GI: No nausea, vomiting, abdominal pain  : No dysuria or hematuria  Endocrine: No thyroid disease or diabetes  Musculoskeletal: MAGUIRE x 4, no focal motor deficits  Skin: Intact, no rashes  Neuro/Psych: No headache or seizures          Weights: Wt Readings from Last 3 Encounters:   09/19/22 181 lb 3.2 oz (82.2 kg)   09/16/22 178 lb (80.7 kg)   09/15/22 176 lb 12.8 oz (80.2 kg)             Physical Examination:     /80 (Site: Left Upper Arm, Position: Sitting, Cuff Size: Medium Adult)   Pulse 69   Resp 20   Ht 5' (1.524 m)   Wt 181 lb 3.2 oz (82.2 kg)   SpO2 98%   BMI 35.39 kg/m²     CONSTITUTIONAL: Alert and oriented times 3, no acute distress and cooperative to examination with proper mood and affect. SKIN: Skin color, texture, turgor normal. No rashes or lesions. LYMPH: no cervical nodes, no inguinal nodes  HEENT: Head is normocephalic, atraumatic. EOMI, PERRLA. NECK: Supple, symmetrical, trachea midline, no adenopathy, thyroid symmetric, not enlarged and no tenderness, skin normal.  CHEST/LUNGS: chest symmetric with normal A/P diameter, normal respiratory rate and rhythm, lungs clear to auscultation without wheezes, rales or rhonchi. No accessory muscle use. Scars None   CARDIOVASCULAR: Heart sounds are normal.  Regular rate and rhythm without murmur, gallop or rub. Normal S1 and S2. . Carotid and femoral pulses 2+/4 and equal bilaterally. ABDOMEN: Normal shape. No and Laparoscopic scar(s) present. Normal bowel sounds. No bruits. soft, nondistended, no masses or organomegaly. no evidence of hernia. Percussion: Normal without hepatosplenomegally.  Tenderness: absent. RECTAL: deferred, not clinically indicated  NEUROLOGIC: There are no focalizing motor or sensory deficits. CN II-XII are grossly intact. Barnetta Garcia EXTREMITIES: no cyanosis, no clubbing, and no edema. All the following diagnostics were personally reviewed and interpreted by me. LAB DATA:     8/30/22 05:30 9/9/22 11:30 9/16/22 10:55   Sodium 136 135 136   Potassium 3.8 4.6 4.4   Chloride 100 103 100   CO2 28 23 25   BUN,BUNPL 10 29 (H) 24 (H)   Creatinine 0.9 1.1 (H) 1.0   Anion Gap 8 9 11   GFR Non- >60 >60 >60   GFR  >60 >60 >60   Magnesium 1.9     Glucose, Random 112 (H) 120 (H) 133 (H)   CALCIUM, SERUM, 052007 8.3 (L) 9.0 9.1   Pro-BNP  1,964 (H) 1,807 (H)   TSH 8.560 (H)     T4 Free 0.97     WBC 5.7     RBC 5.00     Hemoglobin Quant 14.1     Hematocrit 42.0     MCV 84.0     MCH 28.2     MCHC 33.6     MPV 11.4     RDW 16.0 (H)     Platelet Count 186         IMAGING:    CTA Pulmonary (8/26/2022)  Impression  No evidence of pulmonary embolism or acute pulmonary abnormality. There is mild pulmonary edema and a mild right basilar pleural effusion. CXR (8/26/2022)  FINDINGS:  Enlarged cardiac silhouette. Interstitial opacities throughout both lungs. More confluent opacities are present in the lung bases. No obvious pleural  effusion. No pneumothorax. Impression  1. Enlarged cardiac silhouette related to cardiomegaly or pericardial  effusion. 2. Pulmonary vascular congestion. CARDIAC TESTING:    TTE (8/27/2022)   Summary   Mildly dilated left ventricular chamber size. Severe global hypokinesis with regional variations in wall motion. There   is apical akinesis. Visually estimated LVEF is 10%. Grade II diastolic dysfunction. Elevated LA pressure. Normal right ventricle structure and moderate function. The left atrium is moderately dilated. Normal right atrial size   Mild-moderate mitral regurgitation is present.    Moderate tricuspid regurgitation. Moderate-severe pulmonary hypertension with a PASP of 64 mm Hg. Elevated RA pressure. There is a fixed echodensity measuring 2 x 1.8 cms at the LV apex. This is   consistent with an LV thrombus. There is another fixed, smaller   echodensity, also at the apex consistent with a thrombus. No comparison study available. [facility] (8/29/2022)  ANGIOGRAPHIC FINDINGS:    The left main coronary artery arises normally from the left sinus of Valsalva. It is relatively short vessel, mid-sized vessel without any disease. It trifurcates into left anterior descending artery and left circumflex artery. The left anterior descending artery extends down to the apex and mildly tortuous. It gives off good size diagonal branch that has no CAD. The ramus intermedius artery is a mid size vessel, tortuous without any disease. The left circumflex artery is large vessel, gives off two small OM branches and very large left posterolateral branch. The left circumflex artery and its branches have no CAD. The right coronary artery arises normally from the right sinus of Valsalva. It is tortuous vessel, dominant circulation with 30% disease in the mid segment. IMPRESSION:  1. Mild disease involving mid RCA. Otherwise, angiographically normal coronary arteries. 2.  Nonischemic cardiomyopathy. EKG  Sinus Rhythm   LAE  LVH  Diffuse nonspecific T-abnormality. ASSESSMENT:  Chronic HFrEF  ACC stage C / NYHA class II  Euvolemic   Nonischemic cardiomyopathy, unknown etiology (will check viral panels. SPEP,UPEP, immunofixation given elevated LVMI / HTN)   LVEF 10%, LVEDD 5.9, LVMI 149  Mild nonobstructive CAD of RCA   LV apical thrombus - on Eliquis  NSVT - on tapering amiodarone   HTN  HLD  Hypothyroidism - on HRT  Obesity  Plausible KAMRAN  Prior tobacco abuse      PLAN:  Start Jardiance 10 mg daily     2. Go to prescription assistance to help with coverage of your medication Elvin Brooks, Jardiance and Eliquis).  Also she was given samples of all 3 medications in office today. 3. Follow up with CHF clinic as scheduled - will check viral and iron studies     4. Referral to cardiac rehab     5. Eventual sleep study ( once you have insurance)    6. Follow up with Dr. Biju Stewart in 1-2 months     7. Weigh yourself daily    -Stay Hydrated, 64 oz     -Diet should sodium restricted to 2 grams    -Again watch your daily weight trends and if you gain water weight please follow below instructions.    -If you gain 3-5 pounds in 2-3 days OR notice that you are retaining fluid in anyway just like you did before then take an extra dose of your water pill (furosemide/Lasix) every day until you lose the weight or feel better.     -If you notice that you have taken more than 2 extra doses in 1 week then please call and let us know. -If at any time you feel that you are retaining fluid, your medications are not working, or you feel ill in anyway, then please call us for either same day appointment or the next day, and for instructions. Our goal is to keep you out of the emergency room and the hospital and we have ways to do it. You just need to call us in a timely manner.     -If you become sick for other reasons, and notice that you are not urinating as much, the urine is very dark, you have significant diarrhea or vomiting, then please DO NOT take your water pill and CALL US immediately.      Tanika TRAN-CNP  [facility]

## 2022-09-19 NOTE — PATIENT INSTRUCTIONS
Start Jardiance 10 mg daily     2. Go to prescription assistance to help with coverage of your medication ( Entresto, Jardiance and Eliquis)    3. Follow up with CHF clinic as scheduled - will check viral and iron studies     4. Referral to cardiac rehab     5. Eventual sleep study ( once you have insurance)    6. Follow up with Dr. Dhiraj Alfonso in 1-2 months     7. Weigh yourself daily    -Stay Hydrated, 64 oz     -Diet should sodium restricted to 2 grams    -Again watch your daily weight trends and if you gain water weight please follow below instructions.    -If you gain 3-5 pounds in 2-3 days OR notice that you are retaining fluid in anyway just like you did before then take an extra dose of your water pill (furosemide/Lasix) every day until you lose the weight or feel better.     -If you notice that you have taken more than 2 extra doses in 1 week then please call and let us know. -If at any time you feel that you are retaining fluid, your medications are not working, or you feel ill in anyway, then please call us for either same day appointment or the next day, and for instructions. Our goal is to keep you out of the emergency room and the hospital and we have ways to do it. You just need to call us in a timely manner.     -If you become sick for other reasons, and notice that you are not urinating as much, the urine is very dark, you have significant diarrhea or vomiting, then please DO NOT take your water pill and CALL US immediately.

## 2022-09-19 NOTE — TELEPHONE ENCOUNTER
CHF CHW Follow up Call  9/19/2022  1:26 PM     Notes: Followed up with patient regarding resources given during CHF clinic appointment. Patient stated they did not review the information yet, but will call if they need anything in the future. Patient stated there is nothing the CHW can assist with. CHW services are complete at this time. CHW instructed patient to call CHF CHW at [phone] for further assistance.     RUTH Wilkins  [facility]

## 2022-09-19 NOTE — Clinical Note
Ordered viral and iron studies while in office, please have these drawn when she comes to clinic next week. She will have to go to lab to get them done. Also I put in order for cardiac rehab - given that she does not have insurance she may need to go under [facility].   Thanks!

## 2022-09-19 NOTE — PROGRESS NOTES
Samples given:     Entresto 24/26mg   lot # [id number]   Exp 12/2024  2 bottles each with quantity 28    Eliquis 5mg   Lot # [id number]   Exp   4 boxes each with quantity 14     Jardiance 10 mg  Lot# [id number]  Exp 06/2024  2 boxes each with quantity 14    Jardiance 10 mg   Lot # [id number]  exp 05/2024  2 boxes each with quantity 14

## 2022-09-20 ENCOUNTER — TELEPHONE (OUTPATIENT)
Dept: CARDIOLOGY CLINIC | Age: 62
End: 2022-09-20

## 2022-09-20 NOTE — TELEPHONE ENCOUNTER
----- Message from CHELSEA Andino CNP sent at 9/19/2022  8:47 AM EDT -----  Ordered viral and iron studies while in office, please have these drawn when she comes to clinic next week. She will have to go to lab to get them done. Also I put in order for cardiac rehab - given that she does not have insurance she may need to go under [facility].     Thanks!

## 2022-09-28 ENCOUNTER — HOSPITAL ENCOUNTER (OUTPATIENT)
Dept: OTHER | Age: 62
Setting detail: THERAPIES SERIES
Discharge: HOME | End: 2022-09-28

## 2022-09-28 ENCOUNTER — HOSPITAL ENCOUNTER (OUTPATIENT)
Age: 62
Discharge: HOME | End: 2022-09-28

## 2022-09-28 VITALS
DIASTOLIC BLOOD PRESSURE: 60 MMHG | HEART RATE: 63 BPM | OXYGEN SATURATION: 100 % | WEIGHT: 180 LBS | BODY MASS INDEX: 35.15 KG/M2 | SYSTOLIC BLOOD PRESSURE: 127 MMHG | RESPIRATION RATE: 16 BRPM

## 2022-09-28 DIAGNOSIS — I42.8 NONISCHEMIC CARDIOMYOPATHY (HCC): ICD-10-CM

## 2022-09-28 DIAGNOSIS — E61.1 IRON DEFICIENCY: ICD-10-CM

## 2022-09-28 LAB
ANION GAP SERPL CALCULATED.3IONS-SCNC: 8 MMOL/L (ref 7–16)
BUN BLDV-MCNC: 35 MG/DL (ref 6–23)
CALCIUM SERPL-MCNC: 9.4 MG/DL (ref 8.6–10.2)
CHLORIDE BLD-SCNC: 100 MMOL/L (ref 98–107)
CO2: 22 MMOL/L (ref 22–29)
CREAT SERPL-MCNC: 1.2 MG/DL (ref 0.5–1)
FERRITIN: 44 NG/ML
GFR AFRICAN AMERICAN: 55
GFR NON-AFRICAN AMERICAN: 55 ML/MIN/1.73
GLUCOSE BLD-MCNC: 118 MG/DL (ref 74–99)
IRON SATURATION: 11 % (ref 15–50)
IRON: 44 MCG/DL (ref 37–145)
POTASSIUM SERPL-SCNC: 4.8 MMOL/L (ref 3.5–5)
PRO-BNP: 736 PG/ML (ref 0–125)
SODIUM BLD-SCNC: 130 MMOL/L (ref 132–146)
TOTAL IRON BINDING CAPACITY: 393 MCG/DL (ref 250–450)
TRANSFERRIN: 355 MG/DL (ref 200–360)

## 2022-09-28 PROCEDURE — 86334 IMMUNOFIX E-PHORESIS SERUM: CPT

## 2022-09-28 PROCEDURE — 86645 CMV ANTIBODY IGM: CPT

## 2022-09-28 PROCEDURE — 86665 EPSTEIN-BARR CAPSID VCA: CPT

## 2022-09-28 PROCEDURE — 84166 PROTEIN E-PHORESIS/URINE/CSF: CPT

## 2022-09-28 PROCEDURE — 83550 IRON BINDING TEST: CPT

## 2022-09-28 PROCEDURE — 84165 PROTEIN E-PHORESIS SERUM: CPT

## 2022-09-28 PROCEDURE — 86658 ENTEROVIRUS ANTIBODY: CPT

## 2022-09-28 PROCEDURE — 36415 COLL VENOUS BLD VENIPUNCTURE: CPT

## 2022-09-28 PROCEDURE — 84466 ASSAY OF TRANSFERRIN: CPT

## 2022-09-28 PROCEDURE — 99214 OFFICE O/P EST MOD 30 MIN: CPT

## 2022-09-28 PROCEDURE — 83540 ASSAY OF IRON: CPT

## 2022-09-28 PROCEDURE — 82728 ASSAY OF FERRITIN: CPT

## 2022-09-28 PROCEDURE — 82784 ASSAY IGA/IGD/IGG/IGM EACH: CPT

## 2022-09-28 PROCEDURE — 80048 BASIC METABOLIC PNL TOTAL CA: CPT

## 2022-09-28 PROCEDURE — 86747 PARVOVIRUS ANTIBODY: CPT

## 2022-09-28 PROCEDURE — 83880 ASSAY OF NATRIURETIC PEPTIDE: CPT

## 2022-09-28 PROCEDURE — 86644 CMV ANTIBODY: CPT

## 2022-09-28 NOTE — PROGRESS NOTES
Congestive Heart Failure [facility]     TITRATION PATIENT     Ghulam Murray   1960    Referring Provider: Dr. Noemi iRos   Primary Care Physician: Dr. Akilah Brand DO  Cardiologist: Dr. Noemi Rios / Joanne TRAN- CNP  Nephrologist: N/A    History of Present Illness:   Ghulam Murray is a 57 y.o. female with a history of HFrEF, most recent EF 10% 8/27/2022. Currently wearing a lifevest. Denies any alarms or alerts. Patient Story:  She does not have dyspnea with exertion, shortness of breath, or decline in overall functional capacity. She does not have orthopnea, PND, nocturnal cough or hemoptysis. She does not have abdominal distention or bloating, early satiety, anorexia/change in appetite. She does has a good urinary response to oral diuretic. She does not have lower extremity edema. She denies lightheadedness, dizziness. She denies palpitations, syncope or near syncope. She does not complain of chest pain, pressure, discomfort.        No Known Allergies    Current Outpatient Medications on File Prior to Encounter   Medication Sig Dispense Refill    empagliflozin (JARDIANCE) 10 MG tablet Take 1 tablet by mouth daily 30 tablet 5    sacubitril-valsartan (ENTRESTO) 24-26 MG per tablet Take 1 tablet by mouth 2 times daily 60 tablet 3    levothyroxine (SYNTHROID) 50 MCG tablet Take 1 tablet by mouth daily 30 tablet 5    amiodarone (CORDARONE) 200 MG tablet Take 1 tablet by mouth 2 times daily for 60 doses 60 tablet 0    apixaban (ELIQUIS) 5 MG TABS tablet Take 1 tablet by mouth 2 times daily 180 tablet 2    furosemide (LASIX) 20 MG tablet Take 1 tablet by mouth daily 90 tablet 2    metoprolol succinate (TOPROL XL) 25 MG extended release tablet Take 1 tablet by mouth daily 90 tablet 3    spironolactone (ALDACTONE) 25 MG tablet Take 1 tablet by mouth daily 90 tablet 3    [DISCONTINUED] amLODIPine (NORVASC) 10 MG tablet Take 20 mg by mouth daily [DISCONTINUED] simvastatin (ZOCOR) 20 MG tablet Take 20 mg by mouth nightly       No current facility-administered medications on file prior to encounter. Guideline directed medical:  ARNI/ACE I/ARB: Yes entresto 24/26 mg BIG  Beta blocker:  Yes metoprolol succinate 25 mg daily   Aldosterone antagonist:  Yes spirolactone 25 mg daily   SGLT2i:  No    Physical Examination:     /60   Pulse 63   Resp 16   Wt 180 lb (81.6 kg)   SpO2 100%   BMI 35.15 kg/m²     Assessment  Charting Type: Shift assessment (CHF clinic)    Neurological  Level of Consciousness: Alert (0)    Respiratory  Respiratory Pattern: Regular  Respiratory Depth: Normal  Respiratory Quality/Effort: Unlabored  Chest Assessment: Chest expansion symmetrical  L Breath Sounds: Clear  R Breath Sounds: Clear    Cough/Sputum  Cough: None    Cardiac  Cardiac Regularity: Regular  Heart Sounds: S1, S2  Cardiac Rhythm: Sinus rhythm    Rhythm Interpretation  Heart Rate: 63    Gastrointestinal  Abdominal (WDL): Within Defined Limits  Abdomen Inspection: Soft  RUQ Bowel Sounds: Active  LUQ Bowel Sounds: Active  RLQ Bowel Sounds: Active  LLQ Bowel Sounds: Active     Bowel Sounds  RUQ Bowel Sounds: Active  LUQ Bowel Sounds: Active  RLQ Bowel Sounds: Active  LLQ Bowel Sounds:  Active    Peripheral Vascular  Peripheral Vascular (WDL): Within Defined Limits  Edema: Right lower extremity, Left lower extremity  RLE Edema: None  LLE Edema: None    Genitourinary  Genitourinary (WDL): Within Defined Limits (has a good urinary response with oral diuretics.)    Heart Rate: 63      LAB DATA:    Last 3 BMP      Sodium (mmol/L)   Date Value   09/28/2022 130 (L)   09/16/2022 136   09/09/2022 135     Potassium (mmol/L)   Date Value   09/28/2022 4.8   09/16/2022 4.4   09/09/2022 4.6     Potassium reflex Magnesium (mmol/L)   Date Value   08/29/2022 3.1 (L)   08/27/2022 3.9   08/26/2022 4.0     Chloride (mmol/L)   Date Value   09/28/2022 100   09/16/2022 100   09/09/2022 103 CO2 (mmol/L)   Date Value   09/28/2022 22   09/16/2022 25   09/09/2022 23     BUN (mg/dL)   Date Value   09/28/2022 35 (H)   09/16/2022 24 (H)   09/09/2022 29 (H)     Glucose (mg/dL)   Date Value   09/28/2022 118 (H)   09/16/2022 133 (H)   09/09/2022 120 (H)     Calcium (mg/dL)   Date Value   09/28/2022 9.4   09/16/2022 9.1   09/09/2022 9.0       Last 3 BNP       Pro-BNP (pg/mL)   Date Value   09/28/2022 736 (H)   09/16/2022 1,807 (H)   09/09/2022 1,964 (H)          CBC: No results for input(s): WBC, HGB, PLT in the last 72 hours. BMP:    Recent Labs     09/28/22  0940   *   K 4.8      CO2 22   BUN 35*   CREATININE 1.2*   GLUCOSE 118*         Hepatic: No results for input(s): AST, ALT, ALB, BILITOT, ALKPHOS in the last 72 hours. Troponin: No results for input(s): TROPONINI in the last 72 hours. BNP: No results for input(s): BNP in the last 72 hours. Lipids: No results for input(s): CHOL, HDL in the last 72 hours. Invalid input(s): LDLCALCU  INR: No results for input(s): INR in the last 72 hours. WEIGHTS:    Wt Readings from Last 3 Encounters:   09/28/22 180 lb (81.6 kg)   09/19/22 181 lb 3.2 oz (82.2 kg)   09/16/22 178 lb (80.7 kg)     TELEMETRY:  Cardiac Regularity: Regular  Cardiac Rhythm/Interpretation: SR    ASSESSMENT:  Vivienne Black presents for CHF clinic follow up today. Patient started on Entresto - denies any dizziness or lightheadedness. Patient continues on metoprolol 25 mg daily, spironolactone 25 mg daily, and lasix 20 mg and reports a good urinary response. Patient continues to weigh herself daily. Currently with stable weights. Did re-iterate with pt and  signs and symptoms of when to take extra lasix: If you gain 3-5 pounds in 2-3 days OR notice that you are retaining fluid in anyway just like you did before then take an extra dose of your water pill (furosemide/Lasix) every day until you lose the weight or feel better. Follow up made for 2 weeks. Patient sees PCP next week. Interventions completed this visit:  IV diuretics given no  Lab work obtained yes, BNP BMP - Viral studies obtained from lab. Reviewed currently prescribed medications with patient, educated on importance of compliance and answered any questions regarding their medication  Educated on low sodium diet    PLAN:  Scheduled to follow up in CHF clinic on   Future Appointments   Date Time Provider Arcadio Phipps   10/6/2022 12:30 PM DO Kendall Ramos [facility]   10/10/2022 10:30 AM [facility] CHF ROOM 1 [facility]   10/26/2022 11:00 AM Herlinda Thorpe  [facility]     Given clinic phone number and aware of signs and symptoms to call with any HF change in symptoms.

## 2022-09-28 NOTE — DISCHARGE INSTR - LAB
If you gain 3-5 pounds in 2-3 days OR notice that you are retaining fluid in anyway just like you did before then take an extra dose of your water pill (furosemide/Lasix) every day until you lose the weight or feel better.      -If you notice that you have taken more than 2 extra doses in 1 week then please call and let us know. -If at any time you feel that you are retaining fluid, your medications are not working, or you feel ill in anyway, then please call us for either same day appointment or the next day, and for instructions. Our goal is to keep you out of the emergency room and the hospital and we have ways to do it. You just need to call us in a timely manner.      -If you become sick for other reasons, and notice that you are not urinating as much, the urine is very dark, you have significant diarrhea or vomiting, then please DO NOT take your water pill and CALL US immediately.

## 2022-09-29 LAB
CYTOMEGALOVIRUS IGG ANTIBODY: NORMAL
CYTOMEGALOVIRUS IGM ANTIBODY: NORMAL

## 2022-09-29 NOTE — RESULT ENCOUNTER NOTE
Please set up for iron infusions   Class IIb recommendation for intravenous iron replacement in patients with NYHA class II and III HF and iron deficiency (ferritin <100 ng/ml or 100-300 ng/ml if transferrin saturation <20%), to improve functional status and QoL.        Thank you

## 2022-09-30 ENCOUNTER — HOSPITAL ENCOUNTER (OUTPATIENT)
Age: 62
Setting detail: SPECIMEN
Discharge: HOME | End: 2022-09-30

## 2022-09-30 DIAGNOSIS — I42.8 NONISCHEMIC CARDIOMYOPATHY (HCC): ICD-10-CM

## 2022-09-30 LAB — EPSTEIN-BARR VCA IGM: <10 U/ML (ref 0–43.9)

## 2022-09-30 PROCEDURE — 84166 PROTEIN E-PHORESIS/URINE/CSF: CPT

## 2022-10-02 LAB
PARVOVIRUS B19 IGG ANTIBODY: 1.27 IV
PARVOVIRUS B19 IGM ANTIBODY: 0.41 IV

## 2022-10-03 LAB
ADDENDUM ELECTROPHORESIS URINE RANDOM: NORMAL
ADDENDUM ELECTROPHORESIS URINE RANDOM: NORMAL
ALBUMIN SERPL-MCNC: 3.6 G/DL (ref 3.5–4.7)
ALPHA-1-GLOBULIN: 0.3 G/DL (ref 0.2–0.4)
ALPHA-2-GLOBULIN: 0.9 G/DL (ref 0.5–1)
BETA GLOBULIN: 1.5 G/DL (ref 0.8–1.3)
ELECTROPHORESIS: ABNORMAL
GAMMA GLOBULIN: 2.9 G/DL (ref 0.7–1.6)
IGA: 65 MG/DL (ref 70–400)
IGG: 3176 MG/DL (ref 700–1600)
IGM: 126 MG/DL (ref 40–230)
IMMUNOFIXATION RESULT, SERUM: NORMAL
IMMUNOFIXATION URINE: NORMAL
TOTAL PROTEIN: 9.3 G/DL (ref 6.4–8.3)

## 2022-10-05 ENCOUNTER — TELEPHONE (OUTPATIENT)
Dept: OTHER | Age: 62
End: 2022-10-05

## 2022-10-05 DIAGNOSIS — I42.8 NONISCHEMIC CARDIOMYOPATHY (HCC): Primary | ICD-10-CM

## 2022-10-05 DIAGNOSIS — I50.20 HFREF (HEART FAILURE WITH REDUCED EJECTION FRACTION) (HCC): ICD-10-CM

## 2022-10-05 NOTE — TELEPHONE ENCOUNTER
Called patient to review SPEP/UPEP results  Left voicemail to return call, will try again at later date

## 2022-10-06 ENCOUNTER — OFFICE VISIT (OUTPATIENT)
Dept: FAMILY MEDICINE CLINIC | Age: 62
End: 2022-10-06

## 2022-10-06 ENCOUNTER — TELEPHONE (OUTPATIENT)
Dept: CARDIOLOGY CLINIC | Age: 62
End: 2022-10-06

## 2022-10-06 VITALS
HEART RATE: 57 BPM | HEIGHT: 60 IN | RESPIRATION RATE: 16 BRPM | OXYGEN SATURATION: 100 % | BODY MASS INDEX: 35.65 KG/M2 | TEMPERATURE: 97.7 F | SYSTOLIC BLOOD PRESSURE: 124 MMHG | DIASTOLIC BLOOD PRESSURE: 70 MMHG | WEIGHT: 181.6 LBS

## 2022-10-06 DIAGNOSIS — I42.8 NONISCHEMIC CARDIOMYOPATHY (HCC): ICD-10-CM

## 2022-10-06 DIAGNOSIS — Z12.31 ENCOUNTER FOR SCREENING MAMMOGRAM FOR MALIGNANT NEOPLASM OF BREAST: ICD-10-CM

## 2022-10-06 DIAGNOSIS — Z23 FLU VACCINE NEED: ICD-10-CM

## 2022-10-06 DIAGNOSIS — I10 PRIMARY HYPERTENSION: ICD-10-CM

## 2022-10-06 DIAGNOSIS — E03.9 HYPOTHYROIDISM, UNSPECIFIED TYPE: Primary | ICD-10-CM

## 2022-10-06 DIAGNOSIS — I25.10 CAD IN NATIVE ARTERY: ICD-10-CM

## 2022-10-06 PROCEDURE — 90471 IMMUNIZATION ADMIN: CPT | Performed by: FAMILY MEDICINE

## 2022-10-06 PROCEDURE — 90674 CCIIV4 VAC NO PRSV 0.5 ML IM: CPT | Performed by: FAMILY MEDICINE

## 2022-10-06 PROCEDURE — 99214 OFFICE O/P EST MOD 30 MIN: CPT | Performed by: FAMILY MEDICINE

## 2022-10-06 ASSESSMENT — ENCOUNTER SYMPTOMS
SHORTNESS OF BREATH: 0
GASTROINTESTINAL NEGATIVE: 1
COUGH: 0
SINUS PAIN: 0
EYE REDNESS: 0
RHINORRHEA: 0
PHOTOPHOBIA: 0
EYE DISCHARGE: 0

## 2022-10-06 NOTE — PROGRESS NOTES
10/15/2022    [address] [place] [address] [address]    Chief Complaint   Patient presents with    Hypothyroidism     Patient here for follow up. HPI  History was obtained from patient. Amaury Baldwin is a 57 y.o. female who presents today with the followin. Hypothyroidism, unspecified type    2. CAD in native artery    3. Primary hypertension    4. Nonischemic cardiomyopathy ([facility]    5. Encounter for screening mammogram for malignant neoplasm of breast    6. Flu vaccine need       Patient is here for follow-up of hypothyroidism hypertension and nonischemic cardiomyopathy. She is following with cardiology and is currently taking Eliquis that she is got from the pharmaceutical company. She was recently started on Jardiance and Entresto and is in the process of getting those also from the [employer] as she has no prescription coverage. REVIEW OF SYMPTOMS    Review of Systems   Constitutional:  Negative for chills, fatigue and fever. HENT:  Negative for congestion, mouth sores, postnasal drip, rhinorrhea and sinus pain. Eyes:  Negative for photophobia, discharge and redness. Respiratory:  Negative for cough and shortness of breath. Cardiovascular:  Negative for chest pain. Gastrointestinal: Negative. Genitourinary:  Negative for difficulty urinating, dysuria, hematuria and urgency. Neurological:  Negative for headaches. Psychiatric/Behavioral:  Negative for sleep disturbance.       PAST MEDICAL HISTORY  Past Medical History:   Diagnosis Date    Hyperlipidemia     Hypertension     Hypothyroidism 2022       FAMILY HISTORY  Family History   Problem Relation Age of Onset    Heart Failure Mother          at age 80 years, was a smoker    Atrial Fibrillation Mother     Heart Failure Father          at age 71, was a smoker    Diabetes Father     Heart Attack Sister          at age 48 years, smoked    Arthritis Sister         smoked    Diabetes type 2  Brother         smoked    Diabetes type 2  Brother         smoked    Obesity Brother     Diabetes Brother         did not smoke       SOCIAL HISTORY  Social History     Socioeconomic History    Marital status:      Spouse name: 2nd : Mr. 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 She had had her first cigarette at age 22 years, for 42 years she had averaged 1/4 PPD for 10.5 pack-years   Vaping Use    Vaping Use: Never used   Substance and Sexual Activity    Alcohol use: Yes     Comment: occasional beer    Drug use: Never   Social History Narrative    CODE STATUS: Full Code    HEALTH CARE PROXY: her , Mr. Martin Webster, [phone]    AMBULATES: independently    DOMICILED: has stairs in the home, uses the stairs, lives with her  and her brother, has no home pets        No caffiene     Social Determinants of Health     Financial Resource Strain: Low Risk     Difficulty of Paying Living Expenses: Not very hard   Food Insecurity: No Food Insecurity    Worried About Running Out of Food in the Last Year: Never true    [address] in the Last Year: Never true   Transportation Needs: No Transportation Needs    Lack of Transportation (Medical): No    Lack of Transportation (Non-Medical): No   Physical Activity: Sufficiently Active    Days of Exercise per Week: 7 days    Minutes of Exercise per Session: 30 min   Stress: No Stress Concern Present    Feeling of Stress : Not at all   Social Connections:  Moderately Isolated    Frequency of Communication with Friends and Family: More than three times a week    Frequency of Social Gatherings with Friends and Family: Three times a week    Attends [employer] Services: Never    Active Member of Clubs or Organizations: No    Attends Club or Organization Meetings: Never    Marital Status:    Intimate Partner Violence: Not At Risk    Fear of Current or Ex-Partner: No    Emotionally Abused: No    Physically Abused: No    Sexually Abused: No   Housing Stability: Unknown    Unable to Pay for Housing in the Last Year: No    Unstable Housing in the Last Year: No        SURGICAL HISTORY  Past Surgical History:   Procedure Laterality Date    ANKLE SURGERY Right     2012 & 2020                 CURRENT MEDICATIONS  Current Outpatient Medications   Medication Sig Dispense Refill    empagliflozin (JARDIANCE) 10 MG tablet Take 1 tablet by mouth daily 30 tablet 5    sacubitril-valsartan (ENTRESTO) 24-26 MG per tablet Take 1 tablet by mouth 2 times daily 60 tablet 3    levothyroxine (SYNTHROID) 50 MCG tablet Take 1 tablet by mouth daily 30 tablet 5    apixaban (ELIQUIS) 5 MG TABS tablet Take 1 tablet by mouth 2 times daily 180 tablet 2    furosemide (LASIX) 20 MG tablet Take 1 tablet by mouth daily 90 tablet 2    metoprolol succinate (TOPROL XL) 25 MG extended release tablet Take 1 tablet by mouth daily 90 tablet 3    spironolactone (ALDACTONE) 25 MG tablet Take 1 tablet by mouth daily 90 tablet 3     No current facility-administered medications for this visit. ALLERGIES  No Known Allergies    PHYSICAL EXAM    /70   Pulse 57   Temp 97.7 °F (36.5 °C)   Resp 16   Ht 5' (1.524 m)   Wt 181 lb 9.6 oz (82.4 kg)   SpO2 100%   BMI 35.47 kg/m²     Physical Exam  Vitals and nursing note reviewed. Constitutional:       Appearance: Normal appearance. HENT:      Nose: No congestion or rhinorrhea. Mouth/Throat:      Mouth: Mucous membranes are moist.      Pharynx: Oropharynx is clear.    Eyes:      Conjunctiva/sclera: Conjunctivae normal. Cardiovascular:      Rate and Rhythm: Normal rate and regular rhythm. Heart sounds: Normal heart sounds. Pulmonary:      Effort: Pulmonary effort is normal.      Breath sounds: Normal breath sounds. Musculoskeletal:      Cervical back: Neck supple. Skin:     General: Skin is warm. Neurological:      Mental Status: She is alert and oriented to person, place, and time. Psychiatric:         Mood and Affect: Mood normal.       ASSESSMENT & Donell Eaton was seen today for hypothyroidism. Diagnoses and all orders for this visit:    Hypothyroidism, unspecified type  Patient will continue with her current dose of Synthroid and a TSH will be checked next month. CAD in native artery  Patient has a nonobstructed lesion and a RCA. Primary hypertension  Patient's blood pressure is well controlled on her current medication including Entresto, Lasix and metoprolol and spironolactone. Nonischemic cardiomyopathy [facility]  Patient is on Entresto. She will be following up with cardiology. Encounter for screening mammogram for malignant neoplasm of breast  -     YOLANDE DIGITAL SCREEN W OR WO CAD BILATERAL; Future    Flu vaccine need  -     Influenza, FLUCELVAX, (age 10 mo+), IM, PF, 0.5 mL      Return in about 4 weeks (around 11/3/2022). Electronically signed by Burak Freeman.  Eric Diego DO on 10/15/2022

## 2022-10-06 NOTE — TELEPHONE ENCOUNTER
----- Message from CHELSEA Marques CNP sent at 9/29/2022 11:28 AM EDT -----  Please set up for iron infusions   Class IIb recommendation for intravenous iron replacement in patients with NYHA class II and III HF and iron deficiency (ferritin <100 ng/ml or 100-300 ng/ml if transferrin saturation <20%), to improve functional status and QoL.         Thank you

## 2022-10-10 ENCOUNTER — TELEPHONE (OUTPATIENT)
Dept: CARDIOLOGY CLINIC | Age: 62
End: 2022-10-10

## 2022-10-10 ENCOUNTER — HOSPITAL ENCOUNTER (OUTPATIENT)
Dept: OTHER | Age: 62
Setting detail: THERAPIES SERIES
Discharge: HOME | End: 2022-10-10

## 2022-10-10 DIAGNOSIS — R89.9 ABNORMAL LABORATORY TEST RESULT: Primary | ICD-10-CM

## 2022-10-10 DIAGNOSIS — I42.8 NONISCHEMIC CARDIOMYOPATHY (HCC): ICD-10-CM

## 2022-10-10 DIAGNOSIS — D50.9 IRON DEFICIENCY ANEMIA, UNSPECIFIED IRON DEFICIENCY ANEMIA TYPE: ICD-10-CM

## 2022-10-10 DIAGNOSIS — E61.1 IRON DEFICIENCY: ICD-10-CM

## 2022-10-10 RX ORDER — ONDANSETRON 2 MG/ML
8 INJECTION INTRAMUSCULAR; INTRAVENOUS
OUTPATIENT
Start: 2022-10-10

## 2022-10-10 RX ORDER — ALBUTEROL SULFATE 90 UG/1
4 AEROSOL, METERED RESPIRATORY (INHALATION) PRN
OUTPATIENT
Start: 2022-10-10

## 2022-10-10 RX ORDER — SODIUM CHLORIDE 0.9 % (FLUSH) 0.9 %
5-40 SYRINGE (ML) INJECTION PRN
OUTPATIENT
Start: 2022-10-10

## 2022-10-10 RX ORDER — ACETAMINOPHEN 325 MG/1
650 TABLET ORAL
OUTPATIENT
Start: 2022-10-10

## 2022-10-10 RX ORDER — DIPHENHYDRAMINE HYDROCHLORIDE 50 MG/ML
50 INJECTION INTRAMUSCULAR; INTRAVENOUS
OUTPATIENT
Start: 2022-10-10

## 2022-10-10 RX ORDER — EPINEPHRINE 1 MG/ML
0.3 INJECTION, SOLUTION, CONCENTRATE INTRAVENOUS PRN
OUTPATIENT
Start: 2022-10-10

## 2022-10-10 RX ORDER — SODIUM CHLORIDE 9 MG/ML
5-250 INJECTION, SOLUTION INTRAVENOUS PRN
OUTPATIENT
Start: 2022-10-10

## 2022-10-10 RX ORDER — SODIUM CHLORIDE 9 MG/ML
INJECTION, SOLUTION INTRAVENOUS CONTINUOUS
OUTPATIENT
Start: 2022-10-10

## 2022-10-10 RX ORDER — HEPARIN SODIUM (PORCINE) LOCK FLUSH IV SOLN 100 UNIT/ML 100 UNIT/ML
500 SOLUTION INTRAVENOUS PRN
OUTPATIENT
Start: 2022-10-10

## 2022-10-10 NOTE — RESULT ENCOUNTER NOTE
Reviewed SPEP/UPEP results with Dr. Jan Sullivan  We will check free light chains at next CHF clinic appointment Hilbert Sandhoff, order placed)  [facility] please - Refer patient to hemoc/onc (Dr. Narayan Dai)    Spoke with patient and discussed above with her, answered all questions

## 2022-10-10 NOTE — TELEPHONE ENCOUNTER
----- Message from CHELSEA Ruggiero - CNP sent at 10/10/2022 10:04 AM EDT -----  Reviewed SPEP/UPEP results with Dr. Hui Tran  We will check free light chains at next CHF clinic appointment Paula Farah, order placed)  [facility] please - Refer patient to hemoc/onc (Dr. Elida Kraus)    Spoke with patient and discussed above with her, answered all questions

## 2022-10-10 NOTE — TELEPHONE ENCOUNTER
Called patient to discuss lab results (SPEP/UPEP), left message to return call. Will attempt to call at later time if do not get return called.
.

## 2022-10-11 LAB
COXSACKIE B1 ANTIBODY: ABNORMAL
COXSACKIE B2 ANTIBODY: ABNORMAL
COXSACKIE B3 ANTIBODY: ABNORMAL
COXSACKIE B4 ANTIBODY: ABNORMAL
COXSACKIE B5 ANTIBODY: ABNORMAL
COXSACKIE B6 ANTIBODY: ABNORMAL

## 2022-10-14 ENCOUNTER — HOSPITAL ENCOUNTER (OUTPATIENT)
Age: 62
Discharge: HOME | End: 2022-10-14

## 2022-10-14 ENCOUNTER — HOSPITAL ENCOUNTER (OUTPATIENT)
Dept: OTHER | Age: 62
Setting detail: THERAPIES SERIES
Discharge: HOME | End: 2022-10-14

## 2022-10-14 VITALS
DIASTOLIC BLOOD PRESSURE: 80 MMHG | HEART RATE: 65 BPM | BODY MASS INDEX: 35.41 KG/M2 | RESPIRATION RATE: 16 BRPM | WEIGHT: 181.3 LBS | OXYGEN SATURATION: 95 % | SYSTOLIC BLOOD PRESSURE: 132 MMHG

## 2022-10-14 DIAGNOSIS — I42.8 NONISCHEMIC CARDIOMYOPATHY (HCC): ICD-10-CM

## 2022-10-14 DIAGNOSIS — I50.20 HFREF (HEART FAILURE WITH REDUCED EJECTION FRACTION) (HCC): ICD-10-CM

## 2022-10-14 LAB
ANION GAP SERPL CALCULATED.3IONS-SCNC: 9 MMOL/L (ref 7–16)
BUN BLDV-MCNC: 33 MG/DL (ref 6–23)
CALCIUM SERPL-MCNC: 9.4 MG/DL (ref 8.6–10.2)
CHLORIDE BLD-SCNC: 99 MMOL/L (ref 98–107)
CO2: 25 MMOL/L (ref 22–29)
CREAT SERPL-MCNC: 1.2 MG/DL (ref 0.5–1)
GFR AFRICAN AMERICAN: 55
GFR NON-AFRICAN AMERICAN: 55 ML/MIN/1.73
GLUCOSE BLD-MCNC: 107 MG/DL (ref 74–99)
POTASSIUM SERPL-SCNC: 4.8 MMOL/L (ref 3.5–5)
PRO-BNP: 479 PG/ML (ref 0–125)
SODIUM BLD-SCNC: 133 MMOL/L (ref 132–146)

## 2022-10-14 PROCEDURE — 36415 COLL VENOUS BLD VENIPUNCTURE: CPT

## 2022-10-14 PROCEDURE — 80048 BASIC METABOLIC PNL TOTAL CA: CPT

## 2022-10-14 PROCEDURE — 99214 OFFICE O/P EST MOD 30 MIN: CPT

## 2022-10-14 PROCEDURE — 83880 ASSAY OF NATRIURETIC PEPTIDE: CPT

## 2022-10-14 PROCEDURE — 83883 ASSAY NEPHELOMETRY NOT SPEC: CPT

## 2022-10-14 NOTE — PROGRESS NOTES
Congestive Heart Failure [facility]     TITRATION PATIENT     Osiel Mcguire   1960    Referring Provider: Dr. Nat Florentino   Primary Care Physician: Dr. Robert Cabrera DO  Cardiologist: Dr. Nat Florentino / Melinda Lang APRN- CNP  Nephrologist: N/A    History of Present Illness:   Osiel Mcguire is a 57 y.o. female with a history of HFrEF, most recent EF 10% 8/27/2022. Currently wearing a lifevest. Denies any alarms or alerts. Patient Story:  She does not have dyspnea with exertion, shortness of breath, or decline in overall functional capacity. She does not have orthopnea, PND, nocturnal cough or hemoptysis. She does not have abdominal distention or bloating, early satiety, anorexia/change in appetite. She does has a good urinary response to oral diuretic. She does not have lower extremity edema. She denies lightheadedness, dizziness. She denies palpitations, syncope or near syncope. She does not complain of chest pain, pressure, discomfort.        No Known Allergies    Current Outpatient Medications on File Prior to Encounter   Medication Sig Dispense Refill    empagliflozin (JARDIANCE) 10 MG tablet Take 1 tablet by mouth daily 30 tablet 5    sacubitril-valsartan (ENTRESTO) 24-26 MG per tablet Take 1 tablet by mouth 2 times daily 60 tablet 3    levothyroxine (SYNTHROID) 50 MCG tablet Take 1 tablet by mouth daily 30 tablet 5    apixaban (ELIQUIS) 5 MG TABS tablet Take 1 tablet by mouth 2 times daily 180 tablet 2    furosemide (LASIX) 20 MG tablet Take 1 tablet by mouth daily 90 tablet 2    metoprolol succinate (TOPROL XL) 25 MG extended release tablet Take 1 tablet by mouth daily 90 tablet 3    spironolactone (ALDACTONE) 25 MG tablet Take 1 tablet by mouth daily 90 tablet 3    [DISCONTINUED] amLODIPine (NORVASC) 10 MG tablet Take 20 mg by mouth daily      [DISCONTINUED] simvastatin (ZOCOR) 20 MG tablet Take 20 mg by mouth nightly       No current facility-administered medications on file prior to encounter. Guideline directed medical:  ARNI/ACE I/ARB: Yes entresto 24/26 mg BID  Beta blocker:  Yes metoprolol succinate 25 mg daily   Aldosterone antagonist:  Yes spirolactone 25 mg daily   SGLT2i:  Yes jardiance 10 mg daily    Physical Examination:     /80   Pulse 65   Resp 16   Wt 181 lb 4.8 oz (82.2 kg)   SpO2 95%   BMI 35.41 kg/m²     Assessment  Charting Type: Shift assessment (CHF clinic)    Neurological  Level of Consciousness: Alert (0)    Respiratory  Respiratory Pattern: Regular  Respiratory Depth: Normal  Respiratory Quality/Effort: Unlabored  Chest Assessment: Chest expansion symmetrical  L Breath Sounds: Clear  R Breath Sounds: Clear    Cough/Sputum  Cough: None    Cardiac  Cardiac Regularity: Regular  Heart Sounds: S1, S2  Cardiac Rhythm: Sinus rhythm    Rhythm Interpretation  Heart Rate: 65    Gastrointestinal  Abdominal (WDL): Within Defined Limits  Abdomen Inspection: Soft  RUQ Bowel Sounds: Active  LUQ Bowel Sounds: Active  RLQ Bowel Sounds: Active  LLQ Bowel Sounds: Active     Bowel Sounds  RUQ Bowel Sounds: Active  LUQ Bowel Sounds: Active  RLQ Bowel Sounds: Active  LLQ Bowel Sounds:  Active    Peripheral Vascular  Peripheral Vascular (WDL): Within Defined Limits  Edema: Right lower extremity, Left lower extremity  RLE Edema: None  LLE Edema: None    Genitourinary  Genitourinary (WDL): Within Defined Limits    Heart Rate: 65      LAB DATA:    Last 3 BMP      Sodium (mmol/L)   Date Value   10/14/2022 133   09/28/2022 130 (L)   09/16/2022 136     Potassium (mmol/L)   Date Value   10/14/2022 4.8   09/28/2022 4.8   09/16/2022 4.4     Potassium reflex Magnesium (mmol/L)   Date Value   08/29/2022 3.1 (L)   08/27/2022 3.9   08/26/2022 4.0     Chloride (mmol/L)   Date Value   10/14/2022 99   09/28/2022 100   09/16/2022 100     CO2 (mmol/L)   Date Value   10/14/2022 25   09/28/2022 22   09/16/2022 25     BUN (mg/dL)   Date Value 10/14/2022 33 (H)   09/28/2022 35 (H)   09/16/2022 24 (H)     Glucose (mg/dL)   Date Value   10/14/2022 107 (H)   09/28/2022 118 (H)   09/16/2022 133 (H)     Calcium (mg/dL)   Date Value   10/14/2022 9.4   09/28/2022 9.4   09/16/2022 9.1       Last 3 BNP       Pro-BNP (pg/mL)   Date Value   10/14/2022 479 (H)   09/28/2022 736 (H)   09/16/2022 1,807 (H)          CBC: No results for input(s): WBC, HGB, PLT in the last 72 hours. BMP:    Recent Labs     10/14/22  0820      K 4.8   CL 99   CO2 25   BUN 33*   CREATININE 1.2*   GLUCOSE 107*         Hepatic: No results for input(s): AST, ALT, ALB, BILITOT, ALKPHOS in the last 72 hours. Troponin: No results for input(s): TROPONINI in the last 72 hours. BNP: No results for input(s): BNP in the last 72 hours. Lipids: No results for input(s): CHOL, HDL in the last 72 hours. Invalid input(s): LDLCALCU  INR: No results for input(s): INR in the last 72 hours. WEIGHTS:    Wt Readings from Last 3 Encounters:   10/14/22 181 lb 4.8 oz (82.2 kg)   10/06/22 181 lb 9.6 oz (82.4 kg)   09/28/22 180 lb (81.6 kg)     TELEMETRY:  Cardiac Regularity: Regular  Cardiac Rhythm/Interpretation: SR    ASSESSMENT:  Ankur Bob presents for CHF clinic follow up today with stable weights. Lungs are clear, abdomen is soft, and there is no edema present in BLE.       Interventions completed this visit:  IV diuretics given no  Lab work obtained yes, BNP BMP  Reviewed currently prescribed medications with patient, educated on importance of compliance and answered any questions regarding their medication  Educated on low sodium diet    PLAN:  Scheduled to follow up in CHF clinic on   Future Appointments   Date Time Provider Arcadio Phipps   10/26/2022 11:00 AM Darwin Zhang MD [address] [place] [address]   10/28/2022  9:00 AM [facility] ROOM 1 [facility]   11/3/2022 12:30 PM DO ALDEN Perez [facility]   11/29/2022  1:00 PM [facility] MOBILE [facility] 1 8 [facility] Given clinic phone number and aware of signs and symptoms to call with any HF change in symptoms.

## 2022-10-15 PROBLEM — I10 PRIMARY HYPERTENSION: Status: ACTIVE | Noted: 2022-10-15

## 2022-10-16 LAB
KAPPA FREE LIGHT CHAINS QNT: 22.18 MG/L (ref 3.3–19.4)
KAPPA/LAMBDA FREE LIGHT CHAIN RATIO: 1.54 (ref 0.26–1.65)
LAMBDA FREE LIGHT CHAINS QNT: 14.42 MG/L (ref 5.71–26.3)

## 2022-10-21 ENCOUNTER — TELEPHONE (OUTPATIENT)
Dept: CARDIOLOGY CLINIC | Age: 62
End: 2022-10-21

## 2022-10-21 DIAGNOSIS — I50.20 HFREF (HEART FAILURE WITH REDUCED EJECTION FRACTION) (HCC): Primary | ICD-10-CM

## 2022-10-21 NOTE — TELEPHONE ENCOUNTER
Left vm with provider  JAMIR Aceves cnp instructions. Requested call back to confirm understanding. Ask office for samples if needed. Rx sent to [place] [employer] [facility], [facility] for novartis. Next called rx assistance , updated on entresto dose change.      Haritha Hurtado RN

## 2022-10-21 NOTE — RESULT ENCOUNTER NOTE
Labs and CHF clinic note reviewed  /80   Pulse 65     Current GDMT:  Entresto 24/26 mg BID  Toprol 25 mg daily   Spironolactone 25 mg daily   Jardiance 10 mg daily     Please have her increase Entresto moderate dose   Follow up labs in 1 week     Has appointment with Dr. Erin Madsen scheduled on 10/27/2022    Thank you

## 2022-10-21 NOTE — TELEPHONE ENCOUNTER
----- Message from CHELSEA Judge CNP sent at 10/21/2022  1:27 PM EDT -----  Labs and CHF clinic note reviewed  /80   Pulse 65     Current GDMT:  Entresto 24/26 mg BID  Toprol 25 mg daily   Spironolactone 25 mg daily   Jardiance 10 mg daily     Please have her increase Entresto moderate dose   Follow up labs in 1 week     Has appointment with Dr. Natalie Briggs scheduled on 10/27/2022    Thank you

## 2022-10-26 ENCOUNTER — OFFICE VISIT (OUTPATIENT)
Dept: CARDIOLOGY CLINIC | Age: 62
End: 2022-10-26

## 2022-10-26 VITALS
HEART RATE: 63 BPM | SYSTOLIC BLOOD PRESSURE: 116 MMHG | HEIGHT: 60 IN | RESPIRATION RATE: 18 BRPM | WEIGHT: 183 LBS | DIASTOLIC BLOOD PRESSURE: 70 MMHG | BODY MASS INDEX: 35.93 KG/M2

## 2022-10-26 DIAGNOSIS — I42.8 NICM (NONISCHEMIC CARDIOMYOPATHY) (HCC): ICD-10-CM

## 2022-10-26 DIAGNOSIS — I25.10 CAD IN NATIVE ARTERY: Primary | ICD-10-CM

## 2022-10-26 DIAGNOSIS — E66.9 OBESITY (BMI 35.0-39.9 WITHOUT COMORBIDITY): ICD-10-CM

## 2022-10-26 DIAGNOSIS — I10 PRIMARY HYPERTENSION: ICD-10-CM

## 2022-10-26 PROCEDURE — 93000 ELECTROCARDIOGRAM COMPLETE: CPT | Performed by: INTERNAL MEDICINE

## 2022-10-26 PROCEDURE — 99214 OFFICE O/P EST MOD 30 MIN: CPT | Performed by: INTERNAL MEDICINE

## 2022-10-26 PROCEDURE — 3074F SYST BP LT 130 MM HG: CPT | Performed by: INTERNAL MEDICINE

## 2022-10-26 PROCEDURE — 3078F DIAST BP <80 MM HG: CPT | Performed by: INTERNAL MEDICINE

## 2022-10-26 RX ORDER — SIMVASTATIN 20 MG
20 TABLET ORAL NIGHTLY
COMMUNITY

## 2022-10-26 NOTE — PROGRESS NOTES
[facility] Cardiology  Office Visit         Reason for Visit: Heart failure    Primary Cardiologist: Dr. Elizabeth Chen         History of Present Illness:     Ms. Ankur Barraza is a 57year old female with a PMHx of recently diagnosed nonischemic cardiomyopathy, chronic HFrEF, mild nonobstructive CAD of the RCA, LV apical thrombus, NSVT, HTN TN, HLD, hypothyroidism, obesity and prior tobacco abuse. She presented to the emergency room on 8/26/2022 with complaints of chest pain, increased shortness of breath, weight gain, lower extremity edema and orthopnea. She was admitted to the hospital for acute heart failure/NSTEMI. She was IV diuresed and during hospitalization underwent TTE that demonstrated LVEF 10%, stage II DD, preserved RV size, moderate function, moderate TR, mild-moderate MR, trace AI and LV thrombus. She was initiated on GDMT and once improved from a fluid standpoint underwent LHC that demonstrated mild nonobstructive CAD of the RCA. During procedure she had noted NSVT in the setting of hypokalemia, hypomagnesia and was initiated on tapering amiodarone and fitted with LifeVest.    She presents today in follow-up, since discharge from the hospital she has been following with the CHF clinic. She is now tolerating her guideline directed medical therapy well. She denies dyspnea with exertion, shortness of breath, or decline in overall functional capacity. She denies orthopnea, PND, nocturnal cough or hemoptysis. She denies abdominal distention or bloating, early satiety, anorexia/change in appetite, unintentional weight loss. She does not lower extremity edema. She denies exertional lightheadedness. She denies palpitations, syncope or near syncope. Review of systems is negative for chest pain, pressure, discomfort. When ambulating on an incline, She does not leg claudication.  History is negative for neurological symptoms including transient loss of vision, asymmetric weakness, aphasia, dysphasia, numbness, tingling.      Patient Active Problem List    Diagnosis Date Noted    Obesity (BMI 35.0-39.9 without comorbidity) 10/26/2022     Priority: Medium    Primary hypertension 10/15/2022     Priority: Medium    Iron deficiency anemia, unspecified 10/10/2022     Priority: Medium    Iron deficiency 09/19/2022     Priority: Medium    Class 2 obesity due to excess calories without serious comorbidity with body mass index (BMI) of 35.0 to 35.9 in adult 09/19/2022     Priority: Medium    HFrEF (heart failure with reduced ejection fraction) [facility] 09/19/2022     Priority: Medium    LV (left ventricular) mural thrombus 09/19/2022     Priority: Medium    Hypothyroidism 09/18/2022     Priority: Medium    CAD in native artery 08/29/2022     Priority: Medium    NICM (nonischemic cardiomyopathy) ([facility] 08/29/2022     Priority: Medium    NSTEMI (non-ST elevated myocardial infarction) ([facility] 08/26/2022     Priority: Medium           Past Medical History:   Diagnosis Date    Hyperlipidemia     Hypertension     Hypothyroidism 9/18/2022         Past Surgical History:   Procedure Laterality Date    ANKLE SURGERY Right     2012 & 2020         No Known Allergies      Outpatient Medications Marked as Taking for the 10/26/22 encounter (Office Visit) with Sanjeev Sánchez MD   Medication Sig Dispense Refill    simvastatin (ZOCOR) 20 MG tablet Take 20 mg by mouth nightly      sacubitril-valsartan (ENTRESTO) 49-51 MG per tablet Take 1 tablet by mouth 2 times daily 180 tablet 3    empagliflozin (JARDIANCE) 10 MG tablet Take 1 tablet by mouth daily 30 tablet 5    levothyroxine (SYNTHROID) 50 MCG tablet Take 1 tablet by mouth daily 30 tablet 5    apixaban (ELIQUIS) 5 MG TABS tablet Take 1 tablet by mouth 2 times daily 180 tablet 2    furosemide (LASIX) 20 MG tablet Take 1 tablet by mouth daily 90 tablet 2    metoprolol succinate (TOPROL XL) 25 MG extended release tablet Take 1 tablet by mouth daily 90 tablet 3    spironolactone (ALDACTONE) 25 MG tablet Take 1 tablet by mouth daily 90 tablet 3    [DISCONTINUED] simvastatin (ZOCOR) 20 MG tablet Take 20 mg by mouth nightly             Guideline directed medical/device therapy:  ARNI/ACE I/ARB: Yes  Beta blocker: Yes  Aldosterone antagonist:  Yes  SGLT2i: Yes  ICD/CRT-P/-D:   LiveVest  QRS interval on recent ECG (personally reviewed/interpreted): <120 ms  Percentage RV pacing (personally reviewed/interpreted): %/NA        Review of Systems:   Cardiac: As per HPI  General: No fever, chills, rigors  Pulmonary: As per HPI  HEENT: No visual disturbances, difficult swallowing  GI: No nausea, vomiting, abdominal pain  : No dysuria or hematuria  Endocrine: No thyroid disease or diabetes  Musculoskeletal: MAGUIRE x 4, no focal motor deficits  Skin: Intact, no rashes  Neuro/Psych: No headache or seizures          Weights: Wt Readings from Last 3 Encounters:   10/26/22 183 lb (83 kg)   10/14/22 181 lb 4.8 oz (82.2 kg)   10/06/22 181 lb 9.6 oz (82.4 kg)             Physical Examination:     /70   Pulse 63   Resp 18   Ht 5' (1.524 m)   Wt 183 lb (83 kg)   BMI 35.74 kg/m²     CONSTITUTIONAL: Alert and oriented times 3, no acute distress and cooperative to examination with proper mood and affect. SKIN: Skin color, texture, turgor normal. No rashes or lesions. LYMPH: no cervical nodes, no inguinal nodes  HEENT: Head is normocephalic, atraumatic. EOMI, PERRLA. NECK: Supple, symmetrical, trachea midline, no adenopathy, thyroid symmetric, not enlarged and no tenderness, skin normal.  CHEST/LUNGS: chest symmetric with normal A/P diameter, normal respiratory rate and rhythm, lungs clear to auscultation without wheezes, rales or rhonchi. No accessory muscle use. Scars None   CARDIOVASCULAR: Heart sounds are normal.  Regular rate and rhythm without murmur, gallop or rub. Normal S1 and S2. . Carotid and femoral pulses 2+/4 and equal bilaterally. ABDOMEN: Normal shape. No and Laparoscopic scar(s) present. Normal bowel sounds.   No bruits. soft, nondistended, no masses or organomegaly. no evidence of hernia. Percussion: Normal without hepatosplenomegally. Tenderness: absent. RECTAL: deferred, not clinically indicated  NEUROLOGIC: There are no focalizing motor or sensory deficits. CN II-XII are grossly intact. [place] EXTREMITIES: no cyanosis, no clubbing, and no edema. All the following diagnostics were personally reviewed and interpreted by me. LAB DATA:     8/30/22 05:30 9/9/22 11:30 9/16/22 10:55   Sodium 136 135 136   Potassium 3.8 4.6 4.4   Chloride 100 103 100   CO2 28 23 25   BUN,BUNPL 10 29 (H) 24 (H)   Creatinine 0.9 1.1 (H) 1.0   Anion Gap 8 9 11   GFR Non- >60 >60 >60   GFR  >60 >60 >60   Magnesium 1.9     Glucose, Random 112 (H) 120 (H) 133 (H)   CALCIUM, SERUM, 804088 8.3 (L) 9.0 9.1   Pro-BNP  1,964 (H) 1,807 (H)   TSH 8.560 (H)     T4 Free 0.97     WBC 5.7     RBC 5.00     Hemoglobin Quant 14.1     Hematocrit 42.0     MCV 84.0     MCH 28.2     MCHC 33.6     MPV 11.4     RDW 16.0 (H)     Platelet Count 458         IMAGING:    [facility] Pulmonary (8/26/2022)  Impression  No evidence of pulmonary embolism or acute pulmonary abnormality. There is mild pulmonary edema and a mild right basilar pleural effusion. CXR (8/26/2022)  FINDINGS:  Enlarged cardiac silhouette. Interstitial opacities throughout both lungs. More confluent opacities are present in the lung bases. No obvious pleural  effusion. No pneumothorax. Impression  1. Enlarged cardiac silhouette related to cardiomegaly or pericardial  effusion. 2. Pulmonary vascular congestion. CARDIAC TESTING:    TTE (8/27/2022)   Summary   Mildly dilated left ventricular chamber size. Severe global hypokinesis with regional variations in wall motion. There   is apical akinesis. Visually estimated LVEF is 10%. Grade II diastolic dysfunction. Elevated LA pressure. Normal right ventricle structure and moderate function.    The left atrium is moderately dilated. Normal right atrial size   Mild-moderate mitral regurgitation is present. Moderate tricuspid regurgitation. Moderate-severe pulmonary hypertension with a PASP of 64 mm Hg. Elevated RA pressure. There is a fixed echodensity measuring 2 x 1.8 cms at the LV apex. This is   consistent with an LV thrombus. There is another fixed, smaller   echodensity, also at the apex consistent with a thrombus. No comparison study available. [facility] (8/29/2022)  ANGIOGRAPHIC FINDINGS:    The left main coronary artery arises normally from the left sinus of Valsalva. It is relatively short vessel, mid-sized vessel without any disease. It trifurcates into left anterior descending artery and left circumflex artery. The left anterior descending artery extends down to the apex and mildly tortuous. It gives off good size diagonal branch that has no CAD. The ramus intermedius artery is a mid size vessel, tortuous without any disease. The left circumflex artery is large vessel, gives off two small OM branches and very large left posterolateral branch. The left circumflex artery and its branches have no CAD. The right coronary artery arises normally from the right sinus of Valsalva. It is tortuous vessel, dominant circulation with 30% disease in the mid segment. IMPRESSION:  1. Mild disease involving mid RCA. Otherwise, angiographically normal coronary arteries. 2.  Nonischemic cardiomyopathy. EKG  Sinus Rhythm   LAE  LVH  Diffuse nonspecific T-abnormality. ASSESSMENT:  Chronic HFrEF  ACC stage C / NYHA class II  Euvolemic   Nonischemic cardiomyopathy  LVEF 10%, LVEDD 5.9, LVMI 149  Mild nonobstructive CAD of RCA   LV apical thrombus - on Eliquis  NSVT - on tapering amiodarone   HTN  HLD  Hypothyroidism - on HRT  Obesity  Plausible KAMRAN  Prior tobacco abuse      PLAN:  She appears stable and euvolemic on examination.   She will continue with current doses of Entresto, [place], Toprol-XL as well as Aldactone. She is on Eliquis for LV apical thrombus. She is currently on LifeVest without any alarms. I will have it interrogated to look for any underlying arrhythmias. She does have a significant family history of cardiomyopathies. Unclear if there is any coronary artery disease in the family. Her sister  at the age of 48 for heart attack. She states an autopsy was done but does not know the findings. She will find out and let me know. She underwent an immune as well as serum electrophoresis as well as free light chain assay. She has an M spike of 2.4. She does have elevated IgG kappa levels. She has a follow-up with oncology tomorrow for further evaluation. 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 It is possible that she has AL cardiac amyloidosis however, given her significant family history, an underlying genetic dilated cardiomyopathy cannot be ruled out. To this end, I will schedule her for a cardiac MRI to not only assess her biventricular systolic function but also to further evaluate for possible underlying etiology. We will also be able to assess the status of her LV apical thrombus. She will continue to follow-up with the congestive heart failure clinic.   I will see her back in the office in 6 months

## 2022-10-26 NOTE — PATIENT INSTRUCTIONS
Continue all your medications at current doses. We will schedule a Cardiac MRI. Restrict sodium intake to less than 2-2.5 g/day. Restrict fluid intake to less than 2.2 L/day. Goal BP is less than 130/80. If your weight increases by > 2 lbs in a day or >5 lbs in more than a day, take an extra lasix pill. Please try to exercise for 150 minutes a week. I will see you back in the office in 6 months. Please call the office at [phone], option [phone]) if you have any questions.

## 2022-10-27 ENCOUNTER — HOSPITAL ENCOUNTER (OUTPATIENT)
Dept: INFUSION THERAPY | Age: 62
Discharge: HOME | End: 2022-10-27

## 2022-10-27 ENCOUNTER — HOSPITAL ENCOUNTER (OUTPATIENT)
Age: 62
Discharge: HOME | End: 2022-10-27

## 2022-10-27 ENCOUNTER — OFFICE VISIT (OUTPATIENT)
Dept: ONCOLOGY | Age: 62
End: 2022-10-27

## 2022-10-27 VITALS
TEMPERATURE: 97.6 F | DIASTOLIC BLOOD PRESSURE: 75 MMHG | HEART RATE: 57 BPM | HEIGHT: 60 IN | WEIGHT: 185 LBS | BODY MASS INDEX: 36.32 KG/M2 | SYSTOLIC BLOOD PRESSURE: 131 MMHG | OXYGEN SATURATION: 98 %

## 2022-10-27 DIAGNOSIS — D64.9 ANEMIA, UNSPECIFIED TYPE: ICD-10-CM

## 2022-10-27 DIAGNOSIS — D64.9 ANEMIA, UNSPECIFIED TYPE: Primary | ICD-10-CM

## 2022-10-27 LAB
ALBUMIN SERPL-MCNC: 3.9 G/DL (ref 3.5–5.2)
ALP BLD-CCNC: 89 U/L (ref 35–104)
ALT SERPL-CCNC: 18 U/L (ref 0–32)
ANION GAP SERPL CALCULATED.3IONS-SCNC: 8 MMOL/L (ref 7–16)
AST SERPL-CCNC: 18 U/L (ref 0–31)
BASOPHILS ABSOLUTE: 0.05 E9/L (ref 0–0.2)
BASOPHILS RELATIVE PERCENT: 0.8 % (ref 0–2)
BILIRUB SERPL-MCNC: 0.2 MG/DL (ref 0–1.2)
BUN BLDV-MCNC: 41 MG/DL (ref 6–23)
CALCIUM SERPL-MCNC: 9.3 MG/DL (ref 8.6–10.2)
CHLORIDE BLD-SCNC: 100 MMOL/L (ref 98–107)
CO2: 26 MMOL/L (ref 22–29)
CREAT SERPL-MCNC: 1.3 MG/DL (ref 0.5–1)
EOSINOPHILS ABSOLUTE: 0.12 E9/L (ref 0.05–0.5)
EOSINOPHILS RELATIVE PERCENT: 2 % (ref 0–6)
FERRITIN: 31 NG/ML
GFR SERPL CREATININE-BSD FRML MDRD: 46 ML/MIN/1.73
GLUCOSE BLD-MCNC: 121 MG/DL (ref 74–99)
HCT VFR BLD CALC: 37.4 % (ref 34–48)
HEMOGLOBIN: 11.9 G/DL (ref 11.5–15.5)
IMMATURE GRANULOCYTES #: 0.01 E9/L
IMMATURE GRANULOCYTES %: 0.2 % (ref 0–5)
IRON SATURATION: 8 % (ref 15–50)
IRON: 35 MCG/DL (ref 37–145)
LYMPHOCYTES ABSOLUTE: 2.06 E9/L (ref 1.5–4)
LYMPHOCYTES RELATIVE PERCENT: 34.9 % (ref 20–42)
MCH RBC QN AUTO: 27.7 PG (ref 26–35)
MCHC RBC AUTO-ENTMCNC: 31.8 % (ref 32–34.5)
MCV RBC AUTO: 87 FL (ref 80–99.9)
MONOCYTES ABSOLUTE: 0.63 E9/L (ref 0.1–0.95)
MONOCYTES RELATIVE PERCENT: 10.7 % (ref 2–12)
NEUTROPHILS ABSOLUTE: 3.04 E9/L (ref 1.8–7.3)
NEUTROPHILS RELATIVE PERCENT: 51.4 % (ref 43–80)
PDW BLD-RTO: 18.1 FL (ref 11.5–15)
PLATELET # BLD: 377 E9/L (ref 130–450)
PMV BLD AUTO: 10.9 FL (ref 7–12)
POTASSIUM SERPL-SCNC: 5.1 MMOL/L (ref 3.5–5)
RBC # BLD: 4.3 E12/L (ref 3.5–5.5)
SODIUM BLD-SCNC: 134 MMOL/L (ref 132–146)
TOTAL IRON BINDING CAPACITY: 420 MCG/DL (ref 250–450)
TOTAL PROTEIN: 9.5 G/DL (ref 6.4–8.3)
WBC # BLD: 5.9 E9/L (ref 4.5–11.5)

## 2022-10-27 PROCEDURE — 82728 ASSAY OF FERRITIN: CPT

## 2022-10-27 PROCEDURE — 99214 OFFICE O/P EST MOD 30 MIN: CPT

## 2022-10-27 PROCEDURE — 83883 ASSAY NEPHELOMETRY NOT SPEC: CPT

## 2022-10-27 PROCEDURE — 86334 IMMUNOFIX E-PHORESIS SERUM: CPT

## 2022-10-27 PROCEDURE — 85025 COMPLETE CBC W/AUTO DIFF WBC: CPT

## 2022-10-27 PROCEDURE — 83550 IRON BINDING TEST: CPT

## 2022-10-27 PROCEDURE — 84166 PROTEIN E-PHORESIS/URINE/CSF: CPT

## 2022-10-27 PROCEDURE — 80053 COMPREHEN METABOLIC PANEL: CPT

## 2022-10-27 PROCEDURE — 36415 COLL VENOUS BLD VENIPUNCTURE: CPT

## 2022-10-27 PROCEDURE — 83540 ASSAY OF IRON: CPT

## 2022-10-27 NOTE — PROGRESS NOTES
[address]  [id number]   Hematology/Oncology  Consult      Patient Name: Conrad Ceballos  YOB: 1960  PCP: Jaki Shaffer DO   Referring Provider: [address] [place] [id number]     Reason for Consultation:   Chief Complaint   Patient presents with    New Patient        History of Present Illness:  57years old female referred by Dr. Negrito Denis, for abnormal SPEP. She was admitted in August 2022 with congestive heart failure and NSTEMI. Found to have coronary artery disease, EF of 10%, left atrial thrombus. On GDMT and Eliquis and LifeVest.  Serum immunofixation showed monoclonal IgG G kappa. M spike was not quantified. Serum free kappa was 22, lambda 14 with a normal ratio of 1.5. Random UPEP also showed monoclonal IgG kappa. Chemistry from 2 weeks ago show creatinine of 1.2, calcium 9.4. No recent hemoglobin available. Patient denies any bony pain. Cardiac MRI has been ordered to rule out cardiac amyloidosis. Review of systems: Over 10 systems were reviewed and all were negative except as mentioned above.     Diagnostic Data:     Past Medical History:   Diagnosis Date    Hyperlipidemia     Hypertension     Hypothyroidism 9/18/2022       Patient Active Problem List    Diagnosis Date Noted    Obesity (BMI 35.0-39.9 without comorbidity) 10/26/2022    Primary hypertension 10/15/2022    Iron deficiency anemia, unspecified 10/10/2022    Iron deficiency 09/19/2022    Class 2 obesity due to excess calories without serious comorbidity with body mass index (BMI) of 35.0 to 35.9 in adult 09/19/2022    HFrEF (heart failure with reduced ejection fraction) ([address] 09/19/2022    LV (left ventricular) mural thrombus 09/19/2022    Hypothyroidism 09/18/2022    CAD in native artery 08/29/2022    NICM (nonischemic cardiomyopathy) ([address] 08/29/2022    NSTEMI (non-ST elevated myocardial infarction) ([address] 08/26/2022        Past Surgical History:   Procedure Laterality Date    ANKLE SURGERY Right      &        Family History  Family History   Problem Relation Age of Onset    Heart Failure Mother          at age 80 years, was a smoker    Atrial Fibrillation Mother     Heart Failure Father          at age 71, was a smoker    Diabetes Father     Heart Attack Sister          at age 48 years, smoked    Arthritis Sister         smoked    Diabetes type 2  Brother         smoked    Diabetes type 2  Brother         smoked    Obesity Brother     Diabetes Brother         did not smoke       Social History    TOBACCO:   reports that she quit smoking about 9 months ago. Her smoking use included cigarettes. She started smoking about 43 years ago. She has a 10.50 pack-year smoking history. She has never used smokeless tobacco.  ETOH:   reports current alcohol use. Home Medications  Prior to Admission medications    Medication Sig Start Date End Date Taking?  Authorizing Provider   simvastatin (ZOCOR) 20 MG tablet Take 20 mg by mouth nightly   Yes Historical Provider, MD   sacubitril-valsartan (ENTRESTO) 49-51 MG per tablet Take 1 tablet by mouth 2 times daily 10/21/22  Yes CHELSEA Davenport CNP   empagliflozin (JARDIANCE) 10 MG tablet Take 1 tablet by mouth daily 22  Yes CHELSEA Davenport CNP   levothyroxine (SYNTHROID) 50 MCG tablet Take 1 tablet by mouth daily 9/15/22  Yes Sherrill Paris DO   apixaban (ELIQUIS) 5 MG TABS tablet Take 1 tablet by mouth 2 times daily 22  Yes Mery Day MD   furosemide (LASIX) 20 MG tablet Take 1 tablet by mouth daily 22  Yes Mery Day MD   metoprolol succinate (TOPROL XL) 25 MG extended release tablet Take 1 tablet by mouth daily 22  Yes Mery Day MD   spironolactone (ALDACTONE) 25 MG tablet Take 1 tablet by mouth daily 22  Yes Mery Day MD   amLODIPine (NORVASC) 10 MG tablet Take 20 mg by mouth daily  22  Historical Provider, MD       Allergies  No Known Allergies    Review of Systems:      Objective  /75   Pulse 57   Temp 97.6 °F (36.4 °C)   Ht 5' (1.524 m)   Wt 185 lb (83.9 kg)   SpO2 98%   BMI 36.13 kg/m²     Physical Performance Status    Physical Exam:  General: AAO to person, place, time, and purpose, appears stated age, cooperative, no acute distress, pleasant   Head and neck : PERRLA, EOMI . Sclera non icteric. Oropharynx : Clear  Neck: no JVD,  no adenopathy, no carotid bruit  LYMPHATICS : No peripheral lymphadenopathy. Lungs: Clear to auscultation   Heart: Regular rate and regular rhythm, no murmur, normal S1, S2  Abdomen: Soft, non-tender;no masses, no organomegaly  Extremities: No edema,no cyanosis, no clubbing. Skin:  No rash. Neurologic:Cranial nerves grossly intact. No focal motor or sensory deficits . Recent Laboratory Data-   Lab Results   Component Value Date    WBC 5.9 10/27/2022    HGB 11.9 10/27/2022    HCT 37.4 10/27/2022    MCV 87.0 10/27/2022     10/27/2022    LYMPHOPCT 34.9 10/27/2022    RBC 4.30 10/27/2022    MCH 27.7 10/27/2022    MCHC 31.8 (L) 10/27/2022    RDW 18.1 (H) 10/27/2022    NEUTOPHILPCT 51.4 10/27/2022    MONOPCT 10.7 10/27/2022    BASOPCT 0.8 10/27/2022    NEUTROABS 3.04 10/27/2022    LYMPHSABS 2.06 10/27/2022    MONOSABS 0.63 10/27/2022    EOSABS 0.12 10/27/2022    BASOSABS 0.05 10/27/2022       Lab Results   Component Value Date     10/27/2022    K 5.1 (H) 10/27/2022     10/27/2022    CO2 26 10/27/2022    BUN 41 (H) 10/27/2022    CREATININE 1.3 (H) 10/27/2022    GLUCOSE 121 (H) 10/27/2022    CALCIUM 9.3 10/27/2022    PROT 9.3 (H) 09/28/2022    LABALBU 3.9 10/27/2022    BILITOT 0.2 10/27/2022    ALKPHOS 89 10/27/2022    AST 18 10/27/2022    ALT 18 10/27/2022    LABGLOM 46 10/27/2022    GFRAA 55 10/14/2022       Lab Results   Component Value Date    IRON 44 09/28/2022    TIBC 393 09/28/2022    FERRITIN 44 09/28/2022           Radiology-    No results found.       ASSESSMENT/PLAN Stuart Griffith was seen today for new patient. Diagnoses and all orders for this visit:    Anemia, unspecified type  -     Electrophoresis Protein, Serum; Future  -     CBC with Auto Differential; Future  -     Comprehensive Metabolic Panel; Future  -     Miscellaneous Sendout; Future  -     Iron and TIBC; Future  -     Ferritin; Future  -     Prot E4 Frac/Quant (Ur)    57years old female with congestive heart failure, coronary artery disease, atrial thrombus, on GDMT, Eliquis LifeVest with monoclonal IgG kappa and serum and urine. M spike was not quantified. We will repeat SPEP and 24-hour UPEP to quantify monoclonal protein. Serum free light chains are unremarkable. Hemoglobin, creatinine, calcium normal.  No bone pain. Cardiac MRI has been ordered by Dr. Sorin Pina to rule out cardiac amyloidosis. Blood test from last month show low normal ferritin of 44, iron saturation of 11 TIBC of 400. We will repeat iron panel. Return to clinic in a week. Thank you for the consult. 10/28/2022- M spike was 2.4. Called the patient and advised bone marrow biopsy and skeletal survey. Patient agreed. Bone marrow biopsy and skeletal survey requested. Return in about 1 week (around 11/3/2022).      Simone Turner MD   Electronically signed 10/27/2022 at 2:59 PM

## 2022-10-27 NOTE — PROGRESS NOTES
Barrera Jessica  1960 57 y.o. Referring Physician:     PCP: Juan Barth. Sukh,     Vitals:    10/27/22 1028   BP: 131/75   Pulse: 57   Temp: 97.6 °F (36.4 °C)   SpO2: 98%        Wt Readings from Last 3 Encounters:   10/27/22 185 lb (83.9 kg)   10/26/22 183 lb (83 kg)   10/14/22 181 lb 4.8 oz (82.2 kg)        Body mass index is 36.13 kg/m². Chief Complaint:   Chief Complaint   Patient presents with    New Patient         Cancer Staging  No matching staging information was found for the patient. Prior Radiation Therapy? NO    Concurrent Chemo/radiation? NO    Prior Chemotherapy? NO    Prior Hormonal Therapy? NO    Head and Neck Cancer? No, patient does NOT have HN cancer.       LMP: menopause    Age at first Menses: 4    : 0    Para: 0          Current Outpatient Medications:     simvastatin (ZOCOR) 20 MG tablet, Take 20 mg by mouth nightly, Disp: , Rfl:     sacubitril-valsartan (ENTRESTO) 49-51 MG per tablet, Take 1 tablet by mouth 2 times daily, Disp: 180 tablet, Rfl: 3    empagliflozin (JARDIANCE) 10 MG tablet, Take 1 tablet by mouth daily, Disp: 30 tablet, Rfl: 5    levothyroxine (SYNTHROID) 50 MCG tablet, Take 1 tablet by mouth daily, Disp: 30 tablet, Rfl: 5    apixaban (ELIQUIS) 5 MG TABS tablet, Take 1 tablet by mouth 2 times daily, Disp: 180 tablet, Rfl: 2    furosemide (LASIX) 20 MG tablet, Take 1 tablet by mouth daily, Disp: 90 tablet, Rfl: 2    metoprolol succinate (TOPROL XL) 25 MG extended release tablet, Take 1 tablet by mouth daily, Disp: 90 tablet, Rfl: 3    spironolactone (ALDACTONE) 25 MG tablet, Take 1 tablet by mouth daily, Disp: 90 tablet, Rfl: 3       Past Medical History:   Diagnosis Date    Hyperlipidemia     Hypertension     Hypothyroidism 2022       Past Surgical History:   Procedure Laterality Date    ANKLE SURGERY Right      &        Family History   Problem Relation Age of Onset    Heart Failure Mother          at age 80 years, was a smoker    Atrial Fibrillation Mother     Heart Failure Father          at age 71, was a smoker    Diabetes Father     Heart Attack Sister          at age 48 years, smoked    Arthritis Sister         smoked    Diabetes type 2  Brother         smoked    Diabetes type 2  Brother         smoked    Obesity Brother     Diabetes Brother         did not smoke       Social History     Socioeconomic History    Marital status:      Spouse name: 2nd : Mr. 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  She had had her first cigarette at age 22 years, for 42 years she had averaged 1/4 PPD for 10.5 pack-years   Vaping Use    Vaping Use: Never used   Substance and Sexual Activity    Alcohol use: Yes     Comment: occasional beer    Drug use: Never    Sexual activity: Not on file     Comment: has no kids   Other Topics Concern    Not on file   Social History Narrative    CODE STATUS: Full Code    HEALTH CARE PROXY: her , Mr. Casimiro aPlmer, [phone]    AMBULATES: independently    DOMICILED: has stairs in the home, uses the stairs, lives with her  and her brother, has no home pets        No caffiene     Social Determinants of Health     Financial Resource Strain: Low Risk     Difficulty of Paying Living Expenses: Not very hard   Food Insecurity: No Food Insecurity    Worried About Running Out of Food in the Last Year: Never true    [address] in the Last Year: Never true   Transportation Needs: No Transportation Needs    Lack of Transportation (Medical): No    Lack of Transportation (Non-Medical): No   Physical Activity: Sufficiently Active    Days of Exercise per Week: 7 days    Minutes of Exercise per Session: 30 min   Stress: No Stress Concern Present    Feeling of Stress : Not at all   Social Connections: Moderately Isolated    Frequency of Communication with Friends and Family: More than three times a week    Frequency of Social Gatherings with Friends and Family: Three times a week    Attends Bahai Services: Never    Active Member of Clubs or Organizations: No    Attends Club or Organization Meetings: Never    Marital Status:    Intimate Partner Violence: Not At Risk    Fear of Current or Ex-Partner: No    Emotionally Abused: No    Physically Abused: No    Sexually Abused: No   Housing Stability: Unknown    Unable to Pay for Housing in the Last Year: No    Number of [place] in the Last Year: Not on file    Unstable Housing in the Last Year: No           Occupation: retired  Retired:  YES: Patient is retired from [employer].          [address]:     Pacemaker/Defibulator/ICD:  No    Mediport: No           FALLS RISK SCREENING ASSESSMENT    Instructions:  Assess the patient and [place] the appropriate indicators that are present for fall risk identification. Total the numbers circled and assign a fall risk score from Table 2.  Reassess patient at a minimum every 12 weeks or with status change. Assessment   Date  10/27/2022     1. Mental Ability: confusion/cognitively impaired No - 0       2. Elimination Issues: incontinence, frequency No - 0       3. Ambulatory: use of assistive devices (walker, cane, off-loading devices), attached to equipment (IV pole, oxygen) No - 0     4. Sensory Limitations: dizziness, vertigo, impaired vision No - 0       5. Age Less than 65 years - 0       6. Medication: diuretics, strong analgesics, hypnotics, sedatives, antihypertensive agents   Yes - 3   7. Falls:  recent history of falls within the last 3 months (not to include slipping or tripping)   No - 0   TOTAL 3    If score of 4 or greater was education given? No       TABLE 2   Risk Score Risk Level Plan of Care   0-3 Little or  No Risk 1. Provide assistance as indicated for ambulation activities  2. Reorient confused/cognitively impaired patient  3. Call-light/bell within patient's reach  4. Chair/bed in low position, stretcher/bed with siderails up except when performing patient care activities  5. Educate patient/family/caregiver on falls prevention  6.  Reassess in 12 weeks or with any noted change in patient condition which places them at a risk for a fall   4-6 Moderate Risk 1. Provide assistance as indicated for ambulation activities  2. Reorient confused/cognitively impaired patient  3. Call-light/bell within patient's reach  4. Chair/bed in low position, stretcher/bed with siderails up except when performing patient care activities  5. Educate patient/family/caregiver on falls prevention  6. Falls risk precaution (Yellow sticker Level II) placed on patient chart   7 or   Higher High Risk 1. Place patient in easily observable treatment room  2. Patient attended at all times by family member or staff  3. Provide assistance as indicated for ambulation activities  4. Reorient confused/cognitively impaired patient  5. Call-light/bell within patient's reach  6. Chair/bed in low position, stretcher/bed with siderails up except when performing patient care activities  7. Educate patient/family/caregiver on falls prevention  8. Falls risk precaution (Yellow sticker Level III) placed on patient chart           MALNUTRITION RISK SCREENING ASSESSMENT    Instructions:  Assess the patient and enter the appropriate indicators that are present for nutrition risk identification. Total the numbers entered and assign a risk score. Follow the appropriate action for total score listed below. Assessment   Date  10/27/2022     Have you lost weight without trying? 0- No     Have you been eating poorly because of a decreased appetite? 0- No   3. Do you have a diagnosis of head and neck cancer? 0- No                                                                                    TOTAL 0        Score of 0-1: No action  Score 2 or greater:   For Non-Diabetic Patient: Recommend adding Ensure Enlive 2 x daily and provide patient with Ensure wellness bag with coupons  For Diabetic Patient: Recommend adding Glucerna Shake 2 x daily and provide patient with Glucerna Wellness bag with coupons  Route to the dietitian via Tamala Leventhal, RN

## 2022-10-28 ENCOUNTER — HOSPITAL ENCOUNTER (OUTPATIENT)
Dept: OTHER | Age: 62
Setting detail: THERAPIES SERIES
Discharge: HOME | End: 2022-10-28

## 2022-10-28 VITALS
SYSTOLIC BLOOD PRESSURE: 124 MMHG | DIASTOLIC BLOOD PRESSURE: 86 MMHG | BODY MASS INDEX: 36.32 KG/M2 | WEIGHT: 185 LBS | OXYGEN SATURATION: 99 % | HEART RATE: 63 BPM | RESPIRATION RATE: 16 BRPM | HEIGHT: 60 IN

## 2022-10-28 LAB — PRO-BNP: 176 PG/ML (ref 0–125)

## 2022-10-28 PROCEDURE — 83880 ASSAY OF NATRIURETIC PEPTIDE: CPT

## 2022-10-28 PROCEDURE — 99214 OFFICE O/P EST MOD 30 MIN: CPT

## 2022-10-28 PROCEDURE — 36415 COLL VENOUS BLD VENIPUNCTURE: CPT

## 2022-10-28 NOTE — PROGRESS NOTES
Congestive Heart Failure [facility]     TITRATION PATIENT     Amaris Bansal   1960    Referring Provider: Dr. Danyell Woody   Primary Care Physician: Dr. Ivelisse Oh DO  Cardiologist: Dr. Danyell Woody / Nimo TRAN- CNP  Nephrologist: N/A    History of Present Illness:   Amaris Bansal is a 57 y.o. female with a history of HFrEF, most recent EF 10% 8/27/2022. Currently wearing a lifevest. Denies any alarms or alerts. Patient Story:  She does not have dyspnea with exertion, shortness of breath, or decline in overall functional capacity. She does not have orthopnea, PND, nocturnal cough or hemoptysis. She does not have abdominal distention or bloating, early satiety, anorexia/change in appetite. She does has a good urinary response to oral diuretic. She does not have lower extremity edema. She denies lightheadedness, dizziness. She denies palpitations, syncope or near syncope. She does not complain of chest pain, pressure, discomfort.        No Known Allergies    Current Outpatient Medications on File Prior to Encounter   Medication Sig Dispense Refill    simvastatin (ZOCOR) 20 MG tablet Take 20 mg by mouth nightly      sacubitril-valsartan (ENTRESTO) 49-51 MG per tablet Take 1 tablet by mouth 2 times daily 180 tablet 3    empagliflozin (JARDIANCE) 10 MG tablet Take 1 tablet by mouth daily 30 tablet 5    levothyroxine (SYNTHROID) 50 MCG tablet Take 1 tablet by mouth daily 30 tablet 5    apixaban (ELIQUIS) 5 MG TABS tablet Take 1 tablet by mouth 2 times daily 180 tablet 2    furosemide (LASIX) 20 MG tablet Take 1 tablet by mouth daily 90 tablet 2    metoprolol succinate (TOPROL XL) 25 MG extended release tablet Take 1 tablet by mouth daily 90 tablet 3    spironolactone (ALDACTONE) 25 MG tablet Take 1 tablet by mouth daily 90 tablet 3    [DISCONTINUED] amLODIPine (NORVASC) 10 MG tablet Take 20 mg by mouth daily       No current facility-administered medications on file prior to encounter. Guideline directed medical:  ARNI/ACE I/ARB: Yes entresto 24/26 mg BID  Beta blocker:  Yes metoprolol succinate 25 mg daily   Aldosterone antagonist:  Yes spirolactone 25 mg daily   SGLT2i:  Yes jardiance 10 mg daily    Physical Examination:     /86   Pulse 63   Resp 16   Ht 5' (1.524 m)   Wt 185 lb (83.9 kg)   SpO2 99%   BMI 36.13 kg/m²     Assessment  Charting Type: Shift assessment (CHF clinic  NYHA I)    Neurological  Level of Consciousness: Alert (0)    Respiratory  Respiratory Pattern: Regular  Respiratory Depth: Normal  Respiratory Quality/Effort: Unlabored  Chest Assessment: Chest expansion symmetrical  L Breath Sounds: Clear  R Breath Sounds: Clear    Cough/Sputum  Cough: None    Cardiac  Cardiac Regularity: Regular  Heart Sounds: S1, S2  Cardiac Rhythm: Sinus rhythm    Rhythm Interpretation  Heart Rate: 63    Gastrointestinal  Abdominal (WDL): Within Defined Limits  Abdomen Inspection: Soft  RUQ Bowel Sounds: Active  LUQ Bowel Sounds: Active  RLQ Bowel Sounds: Active  LLQ Bowel Sounds: Active     Bowel Sounds  RUQ Bowel Sounds: Active  LUQ Bowel Sounds: Active  RLQ Bowel Sounds: Active  LLQ Bowel Sounds:  Active    Peripheral Vascular  Peripheral Vascular (WDL): Within Defined Limits  Edema: Right lower extremity, Left lower extremity  RLE Edema: None  LLE Edema: None    Genitourinary  Genitourinary (WDL): Within Defined Limits    Heart Rate: 63      LAB DATA:    Last 3 BMP      Sodium (mmol/L)   Date Value   10/27/2022 134   10/14/2022 133   09/28/2022 130 (L)     Potassium (mmol/L)   Date Value   10/27/2022 5.1 (H)   10/14/2022 4.8   09/28/2022 4.8     Potassium reflex Magnesium (mmol/L)   Date Value   08/29/2022 3.1 (L)   08/27/2022 3.9   08/26/2022 4.0     Chloride (mmol/L)   Date Value   10/27/2022 100   10/14/2022 99   09/28/2022 100     CO2 (mmol/L)   Date Value   10/27/2022 26   10/14/2022 25   09/28/2022 22     BUN (mg/dL)   Date Value 10/27/2022 41 (H)   10/14/2022 33 (H)   2022 35 (H)     Glucose (mg/dL)   Date Value   10/27/2022 121 (H)   10/14/2022 107 (H)   2022 118 (H)     Calcium (mg/dL)   Date Value   10/27/2022 9.3   10/14/2022 9.4   2022 9.4       Last 3 BNP       Pro-BNP (pg/mL)   Date Value   10/28/2022 176 (H)   10/14/2022 479 (H)   2022 736 (H)          CBC:   Recent Labs     10/27/22  1131   WBC 5.9   HGB 11.9        BMP:    Recent Labs     10/27/22  1131      K 5.1*      CO2 26   BUN 41*   CREATININE 1.3*   GLUCOSE 121*         Hepatic:   Recent Labs     10/27/22  1131   AST 18   ALT 18   BILITOT 0.2   ALKPHOS 89     Troponin: No results for input(s): TROPONINI in the last 72 hours. BNP: No results for input(s): BNP in the last 72 hours. Lipids: No results for input(s): CHOL, HDL in the last 72 hours. Invalid input(s): LDLCALCU  INR: No results for input(s): INR in the last 72 hours. WEIGHTS:    Wt Readings from Last 3 Encounters:   10/28/22 185 lb (83.9 kg)   10/27/22 185 lb (83.9 kg)   10/26/22 183 lb (83 kg)     TELEMETRY:  Cardiac Regularity: Regular  Cardiac Rhythm/Interpretation: SR    ASSESSMENT:  Enrique Muskegon presents for CHF clinic follow up today with stable weights. Feels great. Lungs are clear, abdomen is soft, and there is no edema present in BLE. Patient is weighting herself daily and states overall stables weights. Dr. Gayla Walden seen patient on 10/26 and  ordered a Cardiac MRI to be done.      Interventions completed this visit:  IV diuretics given no  Lab work obtained yes, BNP BMP  Reviewed currently prescribed medications with patient, educated on importance of compliance and answered any questions regarding their medication  Educated on low sodium diet    PLAN:  Scheduled to follow up in CHF clinic on   Future Appointments   Date Time Provider Arcadio Phipps   11/3/2022 12:30 PM Nikki Ingram, [address]   11/10/2022 10:45 AM Rachelle Spence Erin Madsen MD Blood Cancer [facility]   11/29/2022  1:00 PM [facility] MOBILE YOLANDE RM 1 SEYZ MOBILE [facility]iol[facility]     Given clinic phone number and aware of signs and symptoms to call with any HF change in symptoms.

## 2022-10-28 NOTE — PROGRESS NOTES
Received orders from Mark BRONSON RE:     Monitor K in diet   2.    3 weeks follow up at the CHF clinic per Mark BRONSON        12:31 PM 10/28/2022  Called patient re: providers instructions. I have reviewed the provider's instructions with the patient, answering all questions to her satisfaction.

## 2022-10-30 LAB
KAPPA FREE LIGHT CHAINS QNT: 79.31 MG/L (ref 3.3–19.4)
KAPPA/LAMBDA FREE LIGHT CHAIN RATIO: 4.87 (ref 0.26–1.65)
LAMBDA FREE LIGHT CHAINS QNT: 16.28 MG/L (ref 5.71–26.3)

## 2022-11-01 LAB
ADDENDUM ELECTROPHORESIS URINE RANDOM: NORMAL
IMMUNOFIXATION URINE: NORMAL

## 2022-11-03 ENCOUNTER — HOSPITAL ENCOUNTER (OUTPATIENT)
Age: 62
Discharge: HOME | End: 2022-11-03

## 2022-11-03 ENCOUNTER — OFFICE VISIT (OUTPATIENT)
Dept: FAMILY MEDICINE CLINIC | Age: 62
End: 2022-11-03

## 2022-11-03 VITALS
WEIGHT: 185.6 LBS | HEIGHT: 60 IN | BODY MASS INDEX: 36.44 KG/M2 | RESPIRATION RATE: 16 BRPM | DIASTOLIC BLOOD PRESSURE: 82 MMHG | HEART RATE: 61 BPM | SYSTOLIC BLOOD PRESSURE: 130 MMHG | OXYGEN SATURATION: 99 % | TEMPERATURE: 97 F

## 2022-11-03 DIAGNOSIS — I50.20 HFREF (HEART FAILURE WITH REDUCED EJECTION FRACTION) (HCC): ICD-10-CM

## 2022-11-03 DIAGNOSIS — E03.9 HYPOTHYROIDISM, UNSPECIFIED TYPE: Primary | ICD-10-CM

## 2022-11-03 DIAGNOSIS — E03.9 HYPOTHYROIDISM, UNSPECIFIED TYPE: ICD-10-CM

## 2022-11-03 DIAGNOSIS — I10 PRIMARY HYPERTENSION: ICD-10-CM

## 2022-11-03 DIAGNOSIS — I25.10 CAD IN NATIVE ARTERY: ICD-10-CM

## 2022-11-03 LAB — TSH SERPL DL<=0.05 MIU/L-ACNC: 1.31 UIU/ML (ref 0.27–4.2)

## 2022-11-03 PROCEDURE — 99214 OFFICE O/P EST MOD 30 MIN: CPT | Performed by: FAMILY MEDICINE

## 2022-11-03 PROCEDURE — 36415 COLL VENOUS BLD VENIPUNCTURE: CPT

## 2022-11-03 PROCEDURE — 3074F SYST BP LT 130 MM HG: CPT | Performed by: FAMILY MEDICINE

## 2022-11-03 PROCEDURE — 3078F DIAST BP <80 MM HG: CPT | Performed by: FAMILY MEDICINE

## 2022-11-03 PROCEDURE — 84443 ASSAY THYROID STIM HORMONE: CPT

## 2022-11-03 NOTE — PROGRESS NOTES
2022    [address]    Chief Complaint   Patient presents with    Hypothyroidism     Patient here for follow up. HPI  History was obtained from patient. Adri Orellana is a 57 y.o. female who presents today with the followin. Hypothyroidism, unspecified type    2. CAD in native artery    3. HFrEF (heart failure with reduced ejection fraction) ([address]    4. Primary hypertension    Patient is here for follow-up of coronary artery disease heart failure  And hypertension. Patient states she is taking all of her medication as prescribed. Patient denies any shortness of breath chest pain or edema in her ankles. REVIEW OF SYMPTOMS    Review of Systems   Constitutional:  Negative for chills, fatigue and fever. HENT:  Negative for congestion, mouth sores, postnasal drip, rhinorrhea and sinus pain. Eyes:  Negative for photophobia, discharge and redness. Respiratory:  Negative for cough and shortness of breath. Cardiovascular:  Negative for chest pain. Gastrointestinal: Negative. Genitourinary:  Negative for difficulty urinating, dysuria, hematuria and urgency. Neurological:  Negative for headaches. Psychiatric/Behavioral:  Negative for sleep disturbance.       PAST MEDICAL HISTORY  Past Medical History:   Diagnosis Date    Hyperlipidemia     Hypertension     Hypothyroidism 2022       FAMILY HISTORY  Family History   Problem Relation Age of Onset    Heart Failure Mother          at age 80 years, was a smoker    Atrial Fibrillation Mother     Heart Failure Father          at age 71, was a smoker    Diabetes Father     Heart Attack Sister          at age 48 years, smoked    Arthritis Sister         smoked    Diabetes type 2  Brother         smoked    Diabetes type 2  Brother         smoked    Obesity Brother     Diabetes Brother         did not smoke       SOCIAL HISTORY  Social History     Socioeconomic History    Marital status:      Spouse name: 2nd :  Mely Choi    Number of children: 0    Years of education: None    Highest education level: None   Occupational History    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 She had had her first cigarette at age 22 years, for 42 years she had averaged 1/4 PPD for 10.5 pack-years   Vaping Use    Vaping Use: Never used   Substance and Sexual Activity    Alcohol use: Yes     Comment: occasional beer    Drug use: Never    Sexual activity: Not Currently     Comment: has no kids   Social History Narrative    CODE STATUS: Full Code    HEALTH CARE PROXY: her , Mr. Mely Choi, [phone]    AMBULATES: independently    DOMICILED: has stairs in the home, uses the stairs, lives with her  and her brother, has no home pets        No caffiene     Social Determinants of Health     Financial Resource Strain: Low Risk     Difficulty of Paying Living Expenses: Not very hard   Food Insecurity: No Food Insecurity    Worried About Running Out of Food in the Last Year: Never true    [address] in the Last Year: Never true   Transportation Needs: No Transportation Needs    Lack of Transportation (Medical): No    Lack of Transportation (Non-Medical): No   Physical Activity: Sufficiently Active    Days of Exercise per Week: 7 days    Minutes of Exercise per Session: 30 min   Stress: No Stress Concern Present    Feeling of Stress : Not at all   Social Connections: Moderately Isolated    Frequency of Communication with Friends and Family: More than three times a week    Frequency of Social Gatherings with Friends and Family:  Three times a week    Attends Jewish Services: Never    Active Member of Clubs or Organizations: No    Attends Club or Organization Meetings: Never    Marital Status:    Intimate Partner Violence: Not At Risk    Fear of Current or Ex-Partner: No    Emotionally Abused: No    Physically Abused: No    Sexually Abused: No   Housing Stability: Unknown    Unable to Pay for Housing in the Last Year: No    Unstable Housing in the Last Year: No        SURGICAL HISTORY  Past Surgical History:   Procedure Laterality Date    ANKLE SURGERY Right     2012 & 2020                 CURRENT MEDICATIONS  Current Outpatient Medications   Medication Sig Dispense Refill    simvastatin (ZOCOR) 20 MG tablet Take 20 mg by mouth nightly      sacubitril-valsartan (ENTRESTO) 49-51 MG per tablet Take 1 tablet by mouth 2 times daily 180 tablet 3    empagliflozin (JARDIANCE) 10 MG tablet Take 1 tablet by mouth daily 30 tablet 5    levothyroxine (SYNTHROID) 50 MCG tablet Take 1 tablet by mouth daily 30 tablet 5    apixaban (ELIQUIS) 5 MG TABS tablet Take 1 tablet by mouth 2 times daily 180 tablet 2    furosemide (LASIX) 20 MG tablet Take 1 tablet by mouth daily 90 tablet 2    metoprolol succinate (TOPROL XL) 25 MG extended release tablet Take 1 tablet by mouth daily 90 tablet 3    spironolactone (ALDACTONE) 25 MG tablet Take 1 tablet by mouth daily 90 tablet 3     No current facility-administered medications for this visit. ALLERGIES  No Known Allergies    PHYSICAL EXAM    /82   Pulse 61   Temp 97 °F (36.1 °C)   Resp 16   Ht 5' (1.524 m)   Wt 185 lb 9.6 oz (84.2 kg)   SpO2 99%   BMI 36.25 kg/m²     Physical Exam  Vitals and nursing note reviewed. Constitutional:       Appearance: Normal appearance. HENT:      Nose: No congestion or rhinorrhea. Mouth/Throat:      Mouth: Mucous membranes are moist.      Pharynx: Oropharynx is clear. Eyes:      General: No scleral icterus. Conjunctiva/sclera: Conjunctivae normal.   Cardiovascular:      Rate and Rhythm: Normal rate and regular rhythm. Heart sounds: Normal heart sounds. Pulmonary:      Effort: Pulmonary effort is normal.      Breath sounds: Normal breath sounds. Musculoskeletal:      Cervical back: Neck supple. Skin:     General: Skin is warm. Neurological:      Mental Status: She is alert and oriented to person, place, and time. Psychiatric:         Mood and Affect: Mood normal.       ASSESSMENT & Awakaylie Escalante was seen today for hypothyroidism. Diagnoses and all orders for this visit:    Hypothyroidism, unspecified type  -     TSH; Future  Is currently taking levothyroxine 50 mcg daily. She will have a TSH drawn today and will be called with those results. CAD in native artery  Patient will continue with all of her blood pressure medicines along with Jardiance and Eliquis. Patient will follow up with cardiology per their instructions. HFrEF (heart failure with reduced ejection fraction) ([address]    Primary hypertension  Patient will continue with her Entresto furosemide metoprolol and spironolactone. Her blood pressure is well controlled. Return in about 4 weeks (around 12/1/2022). Electronically signed by Chai Lebron DO on 11/7/2022

## 2022-11-04 DIAGNOSIS — E03.9 HYPOTHYROIDISM, UNSPECIFIED TYPE: ICD-10-CM

## 2022-11-07 RX ORDER — LEVOTHYROXINE SODIUM 0.05 MG/1
TABLET ORAL
Qty: 90 TABLET | Refills: 1 | OUTPATIENT
Start: 2022-11-07

## 2022-11-07 ASSESSMENT — ENCOUNTER SYMPTOMS
EYE REDNESS: 0
SINUS PAIN: 0
RHINORRHEA: 0
EYE DISCHARGE: 0
SHORTNESS OF BREATH: 0
GASTROINTESTINAL NEGATIVE: 1
PHOTOPHOBIA: 0
COUGH: 0

## 2022-11-10 ENCOUNTER — OFFICE VISIT (OUTPATIENT)
Dept: ONCOLOGY | Age: 62
End: 2022-11-10

## 2022-11-10 ENCOUNTER — HOSPITAL ENCOUNTER (OUTPATIENT)
Dept: INFUSION THERAPY | Age: 62
Discharge: HOME | End: 2022-11-10

## 2022-11-10 VITALS
SYSTOLIC BLOOD PRESSURE: 151 MMHG | BODY MASS INDEX: 36.97 KG/M2 | OXYGEN SATURATION: 100 % | TEMPERATURE: 96.9 F | HEART RATE: 69 BPM | HEIGHT: 60 IN | DIASTOLIC BLOOD PRESSURE: 77 MMHG | WEIGHT: 188.3 LBS

## 2022-11-10 DIAGNOSIS — D64.9 ANEMIA, UNSPECIFIED TYPE: ICD-10-CM

## 2022-11-10 DIAGNOSIS — D64.9 ANEMIA, UNSPECIFIED TYPE: Primary | ICD-10-CM

## 2022-11-10 PROCEDURE — 86334 IMMUNOFIX E-PHORESIS SERUM: CPT

## 2022-11-10 PROCEDURE — 84165 PROTEIN E-PHORESIS SERUM: CPT

## 2022-11-10 PROCEDURE — 36415 COLL VENOUS BLD VENIPUNCTURE: CPT

## 2022-11-10 PROCEDURE — 99212 OFFICE O/P EST SF 10 MIN: CPT

## 2022-11-10 NOTE — PROGRESS NOTES
[address]  [facility]   Hematology/Oncology  Consult      Patient Name: Eben Zamora  YOB: 1960  PCP: Bubba Reno. Emerald Early DO   Referring Provider:      Reason for Consultation:   Chief Complaint   Patient presents with    Follow-up     SHAUNNA      Interval history: No new complaints. History of Present Illness:  57years old female referred by Dr. Maggie Weaver, for abnormal SPEP. She was admitted in August 2022 with congestive heart failure and NSTEMI. Found to have coronary artery disease, EF of 10%, left atrial thrombus. On GDMT and Eliquis and LifeVest.  Serum immunofixation showed monoclonal IgG G kappa. M spike 2.4. Serum free kappa was 22, lambda 14 with a normal ratio of 1.5. Random UPEP also showed monoclonal IgG kappa. Chemistry from 2 weeks ago show creatinine of 1.2, calcium 9.4. No recent hemoglobin available. Patient denies any bony pain. Cardiac MRI has been ordered to rule out cardiac amyloidosis. Review of systems: Over 10 systems were reviewed and all were negative except as mentioned above.     Diagnostic Data:     Past Medical History:   Diagnosis Date    Hyperlipidemia     Hypertension     Hypothyroidism 9/18/2022       Patient Active Problem List    Diagnosis Date Noted    Obesity (BMI 35.0-39.9 without comorbidity) 10/26/2022    Primary hypertension 10/15/2022    Iron deficiency anemia, unspecified 10/10/2022    Iron deficiency 09/19/2022    Class 2 obesity due to excess calories without serious comorbidity with body mass index (BMI) of 35.0 to 35.9 in adult 09/19/2022    HFrEF (heart failure with reduced ejection fraction) ([facility] 09/19/2022    LV (left ventricular) mural thrombus 09/19/2022    Hypothyroidism 09/18/2022    CAD in native artery 08/29/2022    NICM (nonischemic cardiomyopathy) ([address] 08/29/2022    NSTEMI (non-ST elevated myocardial infarction) ([facility] 08/26/2022        Past Surgical History:   Procedure Laterality Date    ANKLE SURGERY Right      &        Family History  Family History   Problem Relation Age of Onset    Heart Failure Mother          at age 80 years, was a smoker    Atrial Fibrillation Mother     Heart Failure Father          at age 71, was a smoker    Diabetes Father     Heart Attack Sister          at age 48 years, smoked    Arthritis Sister         smoked    Diabetes type 2  Brother         smoked    Diabetes type 2  Brother         smoked    Obesity Brother     Diabetes Brother         did not smoke       Social History    TOBACCO:   reports that she quit smoking about 10 months ago. Her smoking use included cigarettes. She started smoking about 43 years ago. She has a 10.50 pack-year smoking history. She has never used smokeless tobacco.  ETOH:   reports current alcohol use. Home Medications  Prior to Admission medications    Medication Sig Start Date End Date Taking?  Authorizing Provider   simvastatin (ZOCOR) 20 MG tablet Take 20 mg by mouth nightly   Yes Historical Provider, MD   sacubitril-valsartan (ENTRESTO) 49-51 MG per tablet Take 1 tablet by mouth 2 times daily 10/21/22  Yes CHELSEA Alberts CNP   empagliflozin (JARDIANCE) 10 MG tablet Take 1 tablet by mouth daily 22  Yes CHELSEA Alberts CNP   levothyroxine (SYNTHROID) 50 MCG tablet Take 1 tablet by mouth daily 9/15/22  Yes Rober Rios DO   apixaban (ELIQUIS) 5 MG TABS tablet Take 1 tablet by mouth 2 times daily 22  Yes Venkat Rouse MD   furosemide (LASIX) 20 MG tablet Take 1 tablet by mouth daily 22  Yes Venkat Rouse MD   metoprolol succinate (TOPROL XL) 25 MG extended release tablet Take 1 tablet by mouth daily 22  Yes Venkat Rouse MD   spironolactone (ALDACTONE) 25 MG tablet Take 1 tablet by mouth daily 22  Yes Venkat Rouse MD   amLODIPine (NORVASC) 10 MG tablet Take 20 mg by mouth daily  22  Historical Provider, MD       Allergies  No Known Allergies    Review of Systems:      Objective  BP (!) 151/77   Pulse 69   Temp 96.9 °F (36.1 °C)   Ht 5' (1.524 m)   Wt 188 lb 4.8 oz (85.4 kg)   SpO2 100%   BMI 36.77 kg/m²     Physical Performance Status    Physical Exam:  General: AAO to person, place, time, and purpose, appears stated age, cooperative, no acute distress, pleasant   Head and neck : PERRLA, EOMI . Sclera non icteric. Oropharynx : Clear  Neck: no JVD,  no adenopathy, no carotid bruit  LYMPHATICS : No peripheral lymphadenopathy. Lungs: Clear to auscultation   Heart: Regular rate and regular rhythm, no murmur, normal S1, S2  Abdomen: Soft, non-tender;no masses, no organomegaly  Extremities: No edema,no cyanosis, no clubbing. Skin:  No rash. Neurologic:Cranial nerves grossly intact. No focal motor or sensory deficits .     Recent Laboratory Data-   Lab Results   Component Value Date    WBC 5.9 10/27/2022    HGB 11.9 10/27/2022    HCT 37.4 10/27/2022    MCV 87.0 10/27/2022     10/27/2022    LYMPHOPCT 34.9 10/27/2022    RBC 4.30 10/27/2022    MCH 27.7 10/27/2022    MCHC 31.8 (L) 10/27/2022    RDW 18.1 (H) 10/27/2022    NEUTOPHILPCT 51.4 10/27/2022    MONOPCT 10.7 10/27/2022    BASOPCT 0.8 10/27/2022    NEUTROABS 3.04 10/27/2022    LYMPHSABS 2.06 10/27/2022    MONOSABS 0.63 10/27/2022    EOSABS 0.12 10/27/2022    BASOSABS 0.05 10/27/2022       Lab Results   Component Value Date     10/27/2022    K 5.1 (H) 10/27/2022     10/27/2022    CO2 26 10/27/2022    BUN 41 (H) 10/27/2022    CREATININE 1.3 (H) 10/27/2022    GLUCOSE 121 (H) 10/27/2022    CALCIUM 9.3 10/27/2022    PROT 9.5 (H) 10/27/2022    LABALBU 3.9 10/27/2022    BILITOT 0.2 10/27/2022    ALKPHOS 89 10/27/2022    AST 18 10/27/2022    ALT 18 10/27/2022    LABGLOM 46 10/27/2022    GFRAA 55 10/14/2022       Lab Results   Component Value Date    IRON 35 (L) 10/27/2022    TIBC 420 10/27/2022    FERRITIN 31 10/27/2022           Radiology-    No results found.      ASSESSMENT/PLAN :    Imelda Keith was seen today for follow-up. Diagnoses and all orders for this visit:    Anemia, unspecified type  -     Electrophoresis Protein, Serum; Future  57years old female with congestive heart failure, coronary artery disease, atrial thrombus, on GDMT, Eliquis LifeVest with monoclonal IgG kappa and serum and urine. Bone marrow biopsy and aspiration and skeletal survey were requested considering an M spike of 2.4. Has not been scheduled yet. We will give patient number to call and schedule the biopsy and skeletal survey. Serum free light light chains show worsening serum free kappa to 80 from 22 a month ago with a ratio 4.87. 24-hour UPEP showed total protein of 74 mg with an M spike of 2.2 mg. Hemoglobin, creatinine, calcium normal.  No bone pain. Cardiac MRI has been ordered by Dr. Sukh Marlow to rule out cardiac amyloidosis. Blood test from last month show low normal ferritin of 44, iron saturation of 11 TIBC of 400. We will repeat iron panel. Return to clinic in 2 weeks. Return in about 2 weeks (around 11/24/2022).      Danny Quinn MD   Electronically signed 11/10/2022 at 11:14 AM

## 2022-11-11 ENCOUNTER — TELEPHONE (OUTPATIENT)
Dept: CARDIOLOGY CLINIC | Age: 62
End: 2022-11-11

## 2022-11-11 LAB
ALBUMIN SERPL-MCNC: 3.5 G/DL (ref 3.5–4.7)
ALPHA-1-GLOBULIN: 0.3 G/DL (ref 0.2–0.4)
ALPHA-2-GLOBULIN: 0.9 G/DL (ref 0.5–1)
BETA GLOBULIN: 1.6 G/DL (ref 0.8–1.3)
ELECTROPHORESIS: ABNORMAL
GAMMA GLOBULIN: 3.4 G/DL (ref 0.7–1.6)
IMMUNOFIXATION RESULT, SERUM: NORMAL
TOTAL PROTEIN: 9.7 G/DL (ref 6.4–8.3)

## 2022-11-11 NOTE — TELEPHONE ENCOUNTER
Spoke with patient regarding [facility] Patient Assistance Program she is in the process of applying for medicaid. She asked for samples of Entresto 49-51 mg to get her through until delivery on 11/23/22 and Jardiance 10 mg until approved. Advised her they were ready for  today.      Gave her on 11/11/22  Entresto 49-51 mg   Lot#: [id number]  Exp: 11/24  2 bottles of 28 tabs (56)    Jardiance 10 mg   Lot# [id number]  Ep\"6/24  2 bottles of 7 tabs (14)

## 2022-11-14 ENCOUNTER — HOSPITAL ENCOUNTER (OUTPATIENT)
Dept: OTHER | Age: 62
Setting detail: THERAPIES SERIES
Discharge: HOME | End: 2022-11-14

## 2022-11-14 VITALS
RESPIRATION RATE: 18 BRPM | SYSTOLIC BLOOD PRESSURE: 140 MMHG | BODY MASS INDEX: 36.91 KG/M2 | DIASTOLIC BLOOD PRESSURE: 80 MMHG | HEIGHT: 60 IN | OXYGEN SATURATION: 99 % | HEART RATE: 65 BPM | WEIGHT: 188 LBS

## 2022-11-14 DIAGNOSIS — I50.20 HFREF (HEART FAILURE WITH REDUCED EJECTION FRACTION) (HCC): Primary | ICD-10-CM

## 2022-11-14 LAB
ANION GAP SERPL CALCULATED.3IONS-SCNC: 13 MMOL/L (ref 7–16)
BUN BLDV-MCNC: 38 MG/DL (ref 6–23)
CALCIUM SERPL-MCNC: 9.1 MG/DL (ref 8.6–10.2)
CHLORIDE BLD-SCNC: 93 MMOL/L (ref 98–107)
CO2: 24 MMOL/L (ref 22–29)
CREAT SERPL-MCNC: 1.3 MG/DL (ref 0.5–1)
GFR SERPL CREATININE-BSD FRML MDRD: 46 ML/MIN/1.73
GLUCOSE BLD-MCNC: 104 MG/DL (ref 74–99)
POTASSIUM SERPL-SCNC: 4.6 MMOL/L (ref 3.5–5)
PRO-BNP: 129 PG/ML (ref 0–125)
SODIUM BLD-SCNC: 130 MMOL/L (ref 132–146)

## 2022-11-14 PROCEDURE — 99214 OFFICE O/P EST MOD 30 MIN: CPT

## 2022-11-14 PROCEDURE — 80048 BASIC METABOLIC PNL TOTAL CA: CPT

## 2022-11-14 PROCEDURE — 36415 COLL VENOUS BLD VENIPUNCTURE: CPT

## 2022-11-14 PROCEDURE — 83880 ASSAY OF NATRIURETIC PEPTIDE: CPT

## 2022-11-14 RX ORDER — FUROSEMIDE 20 MG/1
20 TABLET ORAL DAILY PRN
Qty: 90 TABLET | Refills: 2 | Status: SHIPPED | OUTPATIENT
Start: 2022-11-14

## 2022-11-14 NOTE — PROGRESS NOTES
Labs and CHF clinic note reviewed  BP (!) 140/80  Pulse 65     Current GDMT:  Entresto 49/51 mg BID  Toprol 25 mg daily (limited by HR)  Spironolactone 25 mg daily   Jardiance 10 mg daily    Lasix 20 mg daily      Spoke with Rocío Moyer RN in CHF clinic  Instructed to increase Entresto to 97/103 mg BID  Change lasix to PRN  Follow up labs in 1 week

## 2022-11-14 NOTE — PROGRESS NOTES
Congestive Heart Failure [facility]     TITRATION PATIENT     Cheri Sanchez   1960    Referring Provider: Dr. Cheo Hauser   Primary Care Physician: Dr. Sabine Holden DO  Cardiologist: Dr. Cheo Hauser / Mark TRAN- CNP  Nephrologist: N/A    History of Present Illness:   Cheri Sanchez is a 57 y.o. female with a history of HFrEF, most recent EF 10% 8/27/2022. Currently wearing a lifevest. Denies any alarms or alerts. Patient Story:  She does not have dyspnea with exertion, shortness of breath, or decline in overall functional capacity. She does not have orthopnea, PND, nocturnal cough or hemoptysis. She does not have abdominal distention or bloating, early satiety, anorexia/change in appetite. She does has a good urinary response to oral diuretic. Pt has been drinking 4-6 (16 oz) of fluid a day. Reinforced 64oz daily. She does not have lower extremity edema. She denies lightheadedness, dizziness. She denies palpitations, syncope or near syncope. She does not complain of chest pain, pressure, discomfort.        No Known Allergies    Current Outpatient Medications on File Prior to Encounter   Medication Sig Dispense Refill    simvastatin (ZOCOR) 20 MG tablet Take 20 mg by mouth nightly      sacubitril-valsartan (ENTRESTO) 49-51 MG per tablet Take 1 tablet by mouth 2 times daily 180 tablet 3    empagliflozin (JARDIANCE) 10 MG tablet Take 1 tablet by mouth daily 30 tablet 5    levothyroxine (SYNTHROID) 50 MCG tablet Take 1 tablet by mouth daily 30 tablet 5    apixaban (ELIQUIS) 5 MG TABS tablet Take 1 tablet by mouth 2 times daily 180 tablet 2    furosemide (LASIX) 20 MG tablet Take 1 tablet by mouth daily 90 tablet 2    metoprolol succinate (TOPROL XL) 25 MG extended release tablet Take 1 tablet by mouth daily 90 tablet 3    spironolactone (ALDACTONE) 25 MG tablet Take 1 tablet by mouth daily 90 tablet 3    [DISCONTINUED] amLODIPine (NORVASC) 10 MG tablet Take 20 mg by mouth daily       No current facility-administered medications on file prior to encounter. Guideline directed medical:  ARNI/ACE I/ARB: Yes entresto 24/26 mg BID  Beta blocker:  Yes metoprolol succinate 25 mg daily   Aldosterone antagonist:  Yes spirolactone 25 mg daily   SGLT2i:  Yes jardiance 10 mg daily    Physical Examination:     BP (!) 140/80 Comment: pt took her meds at 060 am  Pulse 65   Resp 18   Ht 5' (1.524 m)   Wt 188 lb (85.3 kg)   SpO2 99%   BMI 36.72 kg/m²     Assessment  Charting Type: Shift assessment (CHF clinic  NYHA I)    Neurological  Level of Consciousness: Alert (0)    Respiratory  Respiratory Pattern: Regular  Respiratory Depth: Normal  Respiratory Quality/Effort: Unlabored  Chest Assessment: Chest expansion symmetrical  L Breath Sounds: Clear  R Breath Sounds: Clear    Cough/Sputum  Cough: None    Cardiac  Cardiac Regularity: Regular  Heart Sounds: S1, S2  Cardiac Rhythm: Sinus rhythm    Rhythm Interpretation  Heart Rate: 65    Gastrointestinal  Abdominal (WDL): Within Defined Limits  Abdomen Inspection: Soft  RUQ Bowel Sounds: Active  LUQ Bowel Sounds: Active  RLQ Bowel Sounds: Active  LLQ Bowel Sounds: Active     Bowel Sounds  RUQ Bowel Sounds: Active  LUQ Bowel Sounds: Active  RLQ Bowel Sounds: Active  LLQ Bowel Sounds:  Active    Peripheral Vascular  Peripheral Vascular (WDL): Within Defined Limits  Edema: Right lower extremity, Left lower extremity  RLE Edema: None (naveed hose are on)  LLE Edema: None (teds are on)    Genitourinary  Genitourinary (WDL): Within Defined Limits    Heart Rate: 65      LAB DATA:    Last 3 BMP      Sodium (mmol/L)   Date Value   11/14/2022 130 (L)   10/27/2022 134   10/14/2022 133     Potassium (mmol/L)   Date Value   11/14/2022 4.6   10/27/2022 5.1 (H)   10/14/2022 4.8     Potassium reflex Magnesium (mmol/L)   Date Value   08/29/2022 3.1 (L)   08/27/2022 3.9   08/26/2022 4.0     Chloride (mmol/L)   Date Value   11/14/2022 93 (L) 10/27/2022 100   10/14/2022 99     CO2 (mmol/L)   Date Value   11/14/2022 24   10/27/2022 26   10/14/2022 25     BUN (mg/dL)   Date Value   11/14/2022 38 (H)   10/27/2022 41 (H)   10/14/2022 33 (H)     Glucose (mg/dL)   Date Value   11/14/2022 104 (H)   10/27/2022 121 (H)   10/14/2022 107 (H)     Calcium (mg/dL)   Date Value   11/14/2022 9.1   10/27/2022 9.3   10/14/2022 9.4       Last 3 BNP       Pro-BNP (pg/mL)   Date Value   11/14/2022 129 (H)   10/28/2022 176 (H)   10/14/2022 479 (H)      CBC:   No results for input(s): WBC, HGB, PLT in the last 72 hours. BMP:    Recent Labs     11/14/22  1006   *   K 4.6   CL 93*   CO2 24   BUN 38*   CREATININE 1.3*   GLUCOSE 104*       Hepatic:   No results for input(s): AST, ALT, ALB, BILITOT, ALKPHOS in the last 72 hours. Troponin: No results for input(s): TROPONINI in the last 72 hours. BNP: No results for input(s): BNP in the last 72 hours. Lipids: No results for input(s): CHOL, HDL in the last 72 hours. Invalid input(s): LDLCALCU  INR: No results for input(s): INR in the last 72 hours. WEIGHTS:    Wt Readings from Last 3 Encounters:   11/14/22 188 lb (85.3 kg)   11/10/22 188 lb 4.8 oz (85.4 kg)   11/03/22 185 lb 9.6 oz (84.2 kg)     TELEMETRY:  Cardiac Regularity: Regular  Cardiac Rhythm/Interpretation: SR    ASSESSMENT:  Mike Purdy presents for CHF clinic follow up with increase weight of 3 lbs since the CHF clinic visit on 10/28. Lungs are clear, abdomen is soft, and there is no edema present in BLE. Denies any SOB, JACOBO or orthopnea. Pt feels great. Patient is weighting herself daily and states overall stables weights. Dr. Carlitos Marquez ordered a Cardiac MRI to be done on 12/4. Follow up in one month.      Interventions completed this visit:  IV diuretics given no  Lab work obtained yes, BNP BMP  Reviewed currently prescribed medications with patient, educated on importance of compliance and answered any questions regarding their medication  Educated on low sodium diet    PLAN:  Scheduled to follow up in CHF clinic on   Future Appointments   Date Time Provider Arcadio Phipps   11/29/2022  1:00 PM [place] RM 1 8 [facility]   11/30/2022  9:00 AM [address] [place] [address] [address]   12/1/2022 10:15 AM DO Jocelyne Wolfe [facility]   12/1/2022 11:45 AM Senait Bergman MD Blood Cancer [facility]   12/4/2022  9:00 AM [facility] MRI RM 1 SE[facility] MRI [facility]   12/16/2022 10:00 AM [facility] CHF ROOM 1 [address]     Given clinic phone number and aware of signs and symptoms to call with any HF change in symptoms.

## 2022-11-14 NOTE — PROGRESS NOTES
12:30 PM 11/14/2022     Spoke with Ronal BRONSON re: patient VS and current lab work. Received orders:     Increase to high dose of ( Entresto  mg). Instructed patient to take Entresto 49-51mg ( x2 pills in the AM and x2 in the evening until script runs out)   Change Lasix to PRN. Labs within one week. [facility] script sent -- [facility] [place]     12:41 PM   Spoke with Leann Drop: re medication changes. I have reviewed the provider's instructions with the patient, answering all questions to her satisfaction.         Future Appointments   Date Time Provider Arcadio Phipps   11/29/2022  1:00 PM [facility] RM 1 SEYZ MOBILE [facility] Radiolo   11/30/2022  9:00 AM [facility] LABS ROOM MEDICAL ONCOLOGY [facility] MED ONC Khushbu Wan   12/1/2022 10:15 AM Leopoldo Leak, DO [place] [facility]   12/1/2022 11:45 AM Sergey Rae MD Blood Cancer [facility]   12/4/2022  9:00 AM [facility] MRI RM 1 SE[facility] MRI [facility] Radiolo   12/16/2022 10:00 AM [facility] CHF ROOM 1 [facility] CHF Eddi Amin RN

## 2022-11-29 ENCOUNTER — HOSPITAL ENCOUNTER (OUTPATIENT)
Dept: MAMMOGRAPHY | Age: 62
Discharge: HOME | End: 2022-12-01

## 2022-11-29 VITALS — HEIGHT: 60 IN | WEIGHT: 188 LBS | BODY MASS INDEX: 36.91 KG/M2

## 2022-11-29 DIAGNOSIS — Z12.31 ENCOUNTER FOR SCREENING MAMMOGRAM FOR MALIGNANT NEOPLASM OF BREAST: ICD-10-CM

## 2022-11-29 PROCEDURE — 77063 BREAST TOMOSYNTHESIS BI: CPT

## 2022-11-30 ENCOUNTER — TELEPHONE (OUTPATIENT)
Dept: CARDIOLOGY CLINIC | Age: 62
End: 2022-11-30

## 2022-11-30 ENCOUNTER — HOSPITAL ENCOUNTER (OUTPATIENT)
Age: 62
Discharge: HOME | End: 2022-11-30

## 2022-11-30 DIAGNOSIS — I50.20 HFREF (HEART FAILURE WITH REDUCED EJECTION FRACTION) (HCC): ICD-10-CM

## 2022-11-30 LAB
ANION GAP SERPL CALCULATED.3IONS-SCNC: 5 MMOL/L (ref 7–16)
BUN BLDV-MCNC: 19 MG/DL (ref 6–23)
CALCIUM SERPL-MCNC: 9.3 MG/DL (ref 8.6–10.2)
CHLORIDE BLD-SCNC: 100 MMOL/L (ref 98–107)
CO2: 28 MMOL/L (ref 22–29)
CREAT SERPL-MCNC: 1 MG/DL (ref 0.5–1)
GFR SERPL CREATININE-BSD FRML MDRD: >60 ML/MIN/1.73
GLUCOSE BLD-MCNC: 142 MG/DL (ref 74–99)
POTASSIUM SERPL-SCNC: 4.9 MMOL/L (ref 3.5–5)
PRO-BNP: 64 PG/ML (ref 0–125)
SODIUM BLD-SCNC: 133 MMOL/L (ref 132–146)

## 2022-11-30 PROCEDURE — 36415 COLL VENOUS BLD VENIPUNCTURE: CPT

## 2022-11-30 PROCEDURE — 80048 BASIC METABOLIC PNL TOTAL CA: CPT

## 2022-11-30 PROCEDURE — 83880 ASSAY OF NATRIURETIC PEPTIDE: CPT

## 2022-11-30 NOTE — TELEPHONE ENCOUNTER
----- Message from CHELSEA Kelly - CNP sent at 11/30/2022  1:19 PM EST -----  Labs reviewed       Current GDMT:  Entresto 97/103 mg BID  Toprol 25 mg daily (limited by HR)  Spironolactone 25 mg daily   Jardiance 10 mg daily    Lasix 20 mg daily PRN        Please let her know that labs were reviewed, stable  On goal tolerated GDMT  Continue current management  Follow up with CHF clinic as scheduled - sooner if needed please call    Thank you

## 2022-11-30 NOTE — RESULT ENCOUNTER NOTE
Labs reviewed       Current GDMT:  Entresto 97/103 mg BID  Toprol 25 mg daily (limited by HR)  Spironolactone 25 mg daily   Jardiance 10 mg daily    Lasix 20 mg daily PRN        Please let her know that labs were reviewed, stable  On goal tolerated GDMT  Continue current management  Follow up with CHF clinic as scheduled - sooner if needed please call    Thank you

## 2022-12-01 ENCOUNTER — OFFICE VISIT (OUTPATIENT)
Dept: FAMILY MEDICINE CLINIC | Age: 62
End: 2022-12-01

## 2022-12-01 VITALS
HEIGHT: 60 IN | HEART RATE: 69 BPM | TEMPERATURE: 97.6 F | BODY MASS INDEX: 37.85 KG/M2 | OXYGEN SATURATION: 96 % | DIASTOLIC BLOOD PRESSURE: 76 MMHG | RESPIRATION RATE: 18 BRPM | WEIGHT: 192.8 LBS | SYSTOLIC BLOOD PRESSURE: 128 MMHG

## 2022-12-01 DIAGNOSIS — I25.10 CAD IN NATIVE ARTERY: Primary | ICD-10-CM

## 2022-12-01 DIAGNOSIS — E03.9 HYPOTHYROIDISM, UNSPECIFIED TYPE: ICD-10-CM

## 2022-12-01 DIAGNOSIS — I10 PRIMARY HYPERTENSION: ICD-10-CM

## 2022-12-01 PROCEDURE — 90471 IMMUNIZATION ADMIN: CPT | Performed by: FAMILY MEDICINE

## 2022-12-01 PROCEDURE — 3074F SYST BP LT 130 MM HG: CPT | Performed by: FAMILY MEDICINE

## 2022-12-01 PROCEDURE — 90677 PCV20 VACCINE IM: CPT | Performed by: FAMILY MEDICINE

## 2022-12-01 PROCEDURE — 3078F DIAST BP <80 MM HG: CPT | Performed by: FAMILY MEDICINE

## 2022-12-01 PROCEDURE — 99214 OFFICE O/P EST MOD 30 MIN: CPT | Performed by: FAMILY MEDICINE

## 2022-12-01 RX ORDER — LEVOTHYROXINE SODIUM 0.05 MG/1
50 TABLET ORAL DAILY
Qty: 90 TABLET | Refills: 1 | Status: SHIPPED | OUTPATIENT
Start: 2022-12-01

## 2022-12-01 ASSESSMENT — ENCOUNTER SYMPTOMS
EYE DISCHARGE: 0
SINUS PAIN: 0
GASTROINTESTINAL NEGATIVE: 1
RHINORRHEA: 0
PHOTOPHOBIA: 0
SHORTNESS OF BREATH: 0
COUGH: 0
EYE REDNESS: 0

## 2022-12-01 NOTE — PROGRESS NOTES
2022    [address]    Chief Complaint   Patient presents with    Hypothyroidism     Patient here for follow up. Diabetes       HPI  History was obtained from patient. Adri Orellana is a 57 y.o. female who presents today with the followin. CAD in native artery    2. Primary hypertension    3. Hypothyroidism, unspecified type    Patient is here for follow-up of coronary artery disease heart failure hypertension hypothyroidism. Patient states she is taking all of her medications as prescribed. Patient still has not gotten her Jardiance from [employer]. But she is getting samples from the cardiology office. Patient has no complaints today. She denies any chest pain or shortness of breath. REVIEW OF SYMPTOMS    Review of Systems   Constitutional:  Negative for chills, fatigue and fever. HENT:  Negative for congestion, mouth sores, postnasal drip, rhinorrhea and sinus pain. Eyes:  Negative for photophobia, discharge and redness. Respiratory:  Negative for cough and shortness of breath. Cardiovascular:  Negative for chest pain. Gastrointestinal: Negative. Endocrine: Negative for polydipsia and polyuria. Genitourinary:  Negative for difficulty urinating, dysuria, hematuria and urgency. Neurological:  Negative for headaches. Psychiatric/Behavioral:  Negative for sleep disturbance.       PAST MEDICAL HISTORY  Past Medical History:   Diagnosis Date    Hyperlipidemia     Hypertension     Hypothyroidism 2022       FAMILY HISTORY  Family History   Problem Relation Age of Onset    Heart Failure Mother          at age 80 years, was a smoker    Atrial Fibrillation Mother     Heart Failure Father          at age 71, was a smoker    Diabetes Father     Heart Attack Sister          at age 48 years, smoked    Arthritis Sister         smoked    Diabetes type 2  Brother         smoked    Diabetes type 2  Brother         smoked    Obesity Brother     Diabetes Brother         did not smoke       SOCIAL HISTORY  Social History     Socioeconomic History    Marital status:      Spouse name: 2nd : Mr. 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 She had had her first cigarette at age 22 years, for 42 years she had averaged 1/4 PPD for 10.5 pack-years   Vaping Use    Vaping Use: Never used   Substance and Sexual Activity    Alcohol use: Yes     Comment: occasional beer    Drug use: Never    Sexual activity: Not Currently     Comment: has no kids   Social History Narrative    CODE STATUS: Full Code    HEALTH CARE PROXY: her , Mr. Candace Schlatter, [phone]    AMBULATES: independently    DOMICILED: has stairs in the home, uses the stairs, lives with her  and her brother, has no home pets        No caffiene     Social Determinants of Health     Financial Resource Strain: Low Risk     Difficulty of Paying Living Expenses: Not very hard   Food Insecurity: No Food Insecurity    Worried About Running Out of Food in the Last Year: Never true    [address] in the Last Year: Never true   Transportation Needs: No Transportation Needs    Lack of Transportation (Medical): No    Lack of Transportation (Non-Medical): No   Physical Activity: Sufficiently Active    Days of Exercise per Week: 7 days    Minutes of Exercise per Session: 30 min   Stress: No Stress Concern Present    Feeling of Stress : Not at all   Social Connections:  Moderately Isolated    Frequency of Communication with Friends and Family: More than three times a week    Frequency of Social Gatherings with Friends and Family: Three times a week    Attends [employer] Services: Never    Active Member of Clubs or Organizations: No    Attends Club or Organization Meetings: Never    Marital Status:    Intimate Partner Violence: Not At Risk    Fear of Current or Ex-Partner: No    Emotionally Abused: No    Physically Abused: No    Sexually Abused: No   Housing Stability: Unknown    Unable to Pay for Housing in the Last Year: No    Unstable Housing in the Last Year: No        SURGICAL HISTORY  Past Surgical History:   Procedure Laterality Date    ANKLE SURGERY Right     2012 & 2020                 CURRENT MEDICATIONS  Current Outpatient Medications   Medication Sig Dispense Refill    levothyroxine (SYNTHROID) 50 MCG tablet Take 1 tablet by mouth daily 90 tablet 1    furosemide (LASIX) 20 MG tablet Take 1 tablet by mouth daily as needed (Weight gain, shortness of breath, swelling) 90 tablet 2    sacubitril-valsartan (ENTRESTO)  MG per tablet Take 1 tablet by mouth 2 times daily 60 tablet 3    simvastatin (ZOCOR) 20 MG tablet Take 20 mg by mouth nightly      empagliflozin (JARDIANCE) 10 MG tablet Take 1 tablet by mouth daily 30 tablet 5    apixaban (ELIQUIS) 5 MG TABS tablet Take 1 tablet by mouth 2 times daily 180 tablet 2    metoprolol succinate (TOPROL XL) 25 MG extended release tablet Take 1 tablet by mouth daily 90 tablet 3    spironolactone (ALDACTONE) 25 MG tablet Take 1 tablet by mouth daily 90 tablet 3     No current facility-administered medications for this visit. ALLERGIES  No Known Allergies    PHYSICAL EXAM    /76   Pulse 69   Temp 97.6 °F (36.4 °C)   Resp 18   Ht 5' (1.524 m)   Wt 192 lb 12.8 oz (87.5 kg)   SpO2 96%   BMI 37.65 kg/m²     Physical Exam  Vitals and nursing note reviewed. Constitutional:       Appearance: Normal appearance. She is obese. HENT:      Head: Normocephalic and atraumatic.    Eyes: Conjunctiva/sclera: Conjunctivae normal.   Neurological:      Mental Status: She is alert and oriented to person, place, and time. Psychiatric:         Mood and Affect: Mood normal.       CAROL & Bcekylyndsay Jiang was seen today for hypothyroidism and diabetes. Diagnoses and all orders for this visit:    CAD in native artery  Patient will continue to follow with cardiology and heart failure clinic. Primary hypertension  Patient's blood pressure is well controlled on current medications including Entresto Lasix and metoprolol and spironolactone. Hypothyroidism, unspecified type  Patient will continue with Synthroid 50 mcg daily. Her TSH last month was within normal limits. Return in about 3 months (around 3/1/2023). Electronically signed by Ele Rider.  Verena Galindo DO on 12/5/2022

## 2022-12-06 ENCOUNTER — HOSPITAL ENCOUNTER (OUTPATIENT)
Dept: PREADMISSION TESTING | Age: 62
Discharge: HOME | End: 2022-12-06

## 2022-12-06 ENCOUNTER — TELEPHONE (OUTPATIENT)
Dept: CARDIOLOGY CLINIC | Age: 62
End: 2022-12-06

## 2022-12-06 RX ORDER — SODIUM CHLORIDE 0.9 % (FLUSH) 0.9 %
5-40 SYRINGE (ML) INJECTION 2 TIMES DAILY
OUTPATIENT
Start: 2022-12-06

## 2022-12-06 NOTE — PROGRESS NOTES
Lala Lopez in specials notified that pt is presently wearing a life vest. He will find out if procedure will proceed as scheduled. Pt aware- states she is waiting for call from cardiologist re: removing as she thought she was only to wear for 90 days.

## 2022-12-06 NOTE — PROGRESS NOTES
[address]                                                                                                                    PRE OP INSTRUCTIONS FOR  Marcia Sandhu        Date: 12/6/2022    Date of surgery: 12/7/22   Arrival Time: 9 am    Nothing by mouth (NPO) as instructed. __clear liquids up to 4 hours prior nothing solid after midnight__________________________________________________________________    Take the following medications with a small sip of water on the morning of Surgery: AM meds except diuretics and eliquis     Diabetics may take evening dose of insulin but none after midnight. If you feel symptomatic or low blood sugar morning of surgery drink 1-2 ounces of apple juice only. Aspirin, Ibuprofen, Advil, Naproxen, Vitamin E and other Anti-inflammatory products should be stopped  before surgery  as directed by your physician. Take Tylenol only unless instructed otherwise by your surgeon. Check with your Doctor regarding stopping Plavix, Coumadin, Lovenox, Eliquis, Effient, or other blood thinners. Do not smoke,use illicit drugs and do not drink any alcoholic beverages 24 hours prior to surgery. You may brush your teeth the morning of surgery. DO NOT SWALLOW WATER    You MUST make arrangements for a responsible adult to take you home after your surgery. You will not be allowed to leave alone or drive yourself home. It is strongly suggested someone stay with you the first 24 hrs. Your surgery will be cancelled if you do not have a ride home. PEDIATRIC PATIENTS ONLY:  A parent/legal guardian must accompany a child scheduled for surgery and plan to stay at the hospital until the child is discharged. Please do not bring other children with you.     Please wear simple, loose fitting clothing to the hospital.  Marlou Bear not bring valuables (money, credit cards, checkbooks, etc.) Do not wear any makeup (including no eye makeup) or nail polish on your fingers or toes.    DO NOT wear any jewelry or piercings on day of surgery. All body piercing jewelry must be removed. Shower the night before surgery with _x__Antibacterial soap /MAGALI WIPES________    TOTAL JOINT REPLACEMENT/HYSTERECTOMY PATIENTS ONLY---Remember to bring Blood Bank bracelet to the hospital on the day of surgery. If you have a Living Will and Durable Power of  for Healthcare, please bring in a copy. If appropriate bring crutches, inspirex, WALKER, CANE etc... Notify your Surgeon if you develop any illness between now and surgery time, cough, cold, fever, sore throat, nausea, vomiting, etc.  Please notify your surgeon if you experience dizziness, shortness of breath or blurred vision between now & the time of your surgery. If you have ___dentures, they will be removed before going to the OR; we will provide you a container. If you wear ___contact lenses or ___glasses, they will be removed; please bring a case for them. To provide excellent care visitors will be limited to 2 in the room at any given time. Please bring picture ID and insurance card. During flu season no children under the age of 15 are permitted in the hospital for the safety of all patients. Other please check in at the information desk/main lobby. Please call AMBULATORY CARE if you have any further questions.    [address] [facility]     [address]

## 2022-12-06 NOTE — PROGRESS NOTES
Received a call back from Apple Neal in [facility], per Dr. Witt Post, cardiologist, pt is not to stop eliquis or remove life vest.Call to pt to update- she voiced understanding, call to Dr. Lamont Baumgarten office with this information.

## 2022-12-06 NOTE — TELEPHONE ENCOUNTER
Tyler Perez from [facility] at [address] [facility] [address] called  today patient is scheduled for a Bone Biopsy on 12-7-22.  under conscious sedation     Ordered by Dr. Vinay Schneider      Please advise on lifevest and anticoagulants     As Dr. Rafia Olsen is on SHREYAS

## 2022-12-07 NOTE — TELEPHONE ENCOUNTER
Olvin De La Cruz please review per Dr. Guevara Alt the procedure has been postponed  for today     Please advise

## 2022-12-08 ENCOUNTER — TELEPHONE (OUTPATIENT)
Dept: CARDIOLOGY CLINIC | Age: 62
End: 2022-12-08

## 2022-12-08 NOTE — TELEPHONE ENCOUNTER
Patient called wants to know when the lifevest can be removed     MRI scheduled 12-29      Please advise

## 2022-12-16 ENCOUNTER — HOSPITAL ENCOUNTER (OUTPATIENT)
Dept: OTHER | Age: 62
Setting detail: THERAPIES SERIES
Discharge: HOME | End: 2022-12-16

## 2022-12-16 VITALS
RESPIRATION RATE: 18 BRPM | OXYGEN SATURATION: 100 % | SYSTOLIC BLOOD PRESSURE: 128 MMHG | HEART RATE: 63 BPM | BODY MASS INDEX: 37.77 KG/M2 | WEIGHT: 193.4 LBS | DIASTOLIC BLOOD PRESSURE: 70 MMHG

## 2022-12-16 LAB
ANION GAP SERPL CALCULATED.3IONS-SCNC: 10 MMOL/L (ref 7–16)
BUN BLDV-MCNC: 31 MG/DL (ref 6–23)
CALCIUM SERPL-MCNC: 9.1 MG/DL (ref 8.6–10.2)
CHLORIDE BLD-SCNC: 103 MMOL/L (ref 98–107)
CO2: 21 MMOL/L (ref 22–29)
CREAT SERPL-MCNC: 1.2 MG/DL (ref 0.5–1)
GFR SERPL CREATININE-BSD FRML MDRD: 50 ML/MIN/1.73
GLUCOSE BLD-MCNC: 118 MG/DL (ref 74–99)
POTASSIUM SERPL-SCNC: 4.4 MMOL/L (ref 3.5–5)
PRO-BNP: 51 PG/ML (ref 0–125)
SODIUM BLD-SCNC: 134 MMOL/L (ref 132–146)

## 2022-12-16 PROCEDURE — 36415 COLL VENOUS BLD VENIPUNCTURE: CPT

## 2022-12-16 PROCEDURE — 99214 OFFICE O/P EST MOD 30 MIN: CPT

## 2022-12-16 PROCEDURE — 83880 ASSAY OF NATRIURETIC PEPTIDE: CPT

## 2022-12-16 PROCEDURE — 80048 BASIC METABOLIC PNL TOTAL CA: CPT

## 2022-12-16 NOTE — RESULT ENCOUNTER NOTE
Labs and CHF clinic note reviewed  On goal tolerated GDMT  Has cardiac MRI scheduled  Following with hemoc/onc

## 2022-12-16 NOTE — PROGRESS NOTES
Congestive Heart Failure [facility]     TITRATION PATIENT     Kalia Keene   1960    Referring Provider: Dr. Rafia Olsen   Primary Care Physician: Dr. Corey Cooper DO  Cardiologist: Dr. Rafia Olsen / Annamarie TRAN- CNP  Nephrologist: N/A    History of Present Illness:   Kalia Keene is a 57 y.o. female with a history of HFrEF, most recent EF 10% 8/27/2022. Currently wearing a lifevest. She states that she had an alarm and that she was shocked. She states that she did not turn off the alert in time. ECD was checked and it was deemed non cardiac. Patient Story:  She does not have dyspnea with exertion, shortness of breath, or decline in overall functional capacity. She does not have orthopnea, PND, nocturnal cough or hemoptysis. She does not have abdominal distention or bloating, early satiety, anorexia/change in appetite. She does has a good urinary response to oral diuretic. Pt has been drinking 4-6 (16 oz) of fluid a day. Reinforced 64oz daily. She does not have lower extremity edema. She denies lightheadedness, dizziness. She denies palpitations, syncope or near syncope. She does not complain of chest pain, pressure, discomfort.        No Known Allergies    Current Outpatient Medications on File Prior to Encounter   Medication Sig Dispense Refill    levothyroxine (SYNTHROID) 50 MCG tablet Take 1 tablet by mouth daily 90 tablet 1    furosemide (LASIX) 20 MG tablet Take 1 tablet by mouth daily as needed (Weight gain, shortness of breath, swelling) 90 tablet 2    sacubitril-valsartan (ENTRESTO)  MG per tablet Take 1 tablet by mouth 2 times daily 60 tablet 3    simvastatin (ZOCOR) 20 MG tablet Take 20 mg by mouth nightly      empagliflozin (JARDIANCE) 10 MG tablet Take 1 tablet by mouth daily 30 tablet 5    apixaban (ELIQUIS) 5 MG TABS tablet Take 1 tablet by mouth 2 times daily 180 tablet 2    metoprolol succinate (TOPROL XL) 25 MG extended release tablet Take 1 tablet by mouth daily 90 tablet 3    spironolactone (ALDACTONE) 25 MG tablet Take 1 tablet by mouth daily 90 tablet 3    [DISCONTINUED] amLODIPine (NORVASC) 10 MG tablet Take 20 mg by mouth daily       No current facility-administered medications on file prior to encounter. Guideline directed medical:  ARNI/ACE I/ARB: Yes entresto 24/26 mg BID  Beta blocker:  Yes metoprolol succinate 25 mg daily   Aldosterone antagonist:  Yes spirolactone 25 mg daily   SGLT2i:  Yes jardiance 10 mg daily    Physical Examination:     /70   Pulse 63   Resp 18   Wt 193 lb 6.4 oz (87.7 kg)   SpO2 100%   BMI 37.77 kg/m²     Assessment  Charting Type: Shift assessment (CHF clinic  NYHA I)    Neurological  Level of Consciousness: Alert (0)    Respiratory  Respiratory Pattern: Regular  Respiratory Depth: Normal  Respiratory Quality/Effort: Unlabored  Chest Assessment: Chest expansion symmetrical  L Breath Sounds: Clear  R Breath Sounds: Clear    Cough/Sputum  Cough: None    Cardiac  Cardiac Regularity: Regular  Heart Sounds: S1, S2  Cardiac Rhythm: Sinus rhythm    Rhythm Interpretation  Heart Rate: 63    Gastrointestinal  Abdominal (WDL): Within Defined Limits  Abdomen Inspection: Soft  RUQ Bowel Sounds: Active  LUQ Bowel Sounds: Active  RLQ Bowel Sounds: Active  LLQ Bowel Sounds: Active     Bowel Sounds  RUQ Bowel Sounds: Active  LUQ Bowel Sounds: Active  RLQ Bowel Sounds: Active  LLQ Bowel Sounds:  Active    Peripheral Vascular  Peripheral Vascular (WDL): Within Defined Limits  Edema: Right lower extremity, Left lower extremity  RLE Edema: None  LLE Edema: None    Genitourinary  Genitourinary (WDL): Within Defined Limits    Heart Rate: 63      LAB DATA:    Last 3 BMP      Sodium (mmol/L)   Date Value   12/16/2022 134   11/30/2022 133   11/14/2022 130 (L)     Potassium (mmol/L)   Date Value   12/16/2022 4.4   11/30/2022 4.9   11/14/2022 4.6     Potassium reflex Magnesium (mmol/L)   Date Value   08/29/2022 3.1 (L)   08/27/2022 3.9   08/26/2022 4.0     Chloride (mmol/L)   Date Value   12/16/2022 103   11/30/2022 100   11/14/2022 93 (L)     CO2 (mmol/L)   Date Value   12/16/2022 21 (L)   11/30/2022 28   11/14/2022 24     BUN (mg/dL)   Date Value   12/16/2022 31 (H)   11/30/2022 19   11/14/2022 38 (H)     Glucose (mg/dL)   Date Value   12/16/2022 118 (H)   11/30/2022 142 (H)   11/14/2022 104 (H)     Calcium (mg/dL)   Date Value   12/16/2022 9.1   11/30/2022 9.3   11/14/2022 9.1       Last 3 BNP       Pro-BNP (pg/mL)   Date Value   12/16/2022 51   11/30/2022 64   11/14/2022 129 (H)      CBC:   No results for input(s): WBC, HGB, PLT in the last 72 hours. BMP:    Recent Labs     12/16/22  1050      K 4.4      CO2 21*   BUN 31*   CREATININE 1.2*   GLUCOSE 118*         Hepatic:   No results for input(s): AST, ALT, ALB, BILITOT, ALKPHOS in the last 72 hours. Troponin: No results for input(s): TROPONINI in the last 72 hours. BNP: No results for input(s): BNP in the last 72 hours. Lipids: No results for input(s): CHOL, HDL in the last 72 hours. Invalid input(s): LDLCALCU  INR: No results for input(s): INR in the last 72 hours. WEIGHTS:    Wt Readings from Last 3 Encounters:   12/16/22 193 lb 6.4 oz (87.7 kg)   11/29/22 188 lb (85.3 kg)   12/01/22 192 lb 12.8 oz (87.5 kg)     TELEMETRY:  Cardiac Regularity: Regular  Cardiac Rhythm/Interpretation: SR    ASSESSMENT:  Teresa Velasquez presents for CHF clinic follow up with increase weight of 5 lbs since the CHF clinic visit on 11/14/22   Lungs are clear, abdomen is soft, and there is no edema present in BLE. Denies any SOB, JACOBO or orthopnea. Pt feels great. Patient is weighting herself daily and states overall stables weights. She states that she has gained some weight and has an increased appetite. Dr. Sorin Pina ordered a Cardiac MRI to be done on 12/29. Follow up in one month.      Interventions completed this visit:  IV diuretics given no  Lab work obtained yes, BNP BMP  Reviewed currently prescribed medications with patient, educated on importance of compliance and answered any questions regarding their medication  Educated on low sodium diet    PLAN:  Scheduled to follow up in CHF clinic on   Future Appointments   Date Time Provider Arcadio Phipps   12/22/2022 10:45 AM [address] [place] [address] [address]   12/22/2022 11:45 AM Elena Jaramillo MD Blood Cancer [facility]   12/29/2022  9:30 AM [facility] MRI RM 1 SEY[facility] MRI [facility] Radiolo   1/16/2023 10:30 AM [facility]   3/2/2023  8:00 AM DO ALDEN Perez [facility]     Given clinic phone number and aware of signs and symptoms to call with any HF change in symptoms.

## 2022-12-22 ENCOUNTER — OFFICE VISIT (OUTPATIENT)
Dept: ONCOLOGY | Age: 62
End: 2022-12-22
Payer: MEDICAID

## 2022-12-22 ENCOUNTER — HOSPITAL ENCOUNTER (OUTPATIENT)
Dept: INFUSION THERAPY | Age: 62
Discharge: HOME | End: 2022-12-22

## 2022-12-22 VITALS
DIASTOLIC BLOOD PRESSURE: 65 MMHG | HEIGHT: 60 IN | TEMPERATURE: 97.8 F | SYSTOLIC BLOOD PRESSURE: 134 MMHG | OXYGEN SATURATION: 95 % | BODY MASS INDEX: 37.99 KG/M2 | WEIGHT: 193.5 LBS | HEART RATE: 70 BPM

## 2022-12-22 DIAGNOSIS — D64.9 ANEMIA, UNSPECIFIED TYPE: ICD-10-CM

## 2022-12-22 DIAGNOSIS — D64.9 ANEMIA, UNSPECIFIED TYPE: Primary | ICD-10-CM

## 2022-12-22 DIAGNOSIS — D47.2 MONOCLONAL GAMMOPATHIES: Primary | ICD-10-CM

## 2022-12-22 LAB
ALBUMIN SERPL-MCNC: 3.8 G/DL (ref 3.5–5.2)
ALP BLD-CCNC: 88 U/L (ref 35–104)
ALT SERPL-CCNC: 11 U/L (ref 0–32)
ANION GAP SERPL CALCULATED.3IONS-SCNC: 8 MMOL/L (ref 7–16)
AST SERPL-CCNC: 12 U/L (ref 0–31)
BASOPHILS ABSOLUTE: 0.05 E9/L (ref 0–0.2)
BASOPHILS RELATIVE PERCENT: 0.8 % (ref 0–2)
BILIRUB SERPL-MCNC: 0.2 MG/DL (ref 0–1.2)
BUN BLDV-MCNC: 41 MG/DL (ref 6–23)
CALCIUM SERPL-MCNC: 9.5 MG/DL (ref 8.6–10.2)
CHLORIDE BLD-SCNC: 103 MMOL/L (ref 98–107)
CO2: 24 MMOL/L (ref 22–29)
CREAT SERPL-MCNC: 1.1 MG/DL (ref 0.5–1)
EOSINOPHILS ABSOLUTE: 0.16 E9/L (ref 0.05–0.5)
EOSINOPHILS RELATIVE PERCENT: 2.4 % (ref 0–6)
GFR SERPL CREATININE-BSD FRML MDRD: 56 ML/MIN/1.73
GLUCOSE BLD-MCNC: 104 MG/DL (ref 74–99)
HCT VFR BLD CALC: 41.4 % (ref 34–48)
HEMOGLOBIN: 12.8 G/DL (ref 11.5–15.5)
IMMATURE GRANULOCYTES #: 0.01 E9/L
IMMATURE GRANULOCYTES %: 0.2 % (ref 0–5)
LACTATE DEHYDROGENASE: 142 U/L (ref 135–214)
LYMPHOCYTES ABSOLUTE: 2.52 E9/L (ref 1.5–4)
LYMPHOCYTES RELATIVE PERCENT: 38 % (ref 20–42)
MCH RBC QN AUTO: 27.6 PG (ref 26–35)
MCHC RBC AUTO-ENTMCNC: 30.9 % (ref 32–34.5)
MCV RBC AUTO: 89.2 FL (ref 80–99.9)
MONOCYTES ABSOLUTE: 0.73 E9/L (ref 0.1–0.95)
MONOCYTES RELATIVE PERCENT: 11 % (ref 2–12)
NEUTROPHILS ABSOLUTE: 3.16 E9/L (ref 1.8–7.3)
NEUTROPHILS RELATIVE PERCENT: 47.6 % (ref 43–80)
PDW BLD-RTO: 16.7 FL (ref 11.5–15)
PLATELET # BLD: 385 E9/L (ref 130–450)
PMV BLD AUTO: 11.5 FL (ref 7–12)
POTASSIUM SERPL-SCNC: 4.7 MMOL/L (ref 3.5–5)
RBC # BLD: 4.64 E12/L (ref 3.5–5.5)
SODIUM BLD-SCNC: 135 MMOL/L (ref 132–146)
WBC # BLD: 6.6 E9/L (ref 4.5–11.5)

## 2022-12-22 PROCEDURE — 82232 ASSAY OF BETA-2 PROTEIN: CPT

## 2022-12-22 PROCEDURE — 85025 COMPLETE CBC W/AUTO DIFF WBC: CPT

## 2022-12-22 PROCEDURE — 86334 IMMUNOFIX E-PHORESIS SERUM: CPT

## 2022-12-22 PROCEDURE — 82784 ASSAY IGA/IGD/IGG/IGM EACH: CPT

## 2022-12-22 PROCEDURE — 99212 OFFICE O/P EST SF 10 MIN: CPT

## 2022-12-22 PROCEDURE — 84165 PROTEIN E-PHORESIS SERUM: CPT

## 2022-12-22 PROCEDURE — 83615 LACTATE (LD) (LDH) ENZYME: CPT

## 2022-12-22 PROCEDURE — 80053 COMPREHEN METABOLIC PANEL: CPT

## 2022-12-22 PROCEDURE — 83883 ASSAY NEPHELOMETRY NOT SPEC: CPT

## 2022-12-22 PROCEDURE — 36415 COLL VENOUS BLD VENIPUNCTURE: CPT

## 2022-12-22 NOTE — PROGRESS NOTES
[address]  [address] [place] [address]   Hematology/Oncology  Consult      Patient Name: Nargis Jurado  YOB: 1960  PCP: Yarely Nolasco. Lotus Stark DO   Referring Provider:      Reason for Consultation:   Chief Complaint   Patient presents with    Follow-up     SHAUNNA      Interval history: No new complaints. History of Present Illness:  57years old female referred by Dr. Alfa Jean Baptiste, for abnormal SPEP. She was admitted in August 2022 with congestive heart failure and NSTEMI. Found to have coronary artery disease, EF of 10%, left atrial thrombus. On GDMT and Eliquis and LifeVest.  Serum immunofixation showed monoclonal IgG G kappa. M spike 2.4. Serum free kappa was 22, lambda 14 with a normal ratio of 1.5. Random UPEP also showed monoclonal IgG kappa. Chemistry from 2 weeks ago show creatinine of 1.2, calcium 9.4. No recent hemoglobin available. Patient denies any bony pain. Cardiac MRI has been ordered to rule out cardiac amyloidosis. Review of systems: Over 10 systems were reviewed and all were negative except as mentioned above.     Diagnostic Data:     Past Medical History:   Diagnosis Date    CHF (congestive heart failure) ([facility] [address]     Hyperlipidemia     Hypertension     Hypothyroidism 09/18/2022       Patient Active Problem List    Diagnosis Date Noted    Obesity (BMI 35.0-39.9 without comorbidity) 10/26/2022    Primary hypertension 10/15/2022    Iron deficiency anemia, unspecified 10/10/2022    Iron deficiency 09/19/2022    Class 2 obesity due to excess calories without serious comorbidity with body mass index (BMI) of 35.0 to 35.9 in adult 09/19/2022    HFrEF (heart failure with reduced ejection fraction) ([address] 09/19/2022    LV (left ventricular) mural thrombus 09/19/2022    Hypothyroidism 09/18/2022    CAD in native artery 08/29/2022    NICM (nonischemic cardiomyopathy) ([address] 08/29/2022    NSTEMI (non-ST elevated myocardial infarction) ([address] 08/26/2022 Past Surgical History:   Procedure Laterality Date    ANKLE SURGERY Right      &        Family History  Family History   Problem Relation Age of Onset    Heart Failure Mother          at age 80 years, was a smoker    Atrial Fibrillation Mother     Heart Failure Father          at age 71, was a smoker    Diabetes Father     Heart Attack Sister          at age 48 years, smoked    Arthritis Sister         smoked    Diabetes type 2  Brother         smoked    Diabetes type 2  Brother         smoked    Obesity Brother     Diabetes Brother         did not smoke       Social History    TOBACCO:   reports that she quit smoking about a year ago. Her smoking use included cigarettes. She started smoking about 44 years ago. She has a 10.50 pack-year smoking history. She has never used smokeless tobacco.  ETOH:   reports current alcohol use. Home Medications  Prior to Admission medications    Medication Sig Start Date End Date Taking?  Authorizing Provider   levothyroxine (SYNTHROID) 50 MCG tablet Take 1 tablet by mouth daily 22   Sanchez Ellis DO   furosemide (LASIX) 20 MG tablet Take 1 tablet by mouth daily as needed (Weight gain, shortness of breath, swelling) 22   CHELSEA Abreu - CNP   sacubitril-valsartan (ENTRESTO)  MG per tablet Take 1 tablet by mouth 2 times daily 22   CHELSEA Abreu CNP   simvastatin (ZOCOR) 20 MG tablet Take 20 mg by mouth nightly    Historical Provider, MD   empagliflozin (JARDIANCE) 10 MG tablet Take 1 tablet by mouth daily 22   CHELSEA Abreu CNP   apixaban (ELIQUIS) 5 MG TABS tablet Take 1 tablet by mouth 2 times daily 22   Tamar Coker MD   metoprolol succinate (TOPROL XL) 25 MG extended release tablet Take 1 tablet by mouth daily 22   Tamar Coker MD   spironolactone (ALDACTONE) 25 MG tablet Take 1 tablet by mouth daily 22   Tamar Coker MD   amLODIPine (NORVASC) 10 MG tablet Take 20 mg by mouth daily  8/30/22  Historical Provider, MD       Allergies  No Known Allergies    Review of Systems:      Objective  /65   Pulse 70   Temp 97.8 °F (36.6 °C)   Ht 5' (1.524 m)   Wt 193 lb 8 oz (87.8 kg)   SpO2 95%   BMI 37.79 kg/m²     Physical Performance Status    Physical Exam:  General: AAO to person, place, time, and purpose, appears stated age, cooperative, no acute distress, pleasant   Head and neck : PERRLA, EOMI . Sclera non icteric. Oropharynx : Clear  Neck: no JVD,  no adenopathy, no carotid bruit  LYMPHATICS : No peripheral lymphadenopathy. Lungs: Clear to auscultation   Heart: Regular rate and regular rhythm, no murmur, normal S1, S2  Abdomen: Soft, non-tender;no masses, no organomegaly  Extremities: No edema,no cyanosis, no clubbing. Skin:  No rash. Neurologic:Cranial nerves grossly intact. No focal motor or sensory deficits .     Recent Laboratory Data-   Lab Results   Component Value Date    WBC 6.6 12/22/2022    HGB 12.8 12/22/2022    HCT 41.4 12/22/2022    MCV 89.2 12/22/2022     12/22/2022    LYMPHOPCT 38.0 12/22/2022    RBC 4.64 12/22/2022    MCH 27.6 12/22/2022    MCHC 30.9 (L) 12/22/2022    RDW 16.7 (H) 12/22/2022    NEUTOPHILPCT 47.6 12/22/2022    MONOPCT 11.0 12/22/2022    BASOPCT 0.8 12/22/2022    NEUTROABS 3.16 12/22/2022    LYMPHSABS 2.52 12/22/2022    MONOSABS 0.73 12/22/2022    EOSABS 0.16 12/22/2022    BASOSABS 0.05 12/22/2022       Lab Results   Component Value Date     12/22/2022    K 4.7 12/22/2022     12/22/2022    CO2 24 12/22/2022    BUN 41 (H) 12/22/2022    CREATININE 1.1 (H) 12/22/2022    GLUCOSE 104 (H) 12/22/2022    CALCIUM 9.5 12/22/2022    PROT 9.7 (H) 11/10/2022    LABALBU 3.8 12/22/2022    BILITOT 0.2 12/22/2022    ALKPHOS 88 12/22/2022    AST 12 12/22/2022    ALT 11 12/22/2022    LABGLOM 56 12/22/2022    GFRAA 55 10/14/2022       Lab Results   Component Value Date    IRON 35 (L) 10/27/2022    TIBC 420 10/27/2022    FERRITIN 31 10/27/2022 Radiology-    No results found. ASSESSMENT/PLAN :    Danette Ortiz was seen today for follow-up. Diagnoses and all orders for this visit:    Monoclonal gammopathies  -     XR BONE SURVEY COMPLETE; Future  -     CBC with Auto Differential; Future  -     Comprehensive Metabolic Panel; Future  -     Kappa/Lambda Quantitative Free Light Chains, Serum; Future  -     Electrophoresis Protein, Serum; Future    57years old female with congestive heart failure, coronary artery disease, atrial thrombus, on GDMT, Eliquis LifeVest with monoclonal IgG kappa and serum and urine. Bone marrow biopsy and aspiration and skeletal survey were requested considering an M spike of 2.4. Not done because she is wearing a LifeVest and because she is on Eliquis. Eliquis could be held for the biopsy and LifeVest does not seem to be a contraindication. We will talk to interventional radiology. Patient also told me that after her cardiac MRI next week probably the LifeVest will be taken off. Hopefully bone marrow biopsy would be done within the next month. Will review labs from today. Serum free light light chains show worsening serum free kappa to 80 from 22 a month ago with a ratio 4.87. 24-hour UPEP showed minimal total protein of 74 mg with an M spike of 2.2 mg. Hemoglobin, creatinine, calcium normal.  No bone pain. Cardiac MRI has been ordered by Dr. Elizabeth Chen to rule out cardiac amyloidosis. Blood test from last month show low normal ferritin of 44, iron saturation of 11 TIBC of 400. We will repeat iron panel. Return to clinic in 4 weeks. No follow-ups on file.      Clarence Ward MD   Electronically signed 12/22/2022 at 11:01 AM

## 2022-12-24 LAB
KAPPA FREE LIGHT CHAINS QNT: 79.06 MG/L (ref 3.3–19.4)
KAPPA/LAMBDA FREE LIGHT CHAIN RATIO: 5.37 (ref 0.26–1.65)
LAMBDA FREE LIGHT CHAINS QNT: 14.73 MG/L (ref 5.71–26.3)

## 2022-12-28 LAB
ALBUMIN SERPL-MCNC: 3.3 G/DL (ref 3.5–4.7)
ALPHA-1-GLOBULIN: 0.3 G/DL (ref 0.2–0.4)
ALPHA-2-GLOBULIN: 0.9 G/DL (ref 0.5–1)
BETA GLOBULIN: 1.7 G/DL (ref 0.8–1.3)
ELECTROPHORESIS: ABNORMAL
GAMMA GLOBULIN: 3.4 G/DL (ref 0.7–1.6)
IGA: 76 MG/DL (ref 70–400)
IGG: 3732 MG/DL (ref 700–1600)
IGM: 140 MG/DL (ref 40–230)
IMMUNOFIXATION RESULT, SERUM: NORMAL
TOTAL PROTEIN: 9.6 G/DL (ref 6.4–8.3)

## 2022-12-29 ENCOUNTER — HOSPITAL ENCOUNTER (OUTPATIENT)
Dept: MRI IMAGING | Age: 62
Discharge: HOME | End: 2022-12-31

## 2022-12-29 DIAGNOSIS — I42.8 NICM (NONISCHEMIC CARDIOMYOPATHY) (HCC): ICD-10-CM

## 2022-12-29 LAB — BETA-2 MICROGLOBULIN: 3.2 MG/L (ref 0.6–2.4)

## 2022-12-29 PROCEDURE — A9579 GAD-BASE MR CONTRAST NOS,1ML: HCPCS | Performed by: RADIOLOGY

## 2022-12-29 PROCEDURE — 75561 CARDIAC MRI FOR MORPH W/DYE: CPT

## 2022-12-29 PROCEDURE — 6360000004 HC RX CONTRAST MEDICATION: Performed by: RADIOLOGY

## 2022-12-29 RX ADMIN — GADOTERIDOL 40 ML: 279.3 INJECTION, SOLUTION INTRAVENOUS at 11:09

## 2023-01-11 ENCOUNTER — HOSPITAL ENCOUNTER (OUTPATIENT)
Age: 63
Discharge: HOME | End: 2023-01-13

## 2023-01-11 ENCOUNTER — HOSPITAL ENCOUNTER (OUTPATIENT)
Dept: GENERAL RADIOLOGY | Age: 63
Discharge: HOME | End: 2023-01-13

## 2023-01-11 DIAGNOSIS — D47.2 MONOCLONAL GAMMOPATHIES: ICD-10-CM

## 2023-01-11 PROCEDURE — 77074 RADEX OSSEOUS SURVEY LMTD: CPT

## 2023-01-16 ENCOUNTER — HOSPITAL ENCOUNTER (OUTPATIENT)
Dept: OTHER | Age: 63
Setting detail: THERAPIES SERIES
Discharge: HOME | End: 2023-01-16

## 2023-01-16 ENCOUNTER — TELEPHONE (OUTPATIENT)
Dept: CARDIOLOGY CLINIC | Age: 63
End: 2023-01-16

## 2023-01-16 VITALS
OXYGEN SATURATION: 96 % | BODY MASS INDEX: 38.29 KG/M2 | HEART RATE: 70 BPM | WEIGHT: 195.05 LBS | SYSTOLIC BLOOD PRESSURE: 133 MMHG | DIASTOLIC BLOOD PRESSURE: 69 MMHG | HEIGHT: 60 IN | RESPIRATION RATE: 18 BRPM

## 2023-01-16 DIAGNOSIS — I50.20 HFREF (HEART FAILURE WITH REDUCED EJECTION FRACTION) (HCC): Primary | ICD-10-CM

## 2023-01-16 LAB
ANION GAP SERPL CALCULATED.3IONS-SCNC: 10 MMOL/L (ref 7–16)
BUN BLDV-MCNC: 42 MG/DL (ref 6–23)
CALCIUM SERPL-MCNC: 9.9 MG/DL (ref 8.6–10.2)
CHLORIDE BLD-SCNC: 96 MMOL/L (ref 98–107)
CO2: 24 MMOL/L (ref 22–29)
CREAT SERPL-MCNC: 1.4 MG/DL (ref 0.5–1)
GFR SERPL CREATININE-BSD FRML MDRD: 42 ML/MIN/1.73
GLUCOSE BLD-MCNC: 118 MG/DL (ref 74–99)
POTASSIUM SERPL-SCNC: 4.3 MMOL/L (ref 3.5–5)
PRO-BNP: 43 PG/ML (ref 0–125)
SODIUM BLD-SCNC: 130 MMOL/L (ref 132–146)

## 2023-01-16 PROCEDURE — 36415 COLL VENOUS BLD VENIPUNCTURE: CPT

## 2023-01-16 PROCEDURE — 80048 BASIC METABOLIC PNL TOTAL CA: CPT

## 2023-01-16 PROCEDURE — 99214 OFFICE O/P EST MOD 30 MIN: CPT

## 2023-01-16 PROCEDURE — 83880 ASSAY OF NATRIURETIC PEPTIDE: CPT

## 2023-01-16 NOTE — TELEPHONE ENCOUNTER
----- Message from Teddy Heimlich, APRN - CNP sent at 1/16/2023  3:42 PM EST -----  Labs and CHF clinic note reviewed  Vitals at CHF clinic: /69   Pulse 70     Current GDMT:  Entresto 97/103 mg BID  Toprol 25 mg daily   Spironolactone 25 mg daily   Jardiance 10 mg daily   Lasix 20 mg daily PRN      Has she been using any doses of her PRN diuretic  Sodium 130, rise in BUN/SCr  BNP 43    How much fluid is she drinking?     Thank you

## 2023-01-16 NOTE — PROGRESS NOTES
Congestive Heart Failure [facility] [address]     TITRATION PATIENT     Slime Jett   1960    Referring Provider: Dr. Beny Kohler   Primary Care Physician: Dr. You Morton DO  Cardiologist: Dr. Beny Kohler / Rosalinda TRAN- CNP  Nephrologist: N/A    History of Present Illness:   Slime Jett is a 57 y.o. female with a history of  HFimpHF  , most recent 50% EF Cardiac MRI (12/29/2022)     Patient Story:  She does not have dyspnea with exertion, shortness of breath, or decline in overall functional capacity. She does not have orthopnea, PND, nocturnal cough or hemoptysis. She does not have abdominal distention or bloating, early satiety, anorexia/change in appetite. She does has a good urinary response to oral diuretic ; when she takes the diuretic however she has not had to use PRN diuretic. Pt has been drinking 46 oz daily. Pt has not been doing well with the increase of fluid per patient. Reinforced 64 oz daily to ensure hydration. She does not have lower extremity edema. She denies lightheadedness, dizziness. She denies palpitations, syncope or near syncope. She does not complain of chest pain, pressure, discomfort.      No Known Allergies    Current Outpatient Medications on File Prior to Encounter   Medication Sig Dispense Refill    levothyroxine (SYNTHROID) 50 MCG tablet Take 1 tablet by mouth daily 90 tablet 1    furosemide (LASIX) 20 MG tablet Take 1 tablet by mouth daily as needed (Weight gain, shortness of breath, swelling) 90 tablet 2    sacubitril-valsartan (ENTRESTO)  MG per tablet Take 1 tablet by mouth 2 times daily 60 tablet 3    simvastatin (ZOCOR) 20 MG tablet Take 20 mg by mouth nightly      empagliflozin (JARDIANCE) 10 MG tablet Take 1 tablet by mouth daily 30 tablet 5    apixaban (ELIQUIS) 5 MG TABS tablet Take 1 tablet by mouth 2 times daily 180 tablet 2    metoprolol succinate (TOPROL XL) 25 MG extended release tablet Take 1 tablet by mouth daily 90 tablet 3    spironolactone (ALDACTONE) 25 MG tablet Take 1 tablet by mouth daily 90 tablet 3    [DISCONTINUED] amLODIPine (NORVASC) 10 MG tablet Take 20 mg by mouth daily       No current facility-administered medications on file prior to encounter. Guideline directed medical:  ARNI/ACE I/ARB: Yes entresto 24/26 mg BID  Beta blocker:  Yes metoprolol succinate 25 mg daily   Aldosterone antagonist:  Yes spirolactone 25 mg daily   SGLT2i:  Yes jardiance 10 mg daily    Physical Examination:   /69   Pulse 70   Resp 18   Ht 5' (1.524 m)   Wt 195 lb 0.8 oz (88.5 kg)   SpO2 96%   BMI 38.09 kg/m²     Assessment  Charting Type: Shift assessment (CHF clinic  NYHA I)    Neurological  Level of Consciousness: Alert (0)    Respiratory  Respiratory Pattern: Regular  Respiratory Depth: Normal  Respiratory Quality/Effort: Unlabored  Chest Assessment: Chest expansion symmetrical  L Breath Sounds: Clear, Diminished  R Breath Sounds: Clear, Diminished    Cough/Sputum  Cough: None    Cardiac  Cardiac Regularity: Regular  Heart Sounds: S1, S2  Cardiac Rhythm: Sinus rhythm    Rhythm Interpretation  Heart Rate: 70    Gastrointestinal  Abdominal (WDL): Within Defined Limits  Abdomen Inspection: Soft  RUQ Bowel Sounds: Active  LUQ Bowel Sounds: Active  RLQ Bowel Sounds: Active  LLQ Bowel Sounds: Active     Bowel Sounds  RUQ Bowel Sounds: Active  LUQ Bowel Sounds: Active  RLQ Bowel Sounds: Active  LLQ Bowel Sounds:  Active    Peripheral Vascular  Peripheral Vascular (WDL): Within Defined Limits  Edema: Right lower extremity, Left lower extremity  RLE Edema: None  LLE Edema: None    Genitourinary  Genitourinary (WDL): Within Defined Limits    Heart Rate: 70      LAB DATA:    Last 3 BMP      Sodium (mmol/L)   Date Value   01/16/2023 130 (L)   12/22/2022 135   12/16/2022 134     Potassium (mmol/L)   Date Value   01/16/2023 4.3   12/22/2022 4.7   12/16/2022 4.4     Potassium reflex Magnesium (mmol/L)   Date Value 08/29/2022 3.1 (L)   08/27/2022 3.9   08/26/2022 4.0     Chloride (mmol/L)   Date Value   01/16/2023 96 (L)   12/22/2022 103   12/16/2022 103     CO2 (mmol/L)   Date Value   01/16/2023 24   12/22/2022 24   12/16/2022 21 (L)     BUN (mg/dL)   Date Value   01/16/2023 42 (H)   12/22/2022 41 (H)   12/16/2022 31 (H)     Glucose (mg/dL)   Date Value   01/16/2023 118 (H)   12/22/2022 104 (H)   12/16/2022 118 (H)     Calcium (mg/dL)   Date Value   01/16/2023 9.9   12/22/2022 9.5   12/16/2022 9.1       Last 3 BNP       Pro-BNP (pg/mL)   Date Value   01/16/2023 43   12/16/2022 51   11/30/2022 64      CBC:   No results for input(s): WBC, HGB, PLT in the last 72 hours. BMP:    Recent Labs     01/16/23  1040   *   K 4.3   CL 96*   CO2 24   BUN 42*   CREATININE 1.4*   GLUCOSE 118*       Hepatic:   No results for input(s): AST, ALT, ALB, BILITOT, ALKPHOS in the last 72 hours. Troponin: No results for input(s): TROPONINI in the last 72 hours. BNP: No results for input(s): BNP in the last 72 hours. Lipids: No results for input(s): CHOL, HDL in the last 72 hours. Invalid input(s): LDLCALCU  INR: No results for input(s): INR in the last 72 hours. WEIGHTS:    Wt Readings from Last 3 Encounters:   01/16/23 195 lb 0.8 oz (88.5 kg)   12/22/22 193 lb 8 oz (87.8 kg)   12/16/22 193 lb 6.4 oz (87.7 kg)     TELEMETRY:  Cardiac Regularity: Regular  Cardiac Rhythm/Interpretation: SR    ASSESSMENT:  Dhiraj Ernandez presents for CHF clinic follow up. Lungs are clear, abdomen is soft, and there is no edema present in BLE. Denies any SOB, JACOBO or orthopnea. Pt feels great. Patient is weighting herself daily and states overall stables weights. Pt denies any diuretic use for s/s symptomatic CHF. Pt is on the recall list for Dr. Yan Madden 3/2023-5/2023. Pt has appointment for PCP in March 2023.    2 months follow up.    Interventions completed this visit:  IV diuretics given no  Lab work obtained yes, BNP BMP  Reviewed currently prescribed medications with patient, educated on importance of compliance and answered any questions regarding their medication  Educated on low sodium diet    PLAN:  Scheduled to follow up in CHF clinic on   Future Appointments   Date Time Provider Arcadio Phipps   1/19/2023 10:40  [address]   1/19/2023 11:45 AM Betty Recinos MD Blood [facility]   3/2/2023  8:00 AM DO [facility]   3/17/2023 10:30 AM [facility] CHF ROOM 1 [address]     Given clinic phone number and aware of signs and symptoms to call with any HF change in symptoms.

## 2023-01-16 NOTE — RESULT ENCOUNTER NOTE
Labs and CHF clinic note reviewed  Vitals at CHF clinic: /69   Pulse 70     Current GDMT:  Entresto 97/103 mg BID  Toprol 25 mg daily   Spironolactone 25 mg daily   Jardiance 10 mg daily   Lasix 20 mg daily PRN      Has she been using any doses of her PRN diuretic  Sodium 130, rise in BUN/SCr  BNP 43    How much fluid is she drinking?     Thank you

## 2023-01-16 NOTE — TELEPHONE ENCOUNTER
No PRN diuretic used  Drinking around (3 ) 20 ounce glasses in am and (2 )10 ounce glasses at night  Urinating good.

## 2023-01-17 ENCOUNTER — COMMUNITY OUTREACH (OUTPATIENT)
Dept: FAMILY MEDICINE CLINIC | Age: 63
End: 2023-01-17

## 2023-01-18 ENCOUNTER — TELEPHONE (OUTPATIENT)
Dept: CARDIAC REHAB | Age: 63
End: 2023-01-18

## 2023-01-18 NOTE — TELEPHONE ENCOUNTER
Spoke with the patient regarding Cardiac Rehab. She was denied Medicaid sp she cannot afford to come for Cardiac Rehab. Sent a note to [facility].

## 2023-01-18 NOTE — TELEPHONE ENCOUNTER
Per CLAUDIA Aceves cnp repeat bmp next week    I have reviewed the provider's instructions with the patient, answering all questions to her satisfaction.

## 2023-01-19 ENCOUNTER — OFFICE VISIT (OUTPATIENT)
Dept: ONCOLOGY | Age: 63
End: 2023-01-19

## 2023-01-19 ENCOUNTER — HOSPITAL ENCOUNTER (OUTPATIENT)
Dept: INFUSION THERAPY | Age: 63
Discharge: HOME | End: 2023-01-19

## 2023-01-19 VITALS
HEIGHT: 60 IN | TEMPERATURE: 97.2 F | SYSTOLIC BLOOD PRESSURE: 149 MMHG | HEART RATE: 64 BPM | BODY MASS INDEX: 38.6 KG/M2 | OXYGEN SATURATION: 97 % | DIASTOLIC BLOOD PRESSURE: 87 MMHG | WEIGHT: 196.6 LBS

## 2023-01-19 DIAGNOSIS — D47.2 MONOCLONAL GAMMOPATHIES: ICD-10-CM

## 2023-01-19 DIAGNOSIS — D47.2 MONOCLONAL GAMMOPATHIES: Primary | ICD-10-CM

## 2023-01-19 LAB
ALBUMIN SERPL-MCNC: 3.8 G/DL (ref 3.5–5.2)
ALP BLD-CCNC: 106 U/L (ref 35–104)
ALT SERPL-CCNC: 14 U/L (ref 0–32)
ANION GAP SERPL CALCULATED.3IONS-SCNC: 9 MMOL/L (ref 7–16)
AST SERPL-CCNC: 14 U/L (ref 0–31)
BASOPHILS ABSOLUTE: 0.04 E9/L (ref 0–0.2)
BASOPHILS RELATIVE PERCENT: 0.8 % (ref 0–2)
BILIRUB SERPL-MCNC: <0.2 MG/DL (ref 0–1.2)
BUN BLDV-MCNC: 22 MG/DL (ref 6–23)
CALCIUM SERPL-MCNC: 9.3 MG/DL (ref 8.6–10.2)
CHLORIDE BLD-SCNC: 101 MMOL/L (ref 98–107)
CO2: 25 MMOL/L (ref 22–29)
CREAT SERPL-MCNC: 1.1 MG/DL (ref 0.5–1)
EOSINOPHILS ABSOLUTE: 0.14 E9/L (ref 0.05–0.5)
EOSINOPHILS RELATIVE PERCENT: 2.9 % (ref 0–6)
FERRITIN: 18 NG/ML
GFR SERPL CREATININE-BSD FRML MDRD: 56 ML/MIN/1.73
GLUCOSE BLD-MCNC: 130 MG/DL (ref 74–99)
HCT VFR BLD CALC: 41.3 % (ref 34–48)
HEMOGLOBIN: 13.2 G/DL (ref 11.5–15.5)
IMMATURE GRANULOCYTES #: 0 E9/L
IMMATURE GRANULOCYTES %: 0 % (ref 0–5)
IRON SATURATION: 11 % (ref 15–50)
IRON: 46 MCG/DL (ref 37–145)
LYMPHOCYTES ABSOLUTE: 1.96 E9/L (ref 1.5–4)
LYMPHOCYTES RELATIVE PERCENT: 40 % (ref 20–42)
MCH RBC QN AUTO: 28.1 PG (ref 26–35)
MCHC RBC AUTO-ENTMCNC: 32 % (ref 32–34.5)
MCV RBC AUTO: 88.1 FL (ref 80–99.9)
MONOCYTES ABSOLUTE: 0.58 E9/L (ref 0.1–0.95)
MONOCYTES RELATIVE PERCENT: 11.8 % (ref 2–12)
NEUTROPHILS ABSOLUTE: 2.18 E9/L (ref 1.8–7.3)
NEUTROPHILS RELATIVE PERCENT: 44.5 % (ref 43–80)
PDW BLD-RTO: 15.5 FL (ref 11.5–15)
PLATELET # BLD: 336 E9/L (ref 130–450)
PMV BLD AUTO: 10.9 FL (ref 7–12)
POTASSIUM SERPL-SCNC: 4.7 MMOL/L (ref 3.5–5)
RBC # BLD: 4.69 E12/L (ref 3.5–5.5)
SODIUM BLD-SCNC: 135 MMOL/L (ref 132–146)
TOTAL IRON BINDING CAPACITY: 420 MCG/DL (ref 250–450)
WBC # BLD: 4.9 E9/L (ref 4.5–11.5)

## 2023-01-19 PROCEDURE — 82728 ASSAY OF FERRITIN: CPT

## 2023-01-19 PROCEDURE — 84165 PROTEIN E-PHORESIS SERUM: CPT

## 2023-01-19 PROCEDURE — 83883 ASSAY NEPHELOMETRY NOT SPEC: CPT

## 2023-01-19 PROCEDURE — 80053 COMPREHEN METABOLIC PANEL: CPT

## 2023-01-19 PROCEDURE — 83550 IRON BINDING TEST: CPT

## 2023-01-19 PROCEDURE — 99213 OFFICE O/P EST LOW 20 MIN: CPT

## 2023-01-19 PROCEDURE — 86334 IMMUNOFIX E-PHORESIS SERUM: CPT

## 2023-01-19 PROCEDURE — 36415 COLL VENOUS BLD VENIPUNCTURE: CPT

## 2023-01-19 PROCEDURE — 83540 ASSAY OF IRON: CPT

## 2023-01-19 PROCEDURE — 85025 COMPLETE CBC W/AUTO DIFF WBC: CPT

## 2023-01-19 NOTE — PROGRESS NOTES
Labs drawn peripherally and dry dressing applied. Specimen sent to lab. Pt agrees to alternate. Please review and finish sig and sign

## 2023-01-19 NOTE — PROGRESS NOTES
[address]  [place] , [facility]   Hematology/Oncology  Consult      Patient Name: Catherne Cowden  YOB: 1960  PCP: Hao Gannon. Marlon Quintana DO   Referring Provider:      Reason for Consultation:   Chief Complaint   Patient presents with    Follow-up     Monoclonal gammopathies      Interval history: No new complaints. History of Present Illness:  57years old female referred by Dr. Angela Pickett, for abnormal SPEP. She was admitted in August 2022 with congestive heart failure and NSTEMI. Found to have coronary artery disease, EF of 10%, left atrial thrombus. On GDMT and Eliquis and LifeVest.  Serum immunofixation showed monoclonal IgG G kappa. M spike 2.4. Serum free kappa was 22, lambda 14 with a normal ratio of 1.5. Random UPEP also showed monoclonal IgG kappa. Chemistry from 2 weeks ago show creatinine of 1.2, calcium 9.4. No recent hemoglobin available. Patient denies any bony pain. Cardiac MRI has been ordered to rule out cardiac amyloidosis. Review of systems: Over 10 systems were reviewed and all were negative except as mentioned above.     Diagnostic Data:     Past Medical History:   Diagnosis Date    CHF (congestive heart failure) ([facility] [address]     Hyperlipidemia     Hypertension     Hypothyroidism 09/18/2022       Patient Active Problem List    Diagnosis Date Noted    Obesity (BMI 35.0-39.9 without comorbidity) 10/26/2022    Primary hypertension 10/15/2022    Iron deficiency anemia, unspecified 10/10/2022    Iron deficiency 09/19/2022    Class 2 obesity due to excess calories without serious comorbidity with body mass index (BMI) of 35.0 to 35.9 in adult 09/19/2022    HFrEF (heart failure with reduced ejection fraction) ([facility] [address] 09/19/2022    LV (left ventricular) mural thrombus 09/19/2022    Hypothyroidism 09/18/2022    CAD in native artery 08/29/2022    NICM (nonischemic cardiomyopathy) ([address] 08/29/2022    NSTEMI (non-ST elevated myocardial infarction) ([address] 2022        Past Surgical History:   Procedure Laterality Date    ANKLE SURGERY Right      &        Family History  Family History   Problem Relation Age of Onset    Heart Failure Mother          at age 80 years, was a smoker    Atrial Fibrillation Mother     Heart Failure Father          at age 71, was a smoker    Diabetes Father     Heart Attack Sister          at age 48 years, smoked    Arthritis Sister         smoked    Diabetes type 2  Brother         smoked    Diabetes type 2  Brother         smoked    Obesity Brother     Diabetes Brother         did not smoke       Social History    TOBACCO:   reports that she quit smoking about 12 months ago. Her smoking use included cigarettes. She started smoking about 44 years ago. She has a 10.50 pack-year smoking history. She has never used smokeless tobacco.  ETOH:   reports current alcohol use. Home Medications  Prior to Admission medications    Medication Sig Start Date End Date Taking?  Authorizing Provider   levothyroxine (SYNTHROID) 50 MCG tablet Take 1 tablet by mouth daily 22   Lazarus Laming,    furosemide (LASIX) 20 MG tablet Take 1 tablet by mouth daily as needed (Weight gain, shortness of breath, swelling) 22, APRN - CNP   sacubitril-valsartan (ENTRESTO)  MG per tablet Take 1 tablet by mouth 2 times daily 22, APRN - CNP   simvastatin (ZOCOR) 20 MG tablet Take 20 mg by mouth nightly    Historical Provider, MD   empagliflozin (JARDIANCE) 10 MG tablet Take 1 tablet by mouth daily 22, APRN - CNP   apixaban (ELIQUIS) 5 MG TABS tablet Take 1 tablet by mouth 2 times daily 22   Victor Hugo Stanford MD   metoprolol succinate (TOPROL XL) 25 MG extended release tablet Take 1 tablet by mouth daily 22   Victor Hugo Stanford MD   spironolactone (ALDACTONE) 25 MG tablet Take 1 tablet by mouth daily 22   Victor Hugo Stanford MD   amLODIPine (NORVASC) 10 MG tablet Take 20 mg by mouth daily  8/30/22  Historical Provider, MD       Allergies  No Known Allergies    Review of Systems:      Objective  BP (!) 149/87   Pulse 64   Temp 97.2 °F (36.2 °C)   Ht 5' (1.524 m)   Wt 196 lb 9.6 oz (89.2 kg)   SpO2 97%   BMI 38.40 kg/m²     Physical Performance Status    Physical Exam:  General: AAO to person, place, time, and purpose, appears stated age, cooperative, no acute distress, pleasant   Head and neck : PERRLA, EOMI . Sclera non icteric. Oropharynx : Clear  Neck: no JVD,  no adenopathy, no carotid bruit  LYMPHATICS : No peripheral lymphadenopathy. Lungs: Clear to auscultation   Heart: Regular rate and regular rhythm, no murmur, normal S1, S2  Abdomen: Soft, non-tender;no masses, no organomegaly  Extremities: No edema,no cyanosis, no clubbing. Skin:  No rash. Neurologic:Cranial nerves grossly intact. No focal motor or sensory deficits .     Recent Laboratory Data-   Lab Results   Component Value Date    WBC 4.9 01/19/2023    HGB 13.2 01/19/2023    HCT 41.3 01/19/2023    MCV 88.1 01/19/2023     01/19/2023    LYMPHOPCT 40.0 01/19/2023    RBC 4.69 01/19/2023    MCH 28.1 01/19/2023    MCHC 32.0 01/19/2023    RDW 15.5 (H) 01/19/2023    NEUTOPHILPCT 44.5 01/19/2023    MONOPCT 11.8 01/19/2023    BASOPCT 0.8 01/19/2023    NEUTROABS 2.18 01/19/2023    LYMPHSABS 1.96 01/19/2023    MONOSABS 0.58 01/19/2023    EOSABS 0.14 01/19/2023    BASOSABS 0.04 01/19/2023       Lab Results   Component Value Date     (L) 01/16/2023    K 4.3 01/16/2023    CL 96 (L) 01/16/2023    CO2 24 01/16/2023    BUN 42 (H) 01/16/2023    CREATININE 1.4 (H) 01/16/2023    GLUCOSE 118 (H) 01/16/2023    CALCIUM 9.9 01/16/2023    PROT 9.6 (H) 12/22/2022    LABALBU 3.3 (L) 12/22/2022    LABALBU 3.8 12/22/2022    BILITOT 0.2 12/22/2022    ALKPHOS 88 12/22/2022    AST 12 12/22/2022    ALT 11 12/22/2022    LABGLOM 42 01/16/2023    GFRAA 55 10/14/2022       Lab Results   Component Value Date    IRON 35 (L) 10/27/2022    TIBC 420 10/27/2022    FERRITIN 31 10/27/2022           Radiology-    No results found. ASSESSMENT/PLAN :    Ryan Luke was seen today for follow-up. Diagnoses and all orders for this visit:    Monoclonal gammopathies  -     IR BONE MARROW BIOPSY AND ASPIRATION; Future  -     CBC with Auto Differential; Future  -     Comprehensive Metabolic Panel; Future  -     CT PELVIS WO CONTRAST Additional Contrast? None; Future    57years old female with congestive heart failure, coronary artery disease, atrial thrombus, on GDMT, Eliquis, LifeVest with monoclonal IgG kappa and serum and urine. Bone marrow biopsy and aspiration and skeletal survey were requested considering an M spike of 2.4. Not done because she was wearing a LifeVest and because she is on Eliquis. Eliquis could be held for the biopsy and LifeVest has been discontinued since her EF has improved. Put in another request for bone marrow biopsy. Bilateral survey showed no lytic lesions. Left sacroiliac sclerotic lesion was noted. CT pelvis was recommended-ordered. Serum free light light chains show worsening serum free kappa to 80 from 22 a month ago with a ratio 4.87. 24-hour UPEP showed minimal total protein of 74 mg with an M spike of 2.2 mg.  Serum M spike is gone up from 2.43 over the past 4 months. Hemoglobin, calcium normal.  No bone pain. Creatinine gone up to 1.4-possibly due to diuretic use. Cardiac MRI not suggestive of cardiac amyloidosis. Blood test from last month show low normal ferritin of 44, iron saturation of 11 TIBC of 400. Repeated today. Return to clinic in 4 weeks. Return in about 4 weeks (around 2/16/2023).      Claire Chapman MD   Electronically signed 1/19/2023 at 11:49 AM

## 2023-01-20 LAB
ALBUMIN SERPL-MCNC: 3.2 G/DL (ref 3.5–4.7)
ALPHA-1-GLOBULIN: 0.3 G/DL (ref 0.2–0.4)
ALPHA-2-GLOBULIN: 0.9 G/DL (ref 0.5–1)
BETA GLOBULIN: 1.9 G/DL (ref 0.8–1.3)
ELECTROPHORESIS: ABNORMAL
GAMMA GLOBULIN: 3.3 G/DL (ref 0.7–1.6)
IMMUNOFIXATION RESULT, SERUM: NORMAL
KAPPA FREE LIGHT CHAINS QNT: 81.25 MG/L (ref 3.3–19.4)
KAPPA/LAMBDA FREE LIGHT CHAIN RATIO: 5.52 (ref 0.26–1.65)
LAMBDA FREE LIGHT CHAINS QNT: 14.73 MG/L (ref 5.71–26.3)
TOTAL PROTEIN: 9.6 G/DL (ref 6.4–8.3)

## 2023-01-24 ENCOUNTER — HOSPITAL ENCOUNTER (OUTPATIENT)
Age: 63
Discharge: HOME | End: 2023-01-24

## 2023-01-24 DIAGNOSIS — I50.20 HFREF (HEART FAILURE WITH REDUCED EJECTION FRACTION) (HCC): ICD-10-CM

## 2023-01-24 LAB
ANION GAP SERPL CALCULATED.3IONS-SCNC: 9 MMOL/L (ref 7–16)
BUN BLDV-MCNC: 18 MG/DL (ref 6–23)
CALCIUM SERPL-MCNC: 9.3 MG/DL (ref 8.6–10.2)
CHLORIDE BLD-SCNC: 101 MMOL/L (ref 98–107)
CO2: 27 MMOL/L (ref 22–29)
CREAT SERPL-MCNC: 1 MG/DL (ref 0.5–1)
GFR SERPL CREATININE-BSD FRML MDRD: >60 ML/MIN/1.73
GLUCOSE BLD-MCNC: 128 MG/DL (ref 74–99)
POTASSIUM SERPL-SCNC: 4.7 MMOL/L (ref 3.5–5)
SODIUM BLD-SCNC: 137 MMOL/L (ref 132–146)

## 2023-01-24 PROCEDURE — 80048 BASIC METABOLIC PNL TOTAL CA: CPT

## 2023-01-24 PROCEDURE — 36415 COLL VENOUS BLD VENIPUNCTURE: CPT

## 2023-02-02 ENCOUNTER — HOSPITAL ENCOUNTER (OUTPATIENT)
Dept: PREADMISSION TESTING | Age: 63
Discharge: HOME | End: 2023-02-02

## 2023-02-06 ENCOUNTER — HOSPITAL ENCOUNTER (OUTPATIENT)
Dept: PREADMISSION TESTING | Age: 63
Discharge: HOME | End: 2023-02-06

## 2023-02-06 VITALS — HEIGHT: 60 IN | WEIGHT: 183 LBS | BODY MASS INDEX: 35.93 KG/M2

## 2023-02-06 RX ORDER — SODIUM CHLORIDE 0.9 % (FLUSH) 0.9 %
5-40 SYRINGE (ML) INJECTION PRN
Status: CANCELLED | OUTPATIENT
Start: 2023-02-07

## 2023-02-06 NOTE — PROGRESS NOTES
[address] [facility]                                                                                                                    PRE OP INSTRUCTIONS FOR  [place]        Date: 2/6/2023    Date of surgery: 2/7/23   Arrival Time: Hospital will call you between 5pm and 7pm with your final arrival time for surgery    Do not eat or drink anything after midnight prior to surgery. This includes no water, chewing gum, mints or ice chips. Take the following medications with a small sip of water on the morning of Surgery: am meds except water pill     Diabetics may take evening dose of insulin but none after midnight. If you feel symptomatic or low blood sugar morning of surgery drink 1-2 ounces of apple juice only. Aspirin, Ibuprofen, Advil, Naproxen, Vitamin E and other Anti-inflammatory products should be stopped  before surgery  as directed by your physician. Take Tylenol only unless instructed otherwise by your surgeon. Check with your Doctor regarding stopping Plavix, Coumadin, Lovenox, Eliquis, Effient, or other blood thinners. Do not smoke,use illicit drugs and do not drink any alcoholic beverages 24 hours prior to surgery. You may brush your teeth the morning of surgery. DO NOT SWALLOW WATER    You MUST make arrangements for a responsible adult to take you home after your surgery. You will not be allowed to leave alone or drive yourself home. It is strongly suggested someone stay with you the first 24 hrs. Your surgery will be cancelled if you do not have a ride home. PEDIATRIC PATIENTS ONLY:  A parent/legal guardian must accompany a child scheduled for surgery and plan to stay at the hospital until the child is discharged. Please do not bring other children with you.     Please wear simple, loose fitting clothing to the hospital.  Yung Ng not bring valuables (money, credit cards, checkbooks, etc.) Do not wear any makeup (including no eye makeup) or nail polish on your fingers or toes. DO NOT wear any jewelry or piercings on day of surgery. All body piercing jewelry must be removed. Shower the night before surgery with _x__Antibacterial soap /MAGALI WIPES________    TOTAL JOINT REPLACEMENT/HYSTERECTOMY PATIENTS ONLY---Remember to bring Blood Bank bracelet to the hospital on the day of surgery. If you have a Living Will and Durable Power of  for Healthcare, please bring in a copy. If appropriate bring crutches, inspirex, WALKER, CANE etc... Notify your Surgeon if you develop any illness between now and surgery time, cough, cold, fever, sore throat, nausea, vomiting, etc.  Please notify your surgeon if you experience dizziness, shortness of breath or blurred vision between now & the time of your surgery. If you have ___dentures, they will be removed before going to the OR; we will provide you a container. If you wear ___contact lenses or _x__glasses, they will be removed; please bring a case for them. To provide excellent care visitors will be limited to 2 in the room at any given time. Please bring picture ID and insurance card. Sleep apnea patients need to bring CPAP AND SETTINGS to hospital on day of surgery. During flu season no children under the age of 15 are permitted in the hospital for the safety of all patients. Other                   Please call AMBULATORY CARE if you have any further questions.    [address] [facility]     [address]

## 2023-02-07 ENCOUNTER — HOSPITAL ENCOUNTER (OUTPATIENT)
Dept: CT IMAGING | Age: 63
Discharge: HOME | End: 2023-02-07
Payer: COMMERCIAL

## 2023-02-07 VITALS
BODY MASS INDEX: 39.13 KG/M2 | RESPIRATION RATE: 16 BRPM | OXYGEN SATURATION: 99 % | SYSTOLIC BLOOD PRESSURE: 150 MMHG | TEMPERATURE: 97 F | HEART RATE: 60 BPM | WEIGHT: 199.3 LBS | HEIGHT: 60 IN | DIASTOLIC BLOOD PRESSURE: 70 MMHG

## 2023-02-07 DIAGNOSIS — D47.2 MONOCLONAL GAMMOPATHIES: ICD-10-CM

## 2023-02-07 LAB
APTT: 30.1 SEC (ref 24.5–35.1)
HCT VFR BLD CALC: 41.8 % (ref 34–48)
HEMOGLOBIN: 12.9 G/DL (ref 11.5–15.5)
INR BLD: 1
MCH RBC QN AUTO: 26.9 PG (ref 26–35)
MCHC RBC AUTO-ENTMCNC: 30.9 % (ref 32–34.5)
MCV RBC AUTO: 87.3 FL (ref 80–99.9)
PDW BLD-RTO: 15.3 FL (ref 11.5–15)
PLATELET # BLD: 369 E9/L (ref 130–450)
PMV BLD AUTO: 10.9 FL (ref 7–12)
PROTHROMBIN TIME: 11.4 SEC (ref 9.3–12.4)
RBC # BLD: 4.79 E12/L (ref 3.5–5.5)
WBC # BLD: 7.8 E9/L (ref 4.5–11.5)

## 2023-02-07 PROCEDURE — 36415 COLL VENOUS BLD VENIPUNCTURE: CPT

## 2023-02-07 PROCEDURE — 6360000002 HC RX W HCPCS: Performed by: RADIOLOGY

## 2023-02-07 PROCEDURE — 38222 DX BONE MARROW BX & ASPIR: CPT

## 2023-02-07 PROCEDURE — 85610 PROTHROMBIN TIME: CPT

## 2023-02-07 PROCEDURE — 7100000010 HC PHASE II RECOVERY - FIRST 15 MIN

## 2023-02-07 PROCEDURE — 85730 THROMBOPLASTIN TIME PARTIAL: CPT

## 2023-02-07 PROCEDURE — 7100000011 HC PHASE II RECOVERY - ADDTL 15 MIN

## 2023-02-07 PROCEDURE — 77012 CT SCAN FOR NEEDLE BIOPSY: CPT

## 2023-02-07 PROCEDURE — 85027 COMPLETE CBC AUTOMATED: CPT

## 2023-02-07 RX ORDER — SODIUM CHLORIDE 0.9 % (FLUSH) 0.9 %
5-40 SYRINGE (ML) INJECTION PRN
Status: DISCONTINUED | OUTPATIENT
Start: 2023-02-07 | End: 2023-02-08 | Stop reason: HOSPADM

## 2023-02-07 RX ORDER — SODIUM CHLORIDE 0.9 % (FLUSH) 0.9 %
5-40 SYRINGE (ML) INJECTION 2 TIMES DAILY
Status: DISCONTINUED | OUTPATIENT
Start: 2023-02-07 | End: 2023-02-08 | Stop reason: HOSPADM

## 2023-02-07 RX ORDER — FENTANYL CITRATE 0.05 MG/ML
INJECTION, SOLUTION INTRAMUSCULAR; INTRAVENOUS
Status: COMPLETED | OUTPATIENT
Start: 2023-02-07 | End: 2023-02-07

## 2023-02-07 RX ORDER — MIDAZOLAM HYDROCHLORIDE 1 MG/ML
INJECTION INTRAMUSCULAR; INTRAVENOUS
Status: COMPLETED | OUTPATIENT
Start: 2023-02-07 | End: 2023-02-07

## 2023-02-07 RX ADMIN — MIDAZOLAM 1 MG: 1 INJECTION INTRAMUSCULAR; INTRAVENOUS at 10:56

## 2023-02-07 RX ADMIN — FENTANYL CITRATE 50 MCG: 50 INJECTION INTRAMUSCULAR; INTRAVENOUS at 10:56

## 2023-02-07 RX ADMIN — FENTANYL CITRATE 25 MCG: 50 INJECTION INTRAMUSCULAR; INTRAVENOUS at 11:03

## 2023-02-07 RX ADMIN — MIDAZOLAM 1 MG: 1 INJECTION INTRAMUSCULAR; INTRAVENOUS at 11:03

## 2023-02-07 NOTE — DISCHARGE INSTRUCTIONS
Bone Marrow Aspiration and Biopsy: What to Expect at Home  Your Recovery  The biopsy site may feel sore for several days. You may have a bruise on the site. It can help to walk, take pain medicine, and put ice packs on the site. You will probably be able to return to work and your usual activities the day after the procedure. Your doctor or nurse will call you with the results of your test.  This care sheet gives you a general idea about how long it will take for you to recover. But each person recovers at a different pace. Follow the steps below to get better as quickly as possible. How can you care for yourself at home? Activity    Rest when you feel tired. Getting enough sleep will help you recover. You may drive when you are no longer taking pain pills and can quickly move your foot from the gas pedal to the brake. You must also be able to sit comfortably for a long period of time, even if you do not plan to go far. You might get caught in traffic. Most people are able to return to work the day after the procedure. Medicines    Your doctor will tell you if and when you can restart your medicines. He or she will also give you instructions about taking any new medicines. If you stopped taking aspirin or some other blood thinner, your doctor will tell you when to start taking it again. Be safe with medicines. Take pain medicines exactly as directed. If the doctor gave you a prescription medicine for pain, take it as prescribed. If you are not taking a prescription pain medicine, take an over-the-counter medicine such as acetaminophen (Tylenol), ibuprofen (Advil, Motrin), or naproxen (Aleve). Read and follow all instructions on the label. Do not take two or more pain medicines at the same time unless the doctor told you to. Many pain medicines have acetaminophen, which is Tylenol. Too much acetaminophen (Tylenol) can be harmful.      If you think your pain medicine is making you sick to your stomach: Take your medicine after meals (unless your doctor has told you not to). Ask your doctor for a different pain medicine. If your doctor prescribed antibiotics, take them as directed. Do not stop taking them just because you feel better. Ice    Put ice or a cold pack on the biopsy site for 10 to 20 minutes at a time. Put a thin cloth between the ice and your skin. Follow-up care is a key part of your treatment and safety. Be sure to make and go to all appointments, and call your doctor if you are having problems. It's also a good idea to know your test results and keep a list of the medicines you take. When should you call for help? Call 911 anytime you think you may need emergency care. For example, call if:    You passed out (lost consciousness). Call your doctor now or seek immediate medical care if:    You have signs of infection, such as: Increased pain, swelling, warmth, or redness. Red streaks leading from the biopsy site. Pus draining from the biopsy site. Swollen lymph nodes in your neck, armpits, or groin. A fever. Watch closely for any changes in your health, and be sure to contact your doctor if:    You are not getting better as expected. Where can you learn more? Go to http://www.woods.com/ and enter E148 to learn more about \"Bone Marrow Aspiration and Biopsy: What to Expect at Home. \"  Current as of: May 4, 2022               Content Version: 13.5  © 2006-2022 [employer]. Care instructions adapted under license by [facility] ([facility]). If you have questions about a medical condition or this instruction, always ask your healthcare professional. [place] [address] any warranty or liability for your use of this information.

## 2023-02-07 NOTE — PROGRESS NOTES
Patient came down to special procedures for bone marrow biopsy and aspiration. Patient was educated about the amount of radiation used with today's procedure. Patient taken to Cat Scan. Patient positioned prone , secured on Cat Scan table with O2 and monitoring devices attached. Patient scanned and images reviewed by Dr Dimitrios Raya     Patient prepped secured and draped. Emotional support given. 1056 - sedation medication given    1058 - Procedure start /71 78 20 95% 2LPM/ nasal cannula    With the guidance of Cat Scan, needle inserted and two purple tubes and two green tubes filled and one core biopsies taken by Dr. Dimitrios Raya     Patient re-scanned and images reviewed by     Puncture site cleansed and dry sterile dressing of folded 4 x 4 and tegaderm applied to lower back. .     1113 - Procedure completed /56 65 16 96% 2LPM/Nasal cannula. Biopsy sample taken to laboratory. 1128 - 15 minutes post procedure /60 64 16 96% on room air. no complaints of pain at puncture site.  Dressing CDI    Total amount of sedation medication given during procedure: 2 mg of IV Versed and 75mcg of IV Fentanyl    Nurse to nurse called, spoke with Jerryl Homans, notified nurse of above information    Patient transported back to [facility]

## 2023-02-15 ENCOUNTER — HOSPITAL ENCOUNTER (OUTPATIENT)
Dept: CT IMAGING | Age: 63
Discharge: HOME | End: 2023-02-15
Payer: COMMERCIAL

## 2023-02-15 DIAGNOSIS — D47.2 MONOCLONAL GAMMOPATHIES: ICD-10-CM

## 2023-02-15 PROCEDURE — 72192 CT PELVIS W/O DYE: CPT

## 2023-02-16 ENCOUNTER — HOSPITAL ENCOUNTER (OUTPATIENT)
Dept: INFUSION THERAPY | Age: 63
Discharge: HOME | End: 2023-02-16
Payer: COMMERCIAL

## 2023-02-16 ENCOUNTER — OFFICE VISIT (OUTPATIENT)
Dept: ONCOLOGY | Age: 63
End: 2023-02-16
Payer: COMMERCIAL

## 2023-02-16 VITALS
OXYGEN SATURATION: 94 % | HEART RATE: 67 BPM | DIASTOLIC BLOOD PRESSURE: 87 MMHG | SYSTOLIC BLOOD PRESSURE: 150 MMHG | BODY MASS INDEX: 39.15 KG/M2 | HEIGHT: 60 IN | TEMPERATURE: 97.9 F | WEIGHT: 199.4 LBS

## 2023-02-16 DIAGNOSIS — D50.9 IRON DEFICIENCY ANEMIA, UNSPECIFIED IRON DEFICIENCY ANEMIA TYPE: Primary | ICD-10-CM

## 2023-02-16 DIAGNOSIS — D47.2 MONOCLONAL GAMMOPATHIES: ICD-10-CM

## 2023-02-16 LAB
ALBUMIN SERPL-MCNC: 3.6 G/DL (ref 3.5–5.2)
ALP BLD-CCNC: 103 U/L (ref 35–104)
ALT SERPL-CCNC: 13 U/L (ref 0–32)
ANION GAP SERPL CALCULATED.3IONS-SCNC: 7 MMOL/L (ref 7–16)
AST SERPL-CCNC: 13 U/L (ref 0–31)
BASOPHILS ABSOLUTE: 0.03 E9/L (ref 0–0.2)
BASOPHILS RELATIVE PERCENT: 0.5 % (ref 0–2)
BILIRUB SERPL-MCNC: <0.2 MG/DL (ref 0–1.2)
BUN BLDV-MCNC: 19 MG/DL (ref 6–23)
CALCIUM SERPL-MCNC: 9.1 MG/DL (ref 8.6–10.2)
CHLORIDE BLD-SCNC: 101 MMOL/L (ref 98–107)
CO2: 26 MMOL/L (ref 22–29)
CREAT SERPL-MCNC: 1.1 MG/DL (ref 0.5–1)
EOSINOPHILS ABSOLUTE: 0.11 E9/L (ref 0.05–0.5)
EOSINOPHILS RELATIVE PERCENT: 1.7 % (ref 0–6)
GFR SERPL CREATININE-BSD FRML MDRD: 56 ML/MIN/1.73
GLUCOSE BLD-MCNC: 131 MG/DL (ref 74–99)
HCT VFR BLD CALC: 39.5 % (ref 34–48)
HEMOGLOBIN: 12.7 G/DL (ref 11.5–15.5)
IMMATURE GRANULOCYTES #: 0.01 E9/L
IMMATURE GRANULOCYTES %: 0.2 % (ref 0–5)
LYMPHOCYTES ABSOLUTE: 2.14 E9/L (ref 1.5–4)
LYMPHOCYTES RELATIVE PERCENT: 32.2 % (ref 20–42)
MCH RBC QN AUTO: 27.8 PG (ref 26–35)
MCHC RBC AUTO-ENTMCNC: 32.2 % (ref 32–34.5)
MCV RBC AUTO: 86.4 FL (ref 80–99.9)
MONOCYTES ABSOLUTE: 0.75 E9/L (ref 0.1–0.95)
MONOCYTES RELATIVE PERCENT: 11.3 % (ref 2–12)
NEUTROPHILS ABSOLUTE: 3.61 E9/L (ref 1.8–7.3)
NEUTROPHILS RELATIVE PERCENT: 54.1 % (ref 43–80)
PDW BLD-RTO: 15.4 FL (ref 11.5–15)
PLATELET # BLD: 360 E9/L (ref 130–450)
PMV BLD AUTO: 10.4 FL (ref 7–12)
POTASSIUM SERPL-SCNC: 4.6 MMOL/L (ref 3.5–5)
RBC # BLD: 4.57 E12/L (ref 3.5–5.5)
SODIUM BLD-SCNC: 134 MMOL/L (ref 132–146)
TOTAL PROTEIN: 9.1 G/DL (ref 6.4–8.3)
WBC # BLD: 6.7 E9/L (ref 4.5–11.5)

## 2023-02-16 PROCEDURE — 85025 COMPLETE CBC W/AUTO DIFF WBC: CPT

## 2023-02-16 PROCEDURE — 99213 OFFICE O/P EST LOW 20 MIN: CPT

## 2023-02-16 PROCEDURE — 80053 COMPREHEN METABOLIC PANEL: CPT

## 2023-02-16 PROCEDURE — 36415 COLL VENOUS BLD VENIPUNCTURE: CPT

## 2023-02-16 NOTE — PROGRESS NOTES
[address]  [address]   Hematology/Oncology  Consult      Patient Name: Yolanda Ohara  YOB: 1960  PCP: Farshad Nguyen DO   Referring Provider:      Reason for Consultation:   Chief Complaint   Patient presents with    Follow-up     SHAUNNA      Interval history: No new complaints. It is post bone marrow biopsy    History of Present Illness:  57years old female referred by Dr. Dayton Rendon, for abnormal SPEP. She was admitted in August 2022 with congestive heart failure and NSTEMI. Found to have coronary artery disease, EF of 10%, left atrial thrombus. On GDMT and Eliquis and LifeVest.  Serum immunofixation showed monoclonal IgG G kappa. M spike 2.4. Serum free kappa was 22, lambda 14 with a normal ratio of 1.5. Random UPEP also showed monoclonal IgG kappa. Chemistry from 2 weeks ago show creatinine of 1.2, calcium 9.4. No recent hemoglobin available. Patient denies any bony pain. Cardiac MRI has been ordered to rule out cardiac amyloidosis. S/p bone marrow biopsy February 2023. Review of systems: Over 10 systems were reviewed and all were negative except as mentioned above.     Diagnostic Data:     Past Medical History:   Diagnosis Date    CHF (congestive heart failure) ([facility] [address]     Hyperlipidemia     Hypertension     Hypothyroidism 09/18/2022       Patient Active Problem List    Diagnosis Date Noted    Obesity (BMI 35.0-39.9 without comorbidity) 10/26/2022    Primary hypertension 10/15/2022    Iron deficiency anemia, unspecified 10/10/2022    Iron deficiency 09/19/2022    Class 2 obesity due to excess calories without serious comorbidity with body mass index (BMI) of 35.0 to 35.9 in adult 09/19/2022    HFrEF (heart failure with reduced ejection fraction) ([address] 09/19/2022    LV (left ventricular) mural thrombus 09/19/2022    Hypothyroidism 09/18/2022    CAD in native artery 08/29/2022    NICM (nonischemic cardiomyopathy) ([address] 08/29/2022 NSTEMI (non-ST elevated myocardial infarction) ([facility] [address] 2022        Past Surgical History:   Procedure Laterality Date    ANKLE SURGERY Right      &     DILATION AND CURETTAGE OF UTERUS         Family History  Family History   Problem Relation Age of Onset    Heart Failure Mother          at age 80 years, was a smoker    Atrial Fibrillation Mother     Heart Failure Father          at age 71, was a smoker    Diabetes Father     Heart Attack Sister          at age 48 years, smoked    Arthritis Sister         smoked    Diabetes type 2  Brother         smoked    Diabetes type 2  Brother         smoked    Obesity Brother     Diabetes Brother         did not smoke       Social History    TOBACCO:   reports that she quit smoking about 13 months ago. Her smoking use included cigarettes. She started smoking about 44 years ago. She has a 10.50 pack-year smoking history. She has never used smokeless tobacco.  ETOH:   reports current alcohol use. Home Medications  Prior to Admission medications    Medication Sig Start Date End Date Taking?  Authorizing Provider   empagliflozin (JARDIANCE) 10 MG tablet Take 1 tablet by mouth daily 2/10/23  Yes Navya Silverman MD   levothyroxine (SYNTHROID) 50 MCG tablet Take 1 tablet by mouth daily 22  Yes Jeison Dawson DO   furosemide (LASIX) 20 MG tablet Take 1 tablet by mouth daily as needed (Weight gain, shortness of breath, swelling) 22  Yes CHELSEA Castillo CNP   sacubitril-valsartan (ENTRESTO)  MG per tablet Take 1 tablet by mouth 2 times daily 22  Yes CHELSEA Castillo CNP   simvastatin (ZOCOR) 20 MG tablet Take 20 mg by mouth nightly   Yes Historical Provider, MD   apixaban (ELIQUIS) 5 MG TABS tablet Take 1 tablet by mouth 2 times daily 22  Yes Moe Milian MD   metoprolol succinate (TOPROL XL) 25 MG extended release tablet Take 1 tablet by mouth daily 22  Yes Moe Milian MD   spironolactone (ALDACTONE) 25 MG tablet Take 1 tablet by mouth daily 8/31/22  Yes Asael Vela MD   amLODIPine (NORVASC) 10 MG tablet Take 20 mg by mouth daily  8/30/22  Historical Provider, MD       Allergies  No Known Allergies    Review of Systems:      Objective  BP (!) 150/87   Pulse 67   Temp 97.9 °F (36.6 °C)   Ht 5' (1.524 m)   Wt 199 lb 6.4 oz (90.4 kg)   SpO2 94%   BMI 38.94 kg/m²     Physical Performance Status    Physical Exam:  General: AAO to person, place, time, and purpose, appears stated age, cooperative, no acute distress, pleasant   Head and neck : PERRLA, EOMI . Sclera non icteric. Oropharynx : Clear  Neck: no JVD,  no adenopathy, no carotid bruit  LYMPHATICS : No peripheral lymphadenopathy. Lungs: Clear to auscultation   Heart: Regular rate and regular rhythm, no murmur, normal S1, S2  Abdomen: Soft, non-tender;no masses, no organomegaly  Extremities: No edema,no cyanosis, no clubbing. Skin:  No rash. Neurologic:Cranial nerves grossly intact. No focal motor or sensory deficits .     Recent Laboratory Data-   Lab Results   Component Value Date    WBC 6.7 02/16/2023    HGB 12.7 02/16/2023    HCT 39.5 02/16/2023    MCV 86.4 02/16/2023     02/16/2023    LYMPHOPCT 32.2 02/16/2023    RBC 4.57 02/16/2023    MCH 27.8 02/16/2023    MCHC 32.2 02/16/2023    RDW 15.4 (H) 02/16/2023    NEUTOPHILPCT 54.1 02/16/2023    MONOPCT 11.3 02/16/2023    BASOPCT 0.5 02/16/2023    NEUTROABS 3.61 02/16/2023    LYMPHSABS 2.14 02/16/2023    MONOSABS 0.75 02/16/2023    EOSABS 0.11 02/16/2023    BASOSABS 0.03 02/16/2023       Lab Results   Component Value Date     02/16/2023    K 4.6 02/16/2023     02/16/2023    CO2 26 02/16/2023    BUN 19 02/16/2023    CREATININE 1.1 (H) 02/16/2023    GLUCOSE 131 (H) 02/16/2023    CALCIUM 9.1 02/16/2023    PROT 9.1 (H) 02/16/2023    LABALBU 3.6 02/16/2023    BILITOT <0.2 02/16/2023    ALKPHOS 103 02/16/2023    AST 13 02/16/2023    ALT 13 02/16/2023    LABGLOM 56 02/16/2023    GFRAA 55 10/14/2022       Lab Results   Component Value Date    IRON 46 01/19/2023    TIBC 420 01/19/2023    FERRITIN 18 01/19/2023           Radiology-    No results found. ASSESSMENT/PLAN :    Rissa Saab was seen today for follow-up. Diagnoses and all orders for this visit:    Iron deficiency anemia, unspecified iron deficiency anemia type  -     CBC with Auto Differential; Future  -     Comprehensive Metabolic Panel; Future  -     Amb External Referral To Gastroenterology    57years old female with congestive heart failure, coronary artery disease, atrial thrombus, on GDMT, Eliquis, LifeVest with monoclonal IgG kappa and serum and urine. S/p bone marrow biopsy and aspiration last week. Pathology pending. Hemoglobin creatinine calcium normal.  No bony pain. Skeletal survey survey showed no lytic lesions. Left sacroiliac sclerotic lesion was noted. CT pelvis did not show any bone lesions. M spike has been stable between 2.5 to 3 g/dL over the past 6 months. Serum free kappa stable at 80 kappa lambda ratio 5. 24-hour UPEP showed minimal total protein of 74 mg with an M spike of 2.2 mg.      Cardiac MRI not suggestive of cardiac amyloidosis. EF improved. Off of LifeVest now. Follows with Dr. Arnol Gil. Blood test from last month show low normal ferritin of 44, iron saturation of 11 TIBC of 400, suggestive of iron deficiency. Hemoglobin is normal.  Referred for GI work-u. Return to clinic in 3 weeks. Return in about 3 weeks (around 3/9/2023).      Shannen Sullivan MD   Electronically signed 2/16/2023 at 2:41 PM

## 2023-03-02 ENCOUNTER — OFFICE VISIT (OUTPATIENT)
Dept: FAMILY MEDICINE CLINIC | Age: 63
End: 2023-03-02
Payer: COMMERCIAL

## 2023-03-02 ENCOUNTER — HOSPITAL ENCOUNTER (OUTPATIENT)
Age: 63
Discharge: HOME | End: 2023-03-02
Payer: COMMERCIAL

## 2023-03-02 VITALS
HEIGHT: 60 IN | HEART RATE: 80 BPM | TEMPERATURE: 96.4 F | OXYGEN SATURATION: 97 % | DIASTOLIC BLOOD PRESSURE: 74 MMHG | WEIGHT: 201 LBS | RESPIRATION RATE: 16 BRPM | BODY MASS INDEX: 39.46 KG/M2 | SYSTOLIC BLOOD PRESSURE: 128 MMHG

## 2023-03-02 DIAGNOSIS — I50.20 HFREF (HEART FAILURE WITH REDUCED EJECTION FRACTION) (HCC): ICD-10-CM

## 2023-03-02 DIAGNOSIS — E03.9 HYPOTHYROIDISM, UNSPECIFIED TYPE: Primary | ICD-10-CM

## 2023-03-02 DIAGNOSIS — E78.5 HYPERLIPIDEMIA, UNSPECIFIED HYPERLIPIDEMIA TYPE: ICD-10-CM

## 2023-03-02 DIAGNOSIS — I25.10 CAD IN NATIVE ARTERY: ICD-10-CM

## 2023-03-02 DIAGNOSIS — E03.9 HYPOTHYROIDISM, UNSPECIFIED TYPE: ICD-10-CM

## 2023-03-02 LAB
CHOLESTEROL, TOTAL: 206 MG/DL (ref 0–199)
HDLC SERPL-MCNC: 45 MG/DL
LDL CHOLESTEROL CALCULATED: 139 MG/DL (ref 0–99)
TRIGL SERPL-MCNC: 111 MG/DL (ref 0–149)
TSH SERPL DL<=0.05 MIU/L-ACNC: 1.32 UIU/ML (ref 0.27–4.2)
VLDLC SERPL CALC-MCNC: 22 MG/DL

## 2023-03-02 PROCEDURE — 36415 COLL VENOUS BLD VENIPUNCTURE: CPT

## 2023-03-02 PROCEDURE — 3074F SYST BP LT 130 MM HG: CPT | Performed by: FAMILY MEDICINE

## 2023-03-02 PROCEDURE — 99214 OFFICE O/P EST MOD 30 MIN: CPT | Performed by: FAMILY MEDICINE

## 2023-03-02 PROCEDURE — 3078F DIAST BP <80 MM HG: CPT | Performed by: FAMILY MEDICINE

## 2023-03-02 PROCEDURE — 80061 LIPID PANEL: CPT

## 2023-03-02 PROCEDURE — 84443 ASSAY THYROID STIM HORMONE: CPT

## 2023-03-02 RX ORDER — SIMVASTATIN 20 MG
20 TABLET ORAL NIGHTLY
Qty: 90 TABLET | Refills: 1 | Status: SHIPPED | OUTPATIENT
Start: 2023-03-02 | End: 2023-05-31

## 2023-03-02 ASSESSMENT — PATIENT HEALTH QUESTIONNAIRE - PHQ9
SUM OF ALL RESPONSES TO PHQ QUESTIONS 1-9: 0
2. FEELING DOWN, DEPRESSED OR HOPELESS: 0
SUM OF ALL RESPONSES TO PHQ9 QUESTIONS 1 & 2: 0
1. LITTLE INTEREST OR PLEASURE IN DOING THINGS: 0
SUM OF ALL RESPONSES TO PHQ QUESTIONS 1-9: 0

## 2023-03-02 NOTE — PROGRESS NOTES
3/4/2023    [address]    Chief Complaint   Patient presents with    Hypertension    Hypothyroidism       HPI  History was obtained from patient. Leslie Hidalgo is a 57 y.o. female who presents today with the followin. Hypothyroidism, unspecified type    2. Hyperlipidemia, unspecified hyperlipidemia type    Patient is here for follow-up of congestive heart failure hypertension hypothyroidism and hypercholesterolemia. Patient is following with her cardiologist and taking her medication as prescribed. Patient denies any shortness of breath or chest pain. REVIEW OF SYMPTOMS    Review of Systems   Constitutional:  Negative for chills, fatigue and fever. HENT:  Negative for congestion, mouth sores, postnasal drip, rhinorrhea and sinus pain. Eyes:  Negative for photophobia, discharge and redness. Respiratory:  Negative for cough and shortness of breath. Cardiovascular:  Negative for chest pain. Gastrointestinal: Negative. Genitourinary:  Negative for difficulty urinating, dysuria, hematuria and urgency. Neurological:  Negative for headaches. Psychiatric/Behavioral:  Negative for sleep disturbance.       PAST MEDICAL HISTORY  Past Medical History:   Diagnosis Date    CHF (congestive heart failure) ([facility] [address]     Hyperlipidemia     Hypertension     Hypothyroidism 2022       FAMILY HISTORY  Family History   Problem Relation Age of Onset    Heart Failure Mother          at age 80 years, was a smoker    Atrial Fibrillation Mother     Heart Failure Father          at age 71, was a smoker    Diabetes Father     Heart Attack Sister          at age 48 years, smoked    Arthritis Sister         smoked    Diabetes type 2  Brother         smoked    Diabetes type 2  Brother         smoked    Obesity Brother     Diabetes Brother         did not smoke       SOCIAL HISTORY  Social History     Socioeconomic History    Marital status: Legally      Spouse name: 2nd : Mr. 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 She had had her first cigarette at age 19 years, for 42 years she had averaged 1/4 PPD for 10.5 pack-years   Vaping Use    Vaping Use: Never used   Substance and Sexual Activity    Alcohol use: Yes     Comment: occasional beer    Drug use: Never    Sexual activity: Not Currently     Comment: has no kids   Social History Narrative    CODE STATUS: Full Code    HEALTH CARE PROXY: her , Mr. Donnie Heaton, [phone]    AMBULATES: independently    DOMICILED: has stairs in the home, uses the stairs, lives with her  and her brother, has no home pets        No caffiene     Social Determinants of Health     Financial Resource Strain: Low Risk     Difficulty of Paying Living Expenses: Not very hard   Food Insecurity: No Food Insecurity    Worried About Running Out of Food in the Last Year: Never true    Ran Out of Food in the Last Year: Never true   Transportation Needs: No Transportation Needs    Lack of Transportation (Medical): No    Lack of Transportation (Non-Medical): No   Physical Activity: Sufficiently Active    Days of Exercise per Week: 7 days    Minutes of Exercise per Session: 30 min   Stress: No Stress Concern Present    Feeling of Stress : Not at all   Social Connections: Moderately Isolated    Frequency of Communication with Friends and Family: More than three times a week    Frequency of Social Gatherings with Friends and Family: Three times a week    Attends Church Services: Never    Active Member of Clubs or  Organizations: No    Attends Club or Organization Meetings: Never    Marital Status:    Intimate Partner Violence: Not At Risk    Fear of Current or Ex-Partner: No    Emotionally Abused: No    Physically Abused: No    Sexually Abused: No   Housing Stability: Unknown    Unable to Pay for Housing in the Last Year: No    Unstable Housing in the Last Year: No        SURGICAL HISTORY  Past Surgical History:   Procedure Laterality Date    ANKLE SURGERY Right     2012 & 2020    DILATION AND CURETTAGE OF UTERUS                   CURRENT MEDICATIONS  Current Outpatient Medications   Medication Sig Dispense Refill    simvastatin (ZOCOR) 20 MG tablet Take 1 tablet by mouth nightly 90 tablet 1    empagliflozin (JARDIANCE) 10 MG tablet Take 1 tablet by mouth daily 28 tablet 0    levothyroxine (SYNTHROID) 50 MCG tablet Take 1 tablet by mouth daily 90 tablet 1    furosemide (LASIX) 20 MG tablet Take 1 tablet by mouth daily as needed (Weight gain, shortness of breath, swelling) 90 tablet 2    sacubitril-valsartan (ENTRESTO)  MG per tablet Take 1 tablet by mouth 2 times daily 60 tablet 3    apixaban (ELIQUIS) 5 MG TABS tablet Take 1 tablet by mouth 2 times daily 180 tablet 2    metoprolol succinate (TOPROL XL) 25 MG extended release tablet Take 1 tablet by mouth daily 90 tablet 3    spironolactone (ALDACTONE) 25 MG tablet Take 1 tablet by mouth daily 90 tablet 3     No current facility-administered medications for this visit. ALLERGIES  No Known Allergies    PHYSICAL EXAM    /74   Pulse 80   Temp (!) 96.4 °F (35.8 °C)   Resp 16   Ht 5' (1.524 m)   Wt 201 lb (91.2 kg)   SpO2 97%   BMI 39.26 kg/m²     Physical Exam  Vitals and nursing note reviewed. Constitutional:       Appearance: Normal appearance. She is obese. Eyes:      General: No scleral icterus. Conjunctiva/sclera: Conjunctivae normal.   Neurological:      Mental Status: She is alert and oriented to person, place, and time. Psychiatric:         Mood and Affect: Mood normal.       ASSESSMENT & Idris Bradford was seen today for hypertension and hypothyroidism. Diagnoses and all orders for this visit:    Hypothyroidism, unspecified type  -     TSH; Future  Since last TSH was 23 1 in November of last year. We will recheck TSH today. Patient will continue with Synthroid 10 mcg daily. Hyperlipidemia, unspecified hyperlipidemia type  -     simvastatin (ZOCOR) 20 MG tablet; Take 1 tablet by mouth nightly  -     LIPID PANEL; Future  Patient's last LDL cholesterol was 113 in August last year. Patient will continue with her simvastatin and we will recheck a lipid panel. Coronary artery disease  We will continue with all her current medication including Entresto and Toprol Jardiance and Eliquis. Return in about 3 months (around 6/2/2023). Electronically signed by Jason Lenz.  Mony Velasquez DO on 3/4/2023

## 2023-03-04 ASSESSMENT — ENCOUNTER SYMPTOMS
COUGH: 0
SHORTNESS OF BREATH: 0
SINUS PAIN: 0
EYE REDNESS: 0
RHINORRHEA: 0
PHOTOPHOBIA: 0
GASTROINTESTINAL NEGATIVE: 1
EYE DISCHARGE: 0

## 2023-03-09 ENCOUNTER — HOSPITAL ENCOUNTER (OUTPATIENT)
Dept: INFUSION THERAPY | Age: 63
Discharge: HOME | End: 2023-03-09
Payer: COMMERCIAL

## 2023-03-09 ENCOUNTER — TELEPHONE (OUTPATIENT)
Dept: PHARMACY | Age: 63
End: 2023-03-09

## 2023-03-09 ENCOUNTER — OFFICE VISIT (OUTPATIENT)
Dept: ONCOLOGY | Age: 63
End: 2023-03-09
Payer: COMMERCIAL

## 2023-03-09 ENCOUNTER — TELEPHONE (OUTPATIENT)
Dept: ONCOLOGY | Age: 63
End: 2023-03-09

## 2023-03-09 VITALS
HEART RATE: 82 BPM | OXYGEN SATURATION: 98 % | TEMPERATURE: 97.9 F | DIASTOLIC BLOOD PRESSURE: 82 MMHG | SYSTOLIC BLOOD PRESSURE: 138 MMHG | BODY MASS INDEX: 39.27 KG/M2 | WEIGHT: 200 LBS | HEIGHT: 60 IN

## 2023-03-09 DIAGNOSIS — D50.9 IRON DEFICIENCY ANEMIA, UNSPECIFIED IRON DEFICIENCY ANEMIA TYPE: ICD-10-CM

## 2023-03-09 DIAGNOSIS — D47.2 MONOCLONAL GAMMOPATHIES: Primary | ICD-10-CM

## 2023-03-09 LAB
ALBUMIN SERPL-MCNC: 3.8 G/DL (ref 3.5–5.2)
ALP BLD-CCNC: 110 U/L (ref 35–104)
ALT SERPL-CCNC: 14 U/L (ref 0–32)
ANION GAP SERPL CALCULATED.3IONS-SCNC: 8 MMOL/L (ref 7–16)
AST SERPL-CCNC: 14 U/L (ref 0–31)
BASOPHILS ABSOLUTE: 0.06 E9/L (ref 0–0.2)
BASOPHILS RELATIVE PERCENT: 0.7 % (ref 0–2)
BILIRUB SERPL-MCNC: 0.4 MG/DL (ref 0–1.2)
BUN BLDV-MCNC: 34 MG/DL (ref 6–23)
CALCIUM SERPL-MCNC: 9.5 MG/DL (ref 8.6–10.2)
CHLORIDE BLD-SCNC: 101 MMOL/L (ref 98–107)
CO2: 25 MMOL/L (ref 22–29)
CREAT SERPL-MCNC: 1.2 MG/DL (ref 0.5–1)
EOSINOPHILS ABSOLUTE: 0.1 E9/L (ref 0.05–0.5)
EOSINOPHILS RELATIVE PERCENT: 1.2 % (ref 0–6)
GFR SERPL CREATININE-BSD FRML MDRD: 50 ML/MIN/1.73
GLUCOSE BLD-MCNC: 118 MG/DL (ref 74–99)
HCT VFR BLD CALC: 42 % (ref 34–48)
HEMOGLOBIN: 13.6 G/DL (ref 11.5–15.5)
IMMATURE GRANULOCYTES #: 0.02 E9/L
IMMATURE GRANULOCYTES %: 0.2 % (ref 0–5)
LYMPHOCYTES ABSOLUTE: 2.61 E9/L (ref 1.5–4)
LYMPHOCYTES RELATIVE PERCENT: 32.3 % (ref 20–42)
MCH RBC QN AUTO: 28 PG (ref 26–35)
MCHC RBC AUTO-ENTMCNC: 32.4 % (ref 32–34.5)
MCV RBC AUTO: 86.4 FL (ref 80–99.9)
MONOCYTES ABSOLUTE: 0.83 E9/L (ref 0.1–0.95)
MONOCYTES RELATIVE PERCENT: 10.3 % (ref 2–12)
NEUTROPHILS ABSOLUTE: 4.46 E9/L (ref 1.8–7.3)
NEUTROPHILS RELATIVE PERCENT: 55.3 % (ref 43–80)
PDW BLD-RTO: 16.3 FL (ref 11.5–15)
PLATELET # BLD: 337 E9/L (ref 130–450)
PMV BLD AUTO: 11.2 FL (ref 7–12)
POTASSIUM SERPL-SCNC: 4.5 MMOL/L (ref 3.5–5)
RBC # BLD: 4.86 E12/L (ref 3.5–5.5)
SODIUM BLD-SCNC: 134 MMOL/L (ref 132–146)
TOTAL PROTEIN: 9.6 G/DL (ref 6.4–8.3)
WBC # BLD: 8.1 E9/L (ref 4.5–11.5)

## 2023-03-09 PROCEDURE — 36415 COLL VENOUS BLD VENIPUNCTURE: CPT

## 2023-03-09 PROCEDURE — 80053 COMPREHEN METABOLIC PANEL: CPT

## 2023-03-09 PROCEDURE — 85025 COMPLETE CBC W/AUTO DIFF WBC: CPT

## 2023-03-09 PROCEDURE — 99212 OFFICE O/P EST SF 10 MIN: CPT

## 2023-03-09 NOTE — PROGRESS NOTES
[address]  [facility]   Hematology/Oncology  Consult      Patient Name: Juan Bolanos  YOB: 1960  PCP: Ismael Espinoza DO   Referring Provider:      Reason for Consultation:   Chief Complaint   Patient presents with    Follow-up     SHAUNNA      Interval history: No new complaints. History of Present Illness:  57years old female referred by Dr. Doreen Díaz, for abnormal SPEP. She was admitted in August 2022 with congestive heart failure and NSTEMI. Found to have coronary artery disease, EF of 10%, left atrial thrombus. On GDMT and Eliquis and LifeVest.  Serum immunofixation showed monoclonal IgG G kappa. M spike 2.4. Serum free kappa was 22, lambda 14 with a normal ratio of 1.5. Random UPEP also showed monoclonal IgG kappa. Chemistry from 2 weeks ago show creatinine of 1.2, calcium 9.4. No recent hemoglobin available. Patient denies any bony pain. Cardiac MRI has been ordered to rule out cardiac amyloidosis. S/p bone marrow biopsy February 2023. Review of systems: Over 10 systems were reviewed and all were negative except as mentioned above.     Diagnostic Data:     Past Medical History:   Diagnosis Date    CHF (congestive heart failure) ([facility] [address]     Hyperlipidemia     Hypertension     Hypothyroidism 09/18/2022       Patient Active Problem List    Diagnosis Date Noted    Obesity (BMI 35.0-39.9 without comorbidity) 10/26/2022    Primary hypertension 10/15/2022    Iron deficiency anemia, unspecified 10/10/2022    Iron deficiency 09/19/2022    Class 2 obesity due to excess calories without serious comorbidity with body mass index (BMI) of 35.0 to 35.9 in adult 09/19/2022    HFrEF (heart failure with reduced ejection fraction) ([facility] [address] 09/19/2022    LV (left ventricular) mural thrombus 09/19/2022    Hypothyroidism 09/18/2022    CAD in native artery 08/29/2022    NICM (nonischemic cardiomyopathy) ([address] 08/29/2022    [facility]-ST elevated [facility] 20[facility]        Past Surgical History:   Procedure Laterality Date    ANKLE SURGERY Right      &     DILATION AND CURETTAGE OF UTERUS         Family History  Family History   Problem Relation Age of Onset    Heart Failure Mother          at age 80 years, was a smoker    Atrial Fibrillation Mother     Heart Failure Father          at age 71, was a smoker    Diabetes Father     Heart Attack Sister          at age 48 years, smoked    Arthritis Sister         smoked    Diabetes type 2  Brother         smoked    Diabetes type 2  Brother         smoked    Obesity Brother     Diabetes Brother         did not smoke       Social History    TOBACCO:   reports that she quit smoking about 14 months ago. Her smoking use included cigarettes. She started smoking about 44 years ago. She has a 10.50 pack-year smoking history. She has never used smokeless tobacco.  ETOH:   reports current alcohol use. Home Medications  Prior to Admission medications    Medication Sig Start Date End Date Taking?  Authorizing Provider   sacubitril-valsartan (ENTRESTO)  MG per tablet Take 1 tablet by mouth 2 times daily 3/8/23   CHELSEA Monzon CNP   simvastatin (ZOCOR) 20 MG tablet Take 1 tablet by mouth nightly 3/2/23 5/31/23  Ana Laura Felipe DO   empagliflozin (JARDIANCE) 10 MG tablet Take 1 tablet by mouth daily 2/10/23   Hussain Davis MD   levothyroxine (SYNTHROID) 50 MCG tablet Take 1 tablet by mouth daily 22   Ana Laura Felipe DO   furosemide (LASIX) 20 MG tablet Take 1 tablet by mouth daily as needed (Weight gain, shortness of breath, swelling) 22   CHELSEA Monzon CNP   apixaban (ELIQUIS) 5 MG TABS tablet Take 1 tablet by mouth 2 times daily 22   Stevan Gutierrez MD   metoprolol succinate (TOPROL XL) 25 MG extended release tablet Take 1 tablet by mouth daily 22   Stevan Gutierrez MD   spironolactone (ALDACTONE) 25 MG tablet Take 1 tablet by mouth daily 8/31/22   Ellen Nunez MD   amLODIPine (NORVASC) 10 MG tablet Take 20 mg by mouth daily  8/30/22  Historical Provider, MD       Allergies  No Known Allergies    Review of Systems:      Objective  /82   Pulse 82   Temp 97.9 °F (36.6 °C)   Ht 5' (1.524 m)   Wt 200 lb (90.7 kg)   SpO2 98%   BMI 39.06 kg/m²     Physical Performance Status    Physical Exam:  General: AAO to person, place, time, and purpose, appears stated age, cooperative, no acute distress, pleasant   Head and neck : PERRLA, EOMI . Sclera non icteric. Oropharynx : Clear  Neck: no JVD,  no adenopathy, no carotid bruit  LYMPHATICS : No peripheral lymphadenopathy. Lungs: Clear to auscultation   Heart: Regular rate and regular rhythm, no murmur, normal S1, S2  Abdomen: Soft, non-tender;no masses, no organomegaly  Extremities: No edema,no cyanosis, no clubbing. Skin:  No rash. Neurologic:Cranial nerves grossly intact. No focal motor or sensory deficits .     Recent Laboratory Data-   Lab Results   Component Value Date    WBC 8.1 03/09/2023    HGB 13.6 03/09/2023    HCT 42.0 03/09/2023    MCV 86.4 03/09/2023     03/09/2023    LYMPHOPCT 32.3 03/09/2023    RBC 4.86 03/09/2023    MCH 28.0 03/09/2023    MCHC 32.4 03/09/2023    RDW 16.3 (H) 03/09/2023    NEUTOPHILPCT 55.3 03/09/2023    MONOPCT 10.3 03/09/2023    BASOPCT 0.7 03/09/2023    NEUTROABS 4.46 03/09/2023    LYMPHSABS 2.61 03/09/2023    MONOSABS 0.83 03/09/2023    EOSABS 0.10 03/09/2023    BASOSABS 0.06 03/09/2023       Lab Results   Component Value Date     03/09/2023    K 4.5 03/09/2023     03/09/2023    CO2 25 03/09/2023    BUN 34 (H) 03/09/2023    CREATININE 1.2 (H) 03/09/2023    GLUCOSE 118 (H) 03/09/2023    CALCIUM 9.5 03/09/2023    PROT 9.6 (H) 03/09/2023    LABALBU 3.8 03/09/2023    BILITOT 0.4 03/09/2023    ALKPHOS 110 (H) 03/09/2023    AST 14 03/09/2023    ALT 14 03/09/2023    LABGLOM 50 03/09/2023    GFRAA 55 10/14/2022       Lab Results   Component Value Date IRON 46 01/19/2023    TIBC 420 01/19/2023    FERRITIN 18 01/19/2023           Radiology-    No results found. ASSESSMENT/PLAN :    Sergio Bennett was seen today for follow-up. Diagnoses and all orders for this visit:    Monoclonal gammopathies  -     CBC with Auto Differential; Future  -     Comprehensive Metabolic Panel; Future  -     Electrophoresis Protein, Serum; Future  -     Cancel: IMMUNOFIXATION URINE RANDOM PROFILE; Future  -     Kappa/Lambda Quantitative Free Light Chains, Serum; Future  -     IMMUNOFIXATION URINE RANDOM PROFILE; Future    57years old female with congestive heart failure, coronary artery disease, atrial thrombus, on GDMT, Eliquis, LifeVest with monoclonal IgG kappa and serum and urine. S/p bone marrow biopsy and aspiration in February 2023. Pathology showed 30% kappa restricted plasma cells, normal female karyotype. FISH showed 13 monosomy as well as IgH abnormality. IgH rearrangement analysis could not be done due to limited specimen. Hemoglobin creatinine calcium normal.  No bony pain. Skeletal survey survey showed no lytic lesions. Left sacroiliac sclerotic lesion was noted. CT pelvis did not show any bone lesions. Patient has smoldering myeloma. Would need myeloma labs checked every 3 months. M spike has been stable between 2.5 to 3 g/dL over the past 6 months. Serum free kappa stable at 80 kappa lambda ratio 5. 24-hour UPEP showed minimal total protein of 74 mg with an M spike of 2.2 mg.      Cardiac MRI not suggestive of cardiac amyloidosis. EF improved. Off of LifeVest now. Follows with Dr. Claudia Payan. Blood test from last month show low normal ferritin of 44, iron saturation of 11 TIBC of 400, suggestive of iron deficiency. Hemoglobin is normal.  Referred for GI work-u. Return to clinic in 2 months. Return in about 2 months (around 5/9/2023).      Patric Churchill MD   Electronically signed 3/9/2023 at 3:23 PM

## 2023-03-09 NOTE — TELEPHONE ENCOUNTER
SW briefly met with pt during discussion of financial aid at [facility]. Pt was given the number for customer service to contact to inform them that she has started the process of financial aid and will need a adriano period of 30-60 days on her bills tier status during this time. Pt reported understanding at this time and was encouraged to reach out to this provider should any other needs arise.        Francesca Hook MSW, LISW-S  Oncology Social Worker

## 2023-03-17 ENCOUNTER — HOSPITAL ENCOUNTER (OUTPATIENT)
Dept: OTHER | Age: 63
Setting detail: THERAPIES SERIES
Discharge: HOME | End: 2023-03-17
Payer: COMMERCIAL

## 2023-03-17 VITALS
SYSTOLIC BLOOD PRESSURE: 135 MMHG | DIASTOLIC BLOOD PRESSURE: 64 MMHG | WEIGHT: 201 LBS | RESPIRATION RATE: 18 BRPM | OXYGEN SATURATION: 96 % | HEART RATE: 72 BPM | BODY MASS INDEX: 39.26 KG/M2

## 2023-03-17 LAB
ANION GAP SERPL CALCULATED.3IONS-SCNC: 11 MMOL/L (ref 7–16)
BNP BLD-MCNC: 20 PG/ML (ref 0–125)
BUN SERPL-MCNC: 32 MG/DL (ref 6–23)
CALCIUM SERPL-MCNC: 9.2 MG/DL (ref 8.6–10.2)
CHLORIDE SERPL-SCNC: 100 MMOL/L (ref 98–107)
CO2 SERPL-SCNC: 21 MMOL/L (ref 22–29)
CREAT SERPL-MCNC: 1.2 MG/DL (ref 0.5–1)
GLUCOSE SERPL-MCNC: 139 MG/DL (ref 74–99)
POTASSIUM SERPL-SCNC: 4.8 MMOL/L (ref 3.5–5)
SODIUM SERPL-SCNC: 132 MMOL/L (ref 132–146)

## 2023-03-17 PROCEDURE — 36415 COLL VENOUS BLD VENIPUNCTURE: CPT

## 2023-03-17 PROCEDURE — 80048 BASIC METABOLIC PNL TOTAL CA: CPT

## 2023-03-17 PROCEDURE — 83880 ASSAY OF NATRIURETIC PEPTIDE: CPT

## 2023-03-17 PROCEDURE — 99214 OFFICE O/P EST MOD 30 MIN: CPT

## 2023-03-17 NOTE — PROGRESS NOTES
Congestive Heart Failure [facility]     TITRATION PATIENT     Opal Eaton   1960    Referring Provider: Dr. Izabela Brink   Primary Care Physician: Dr. Emre Pineda DO  Cardiologist: Dr. Izabela Brink / Azeem Peralta APRN- CNP  Nephrologist: N/A    History of Present Illness:   Opal Eaton is a 57 y.o. female with a history of  HFimpHF  , most recent 50% EF Cardiac MRI (12/29/2022)     Patient Story:  She does not have dyspnea with exertion, shortness of breath, or decline in overall functional capacity. She does not have orthopnea, PND, nocturnal cough or hemoptysis. She does not have abdominal distention or bloating, early satiety, anorexia/change in appetite. She does has a good urinary response to oral diuretic ; when she takes the diuretic however she has not had to use PRN diuretic. She does not have lower extremity edema. She denies lightheadedness, dizziness. She denies palpitations, syncope or near syncope. She does not complain of chest pain, pressure, discomfort.      No Known Allergies    Current Outpatient Medications on File Prior to Encounter   Medication Sig Dispense Refill    sacubitril-valsartan (ENTRESTO)  MG per tablet Take 1 tablet by mouth 2 times daily 180 tablet 3    simvastatin (ZOCOR) 20 MG tablet Take 1 tablet by mouth nightly 90 tablet 1    empagliflozin (JARDIANCE) 10 MG tablet Take 1 tablet by mouth daily 28 tablet 0    levothyroxine (SYNTHROID) 50 MCG tablet Take 1 tablet by mouth daily 90 tablet 1    furosemide (LASIX) 20 MG tablet Take 1 tablet by mouth daily as needed (Weight gain, shortness of breath, swelling) 90 tablet 2    apixaban (ELIQUIS) 5 MG TABS tablet Take 1 tablet by mouth 2 times daily 180 tablet 2    metoprolol succinate (TOPROL XL) 25 MG extended release tablet Take 1 tablet by mouth daily 90 tablet 3    spironolactone (ALDACTONE) 25 MG tablet Take 1 tablet by mouth daily 90 tablet 3    [DISCONTINUED] amLODIPine (NORVASC) 10 MG tablet Take 20 mg by mouth daily       No current facility-administered medications on file prior to encounter.      Guideline directed medical:  ARNI/ACE I/ARB: Yes entresto  mg BID  Beta blocker:  Yes metoprolol succinate 25 mg daily   Aldosterone antagonist:  Yes spirolactone 25 mg daily   SGLT2i:  Yes jardiance 10 mg daily    Physical Examination:   /64   Pulse 72   Resp 18   Wt 201 lb (91.2 kg)   SpO2 96%   BMI 39.26 kg/m²     Assessment  Charting Type: Shift assessment (chf clinic)    Neurological  Level of Consciousness: Alert (0)    Respiratory  Respiratory Pattern: Regular  Respiratory Depth: Normal  Respiratory Quality/Effort: Unlabored  Chest Assessment: Chest expansion symmetrical  L Breath Sounds: Clear, Diminished  R Breath Sounds: Clear, Diminished         Cardiac  Cardiac Regularity: Regular  Heart Sounds: S1, S2  Cardiac Rhythm: Sinus rhythm    Rhythm Interpretation  Heart Rate: 72    Gastrointestinal  Abdominal (WDL): Within Defined Limits          Peripheral Vascular  Peripheral Vascular (WDL): Within Defined Limits    Genitourinary  Genitourinary (WDL): Within Defined Limits    Heart Rate: 72      LAB DATA:    Last 3 BMP      Sodium (mmol/L)   Date Value   03/17/2023 132   03/09/2023 134   02/16/2023 134     Potassium (mmol/L)   Date Value   03/17/2023 4.8   03/09/2023 4.5   02/16/2023 4.6     Potassium reflex Magnesium (mmol/L)   Date Value   08/29/2022 3.1 (L)   08/27/2022 3.9   08/26/2022 4.0     Chloride (mmol/L)   Date Value   03/17/2023 100   03/09/2023 101   02/16/2023 101     CO2 (mmol/L)   Date Value   03/17/2023 21 (L)   03/09/2023 25   02/16/2023 26     BUN (mg/dL)   Date Value   03/17/2023 32 (H)   03/09/2023 34 (H)   02/16/2023 19     Glucose (mg/dL)   Date Value   03/17/2023 139 (H)   03/09/2023 118 (H)   02/16/2023 131 (H)     Calcium (mg/dL)   Date Value   03/17/2023 9.2   03/09/2023 9.5   02/16/2023 9.1       Last 3 BNP       Pro-BNP (pg/mL)   Date Value   03/17/2023 20   01/16/2023 43   12/16/2022 51      CBC:   No results for input(s): WBC, HGB, PLT in the last 72 hours. BMP:    Recent Labs     03/17/23  1030      K 4.8      CO2 21*   BUN 32*   CREATININE 1.2*   GLUCOSE 139*       Hepatic:   No results for input(s): AST, ALT, ALB, BILITOT, ALKPHOS in the last 72 hours. Troponin: No results for input(s): TROPONINI in the last 72 hours. BNP: No results for input(s): BNP in the last 72 hours. Lipids: No results for input(s): CHOL, HDL in the last 72 hours. Invalid input(s): LDLCALCU  INR: No results for input(s): INR in the last 72 hours. WEIGHTS:    Wt Readings from Last 3 Encounters:   03/17/23 201 lb (91.2 kg)   03/09/23 200 lb (90.7 kg)   03/02/23 201 lb (91.2 kg)     TELEMETRY:  Cardiac Regularity: Regular  Cardiac Rhythm/Interpretation: SR    ASSESSMENT:  Kaya Rayo presents for CHF clinic follow up. Patient admits to not weighing herself daily. Encouraged patient on importance of weighing herself daily to monitor for when to use PRN diuretic. Lungs are clear, abdomen is soft, and there is no edema present in BLE. Denies any SOB, JACOBO or orthopnea. 3 months follow up. Interventions completed this visit:  IV diuretics given no  Lab work obtained yes, BNP BMP  Reviewed currently prescribed medications with patient, educated on importance of compliance and answered any questions regarding their medication  Educated on low sodium diet    PLAN:  Scheduled to follow up in CHF clinic on   Future Appointments   Date Time Provider Arcadio Phipps   5/11/2023  1:10 PM [address] [place] [address] [address]   5/11/2023  2:00 PM Sarah Valdez MD Blood Cancer [facility]   6/2/2023 10:30 AM [facility] [place]   6/6/2023  9:00 AM DO ALDEN Pacheco [facility]     Given clinic phone number and aware of signs and symptoms to call with any HF change in symptoms.

## 2023-05-23 DIAGNOSIS — E03.9 HYPOTHYROIDISM, UNSPECIFIED TYPE: ICD-10-CM

## 2023-05-23 RX ORDER — LEVOTHYROXINE SODIUM 0.05 MG/1
TABLET ORAL
Qty: 30 TABLET | Refills: 0 | Status: SHIPPED | OUTPATIENT
Start: 2023-05-23

## 2023-06-02 ENCOUNTER — HOSPITAL ENCOUNTER (OUTPATIENT)
Dept: OTHER | Age: 63
Setting detail: THERAPIES SERIES
Discharge: HOME | End: 2023-06-02

## 2023-06-29 ENCOUNTER — HOSPITAL ENCOUNTER (OUTPATIENT)
Dept: OTHER | Age: 63
Setting detail: THERAPIES SERIES
Discharge: HOME | End: 2023-06-29
Payer: COMMERCIAL

## 2023-06-29 VITALS
WEIGHT: 205 LBS | RESPIRATION RATE: 18 BRPM | SYSTOLIC BLOOD PRESSURE: 138 MMHG | DIASTOLIC BLOOD PRESSURE: 82 MMHG | OXYGEN SATURATION: 98 % | BODY MASS INDEX: 40.04 KG/M2 | HEART RATE: 70 BPM

## 2023-06-29 LAB
ANION GAP SERPL CALCULATED.3IONS-SCNC: 10 MMOL/L (ref 7–16)
BNP BLD-MCNC: 37 PG/ML (ref 0–125)
BUN SERPL-MCNC: 16 MG/DL (ref 6–23)
CALCIUM SERPL-MCNC: 8.8 MG/DL (ref 8.6–10.2)
CHLORIDE SERPL-SCNC: 99 MMOL/L (ref 98–107)
CO2 SERPL-SCNC: 23 MMOL/L (ref 22–29)
CREAT SERPL-MCNC: 1 MG/DL (ref 0.5–1)
GLUCOSE SERPL-MCNC: 118 MG/DL (ref 74–99)
POTASSIUM SERPL-SCNC: 4.4 MMOL/L (ref 3.5–5)
SODIUM SERPL-SCNC: 132 MMOL/L (ref 132–146)

## 2023-06-29 PROCEDURE — 83880 ASSAY OF NATRIURETIC PEPTIDE: CPT

## 2023-06-29 PROCEDURE — 99214 OFFICE O/P EST MOD 30 MIN: CPT

## 2023-06-29 PROCEDURE — 36415 COLL VENOUS BLD VENIPUNCTURE: CPT

## 2023-06-29 PROCEDURE — 80048 BASIC METABOLIC PNL TOTAL CA: CPT

## 2023-07-17 ENCOUNTER — HOSPITAL ENCOUNTER (OUTPATIENT)
Dept: OTHER | Age: 63
Setting detail: THERAPIES SERIES
Discharge: HOME | End: 2023-07-17
Payer: COMMERCIAL

## 2023-07-17 VITALS
HEART RATE: 70 BPM | HEIGHT: 60 IN | SYSTOLIC BLOOD PRESSURE: 120 MMHG | OXYGEN SATURATION: 98 % | BODY MASS INDEX: 40.01 KG/M2 | RESPIRATION RATE: 16 BRPM | DIASTOLIC BLOOD PRESSURE: 64 MMHG | WEIGHT: 203.8 LBS

## 2023-07-17 LAB
ANION GAP SERPL CALCULATED.3IONS-SCNC: 9 MMOL/L (ref 7–16)
BNP SERPL-MCNC: <36 PG/ML (ref 0–450)
BUN SERPL-MCNC: 28 MG/DL (ref 6–23)
CALCIUM SERPL-MCNC: 9.1 MG/DL (ref 8.6–10.2)
CHLORIDE SERPL-SCNC: 101 MMOL/L (ref 98–107)
CO2 SERPL-SCNC: 24 MMOL/L (ref 22–29)
CREAT SERPL-MCNC: 1.4 MG/DL (ref 0.5–1)
GFR SERPL CREATININE-BSD FRML MDRD: 41 ML/MIN/1.73M2
GLUCOSE SERPL-MCNC: 123 MG/DL (ref 74–99)
POTASSIUM SERPL-SCNC: 4.6 MMOL/L (ref 3.5–5)
SODIUM SERPL-SCNC: 134 MMOL/L (ref 132–146)

## 2023-07-17 PROCEDURE — 80048 BASIC METABOLIC PNL TOTAL CA: CPT

## 2023-07-17 PROCEDURE — 99214 OFFICE O/P EST MOD 30 MIN: CPT

## 2023-07-17 PROCEDURE — 99214 OFFICE O/P EST MOD 30 MIN: CPT | Performed by: NURSE PRACTITIONER

## 2023-07-17 PROCEDURE — 36415 COLL VENOUS BLD VENIPUNCTURE: CPT

## 2023-07-17 PROCEDURE — 83880 ASSAY OF NATRIURETIC PEPTIDE: CPT

## 2023-07-17 RX ORDER — SIMVASTATIN 20 MG
20 TABLET ORAL NIGHTLY
COMMUNITY

## 2023-07-17 ASSESSMENT — SLEEP AND FATIGUE QUESTIONNAIRES
HOW LIKELY ARE YOU TO NOD OFF OR FALL ASLEEP WHEN YOU ARE A PASSENGER IN A CAR FOR AN HOUR WITHOUT A BREAK: 0
HOW LIKELY ARE YOU TO NOD OFF OR FALL ASLEEP WHILE WATCHING TV: 1
HOW LIKELY ARE YOU TO NOD OFF OR FALL ASLEEP WHILE SITTING QUIETLY AFTER LUNCH WITHOUT ALCOHOL: 0
NECK CIRCUMFERENCE (INCHES): 16.75
HOW LIKELY ARE YOU TO NOD OFF OR FALL ASLEEP WHILE SITTING AND TALKING TO SOMEONE: 0
HOW LIKELY ARE YOU TO NOD OFF OR FALL ASLEEP WHILE LYING DOWN TO REST IN THE AFTERNOON WHEN CIRCUMSTANCES PERMIT: 1
HOW LIKELY ARE YOU TO NOD OFF OR FALL ASLEEP WHILE SITTING AND READING: 0
HOW LIKELY ARE YOU TO NOD OFF OR FALL ASLEEP IN A CAR, WHILE STOPPED FOR A FEW MINUTES IN TRAFFIC: 0
ESS TOTAL SCORE: 2
HOW LIKELY ARE YOU TO NOD OFF OR FALL ASLEEP WHILE SITTING INACTIVE IN A PUBLIC PLACE: 0

## 2023-07-17 ASSESSMENT — PATIENT HEALTH QUESTIONNAIRE - PHQ9
1. LITTLE INTEREST OR PLEASURE IN DOING THINGS: NOT AT ALL
SUM OF ALL RESPONSES TO PHQ9 QUESTIONS 1 & 2: 0
2. FEELING DOWN, DEPRESSED OR HOPELESS: NOT AT ALL

## 2023-07-17 ASSESSMENT — EJECTION FRACTION
EF_SOURCE: 2D ECHO
EF_VALUE: 10

## 2023-07-17 NOTE — DISCHARGE INSTRUCTIONS
Get blood work today   Continue current cardiac medications   Referral for sleep study  Follow up with Dr. Sydni Lerma in 4-5 months - sooner if needed   Weigh yourself daily    -Stay Hydrated, 64 oz     -Diet should sodium restricted to 2 grams    -Again watch your daily weight trends and if you gain water weight please follow below instructions.    -If you gain 3-5 pounds in 2-3 days OR notice that you are retaining fluid in anyway just like you did before then take an extra dose of your water pill (furosemide/Lasix) every day until you lose the weight or feel better.     -If you notice that you have taken more than 2 extra doses in 1 week then please call and let us know. -If at any time you feel that you are retaining fluid, your medications are not working, or you feel ill in anyway, then please call us for either same day appointment or the next day, and for instructions. Our goal is to keep you out of the emergency room and the hospital and we have ways to do it. You just need to call us in a timely manner.     -If you become sick for other reasons, and notice that you are not urinating as much, the urine is very dark, you have significant diarrhea or vomiting, then please DO NOT take your water pill and CALL US immediately.        HEART FAILURE ZONES   GREEN ZONE: All Clear- Your Symptoms Are Under Control    No shortness of breath   No weight gain of 3 pounds in 1 day or 5 pounds in 1 week   No increase in your usual amount of swelling   No chest pain    No decreased ability to maintain usual activity                                         This Means You Should:   Continue taking your medications as prescribed   Continue daily weights   Continue low salt diet   Keep all doctor appointments   YELLOW ZONE: Caution   Weight increases 3 pounds in 1 day or 5 pounds in 1 week   Increased cough, especially at night  Increased shortness of breath with activity   Increased shortness of breath laying down   Have any

## 2023-07-17 NOTE — PROGRESS NOTES
[facility] CHF Clinic  Office Visit         Reason for Visit: Heart failure    Primary Cardiologist: Dr. Rachael Garcia         History of Present Illness:     Ms. Alton Draper is a 57year old female with a PMHx of recently diagnosed nonischemic cardiomyopathy, chronic HFrEF, mild nonobstructive CAD of the RCA, LV apical thrombus, NSVT, HTN TN, HLD, hypothyroidism, obesity and prior tobacco abuse. She presented to the emergency room on 8/26/2022 with complaints of chest pain, increased shortness of breath, weight gain, lower extremity edema and orthopnea. She was admitted to the hospital for acute heart failure/NSTEMI. She was IV diuresed and during hospitalization underwent TTE that demonstrated LVEF 10%, stage II DD, preserved RV size, moderate function, moderate TR, mild-moderate MR, trace AI and LV thrombus. She was initiated on GDMT and once improved from a fluid standpoint underwent LHC that demonstrated mild nonobstructive CAD of the RCA. During procedure she had noted NSVT in the setting of hypokalemia, hypomagnesia and was initiated on tapering amiodarone and fitted with LifeVest.    Following hospitalization, she was seen in the CHF clinic GDMT was titrated to goal.  Further evaluation demonstrated positive SPEP/UPEP and she was referred to heme-onc/oncology. She underwent work-up per Dr. Candance Monica and her last office visit has smoldering myeloma. Work-up for ongoing anemia is pending. She did undergo cardiac MRI after being on goal GDMT with improvement of LV function, EF 50%, preserved RV size and function, epicardial myocardial fibrosis/scar likely due to prior myopericarditis. LifeVest was removed and she was maintained on GDMT. She presents today in follow-up, since her last visit and she denies any urgent care visits or hospitalizations. She been compliant with all of her current cardiac medications. She has stable weights and is monitoring her diet for sodium content.   She denies any dizziness,

## 2023-07-17 NOTE — FLOWSHEET NOTE
07/17/23 0828   Over the past 2 weeks, how often have you been bothered by any of the following problems?    Little interest or pleasure in doing things Not at all   Feeling down, depressed, or hopeless Not at all   Patient Health Questionnaire-2 Score 0

## 2023-07-18 ENCOUNTER — TELEPHONE (OUTPATIENT)
Dept: CARDIOLOGY CLINIC | Age: 63
End: 2023-07-18

## 2023-07-18 NOTE — TELEPHONE ENCOUNTER
----- Message from CHELSEA Menchaca - CNP sent at 7/17/2023  3:28 PM EDT -----  Labs reviewed  Please remind him to stay hydrated    Thank you

## 2023-07-20 NOTE — TELEPHONE ENCOUNTER
.Last seen 3/2/2023  Next appt 8/3/2023   Print rx and sign in order to send with patient assistance application

## 2023-07-31 ENCOUNTER — TELEPHONE (OUTPATIENT)
Dept: OTHER | Age: 63
End: 2023-07-31

## 2023-07-31 NOTE — TELEPHONE ENCOUNTER
She was instructed to STOP eliquis per Dr Harpreet Lomax. She will await coordinators from cardiology office to set echo.

## 2023-07-31 NOTE — TELEPHONE ENCOUNTER
----- Message from CHELSEA Alexander CNP sent at 7/31/2023  3:33 PM EDT -----  Jean Pierre Pate - please instruct patient to stop Eliquis   Shelley/Anitra - please schedule TTE with definity in 3 months per Dr. Siddhartha Mascorro     Thank you    ----- Message -----  From: Boni Murdock MD  Sent: 7/30/2023   5:22 PM EDT  To: CHELSEA Alexander CNP    OK to discontinue. Repeat limited echo in 3 months with definity. Thanks!  ----- Message -----  From: CHELSEA Alexander CNP  Sent: 7/17/2023   9:17 AM EDT  To: Boni Murdock MD    Please review - patient was treated with Eliquis for LV thrombus. EF improved and no mention of thrombus on CMRI. Ok to discontinue?

## 2023-07-31 NOTE — TELEPHONE ENCOUNTER
Loida Oconnor, APRN - CNP  Neeru Huber; Dustin Ribera; Pauly Smith, RL Talavera - please instruct patient to stop Eliquis   Shelley/Anitra - please schedule TTE with definity in 3 months per Dr. Geovanni Bishop     Thank you           Previous Messages       ----- Message -----   From: Jocelyn Gates MD   Sent: 7/30/2023   5:22 PM EDT   To: Loida Oconnor, APRN - CNP     OK to discontinue. Repeat limited echo in 3 months with definity. Thanks!   ----- Message -----   From: Loida Oconnor, APRN - CNP   Sent: 7/17/2023   9:17 AM EDT   To: Jocelyn Gates MD     Please review - patient was treated with Eliquis for LV thrombus. EF improved and no mention of thrombus on CMRI. Ok to discontinue?

## 2023-08-01 DIAGNOSIS — I50.20 HFREF (HEART FAILURE WITH REDUCED EJECTION FRACTION) (HCC): Primary | ICD-10-CM

## 2023-08-01 DIAGNOSIS — I42.8 NICM (NONISCHEMIC CARDIOMYOPATHY) (HCC): ICD-10-CM

## 2023-08-03 ENCOUNTER — OFFICE VISIT (OUTPATIENT)
Dept: FAMILY MEDICINE CLINIC | Age: 63
End: 2023-08-03
Payer: MEDICARE

## 2023-08-03 VITALS
BODY MASS INDEX: 40.44 KG/M2 | HEART RATE: 80 BPM | TEMPERATURE: 97 F | RESPIRATION RATE: 16 BRPM | HEIGHT: 60 IN | OXYGEN SATURATION: 98 % | DIASTOLIC BLOOD PRESSURE: 78 MMHG | WEIGHT: 206 LBS | SYSTOLIC BLOOD PRESSURE: 124 MMHG

## 2023-08-03 DIAGNOSIS — E03.9 HYPOTHYROIDISM, UNSPECIFIED TYPE: Primary | ICD-10-CM

## 2023-08-03 DIAGNOSIS — C90.00 MULTIPLE MYELOMA, REMISSION STATUS UNSPECIFIED (HCC): ICD-10-CM

## 2023-08-03 DIAGNOSIS — I25.10 CAD IN NATIVE ARTERY: ICD-10-CM

## 2023-08-03 DIAGNOSIS — E78.5 HYPERLIPIDEMIA, UNSPECIFIED HYPERLIPIDEMIA TYPE: ICD-10-CM

## 2023-08-03 DIAGNOSIS — I10 PRIMARY HYPERTENSION: ICD-10-CM

## 2023-08-03 PROCEDURE — 3078F DIAST BP <80 MM HG: CPT | Performed by: FAMILY MEDICINE

## 2023-08-03 PROCEDURE — 99214 OFFICE O/P EST MOD 30 MIN: CPT | Performed by: FAMILY MEDICINE

## 2023-08-03 PROCEDURE — 3074F SYST BP LT 130 MM HG: CPT | Performed by: FAMILY MEDICINE

## 2023-08-03 RX ORDER — LEVOTHYROXINE SODIUM 0.05 MG/1
50 TABLET ORAL DAILY
Qty: 90 TABLET | Refills: 1 | Status: SHIPPED | OUTPATIENT
Start: 2023-08-03

## 2023-08-03 ASSESSMENT — PATIENT HEALTH QUESTIONNAIRE - PHQ9
SUM OF ALL RESPONSES TO PHQ9 QUESTIONS 1 & 2: 0
SUM OF ALL RESPONSES TO PHQ QUESTIONS 1-9: 0
1. LITTLE INTEREST OR PLEASURE IN DOING THINGS: 0
SUM OF ALL RESPONSES TO PHQ QUESTIONS 1-9: 0
2. FEELING DOWN, DEPRESSED OR HOPELESS: 0

## 2023-08-03 NOTE — PROGRESS NOTES
Socioeconomic History    Marital status: Legally      Spouse name: 2nd : Mr. 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 She had had her first cigarette at age 22 years, for 42 years she had averaged 1/4 PPD for 10.5 pack-years   Vaping Use    Vaping Use: Never used   Substance and Sexual Activity    Alcohol use: Yes     Comment: occasional beer. Caffeine: rare and has decaf    Drug use: Never    Sexual activity: Defer     Comment: has no kids   Social History Narrative    CODE STATUS: Full Code    HEALTH CARE PROXY: her , Mr. Roxy Larkin, [phone]    AMBULATES: independently    DOMICILED: has stairs in the home, uses the stairs, lives with her  and her brother, has no home pets        No caffiene     Social Determinants of Health     Financial Resource Strain: Low Risk     Difficulty of Paying Living Expenses: Not very hard   Food Insecurity: No Food Insecurity    Worried About Running Out of Food in the Last Year: Never true    [address] in the Last Year: Never true   Transportation Needs: No Transportation Needs    Lack of Transportation (Medical): No    Lack of Transportation (Non-Medical): No   Physical Activity: Sufficiently Active    Days of Exercise per Week: 7 days    Minutes of Exercise per Session: 30 min   Stress: No Stress Concern Present    Feeling of Stress : Not at all   Social Connections:  Moderately Isolated    Frequency of Communication with Friends and Family: More

## 2023-08-14 PROBLEM — E78.5 HYPERLIPIDEMIA: Status: ACTIVE | Noted: 2023-08-14

## 2023-08-14 ASSESSMENT — ENCOUNTER SYMPTOMS
PHOTOPHOBIA: 0
COUGH: 0
GASTROINTESTINAL NEGATIVE: 1
EYE REDNESS: 0
RHINORRHEA: 0
SINUS PAIN: 0
SHORTNESS OF BREATH: 0
EYE DISCHARGE: 0

## 2023-09-01 ENCOUNTER — HOSPITAL ENCOUNTER (OUTPATIENT)
Dept: OTHER | Age: 63
Setting detail: THERAPIES SERIES
Discharge: HOME | End: 2023-09-01

## 2023-09-15 ENCOUNTER — HOSPITAL ENCOUNTER (OUTPATIENT)
Dept: OTHER | Age: 63
Setting detail: THERAPIES SERIES
Discharge: HOME | End: 2023-09-15
Payer: MEDICARE

## 2023-09-15 VITALS
SYSTOLIC BLOOD PRESSURE: 132 MMHG | RESPIRATION RATE: 18 BRPM | OXYGEN SATURATION: 96 % | WEIGHT: 207.2 LBS | DIASTOLIC BLOOD PRESSURE: 70 MMHG | BODY MASS INDEX: 40.47 KG/M2

## 2023-09-15 LAB
ANION GAP SERPL CALCULATED.3IONS-SCNC: 10 MMOL/L (ref 7–16)
BNP SERPL-MCNC: <36 PG/ML (ref 0–450)
BUN SERPL-MCNC: 15 MG/DL (ref 6–23)
CALCIUM SERPL-MCNC: 8.7 MG/DL (ref 8.6–10.2)
CHLORIDE SERPL-SCNC: 102 MMOL/L (ref 98–107)
CO2 SERPL-SCNC: 23 MMOL/L (ref 22–29)
CREAT SERPL-MCNC: 1 MG/DL (ref 0.5–1)
GFR SERPL CREATININE-BSD FRML MDRD: >60 ML/MIN/1.73M2
GLUCOSE SERPL-MCNC: 127 MG/DL (ref 74–99)
POTASSIUM SERPL-SCNC: 4.1 MMOL/L (ref 3.5–5)
SODIUM SERPL-SCNC: 135 MMOL/L (ref 132–146)

## 2023-09-15 PROCEDURE — 83880 ASSAY OF NATRIURETIC PEPTIDE: CPT

## 2023-09-15 PROCEDURE — 80048 BASIC METABOLIC PNL TOTAL CA: CPT

## 2023-09-15 PROCEDURE — 36415 COLL VENOUS BLD VENIPUNCTURE: CPT

## 2023-09-15 PROCEDURE — 99214 OFFICE O/P EST MOD 30 MIN: CPT

## 2023-09-15 NOTE — PROGRESS NOTES
Quality/Effort: Unlabored  Chest Assessment: Chest expansion symmetrical  L Breath Sounds: Clear  R Breath Sounds: Clear        Cardiac  Cardiac Regularity: Regular  Heart Sounds: S1, S2  Cardiac Rhythm: Sinus rhythm        Gastrointestinal  Abdominal (WDL): Within Defined Limits         Peripheral Vascular  Peripheral Vascular (WDL): Within Defined Limits           Genitourinary  Genitourinary (WDL): Within Defined Limits  Psychosocial  Psychosocial (WDL): Within Defined Limits                                LAB DATA:  BMP:  Sodium (mmol/L)   Date Value   09/15/2023 135   07/17/2023 134   06/29/2023 132     Potassium (mmol/L)   Date Value   09/15/2023 4.1   07/17/2023 4.6   06/29/2023 4.4     Potassium reflex Magnesium (mmol/L)   Date Value   08/29/2022 3.1 (L)   08/27/2022 3.9   08/26/2022 4.0     Chloride (mmol/L)   Date Value   09/15/2023 102   07/17/2023 101   06/29/2023 99     CO2 (mmol/L)   Date Value   09/15/2023 23   07/17/2023 24   06/29/2023 23     BUN (mg/dL)   Date Value   09/15/2023 15   07/17/2023 28 (H)   06/29/2023 16     Creatinine (mg/dL)   Date Value   09/15/2023 1.0   07/17/2023 1.4 (H)   06/29/2023 1.0     Glucose (mg/dL)   Date Value   09/15/2023 127 (H)   07/17/2023 123 (H)   06/29/2023 118 (H)     Calcium (mg/dL)   Date Value   09/15/2023 8.7   07/17/2023 9.1   06/29/2023 8.8     BNP:  Pro-BNP (pg/mL)   Date Value   09/15/2023 <36   07/17/2023 <36   06/29/2023 37      CBC:  WBC (E9/L)   Date Value   03/09/2023 8.1     Hemoglobin (g/dL)   Date Value   03/09/2023 13.6     Hematocrit (%)   Date Value   03/09/2023 42.0     Platelets (G6/U)   Date Value   03/09/2023 337     Iron Studies:  Ferritin (ng/mL)   Date Value   01/19/2023 18     Iron (mcg/dL)   Date Value   01/19/2023 46     TIBC (mcg/dL)   Date Value   01/19/2023 420     Hepatic:  AST (U/L)   Date Value   03/09/2023 14     ALT (U/L)   Date Value   03/09/2023 14     Total Bilirubin (mg/dL)   Date Value   03/09/2023 0.4     Alkaline

## 2023-10-01 LAB
LEFT VENTRICULAR EJECTION FRACTION HIGH VALUE: 60 %
LV EF: 55 %

## 2023-10-19 NOTE — PROGRESS NOTES
Department of Internal Medicine  General Internal Medicine  Attending Progress Note  Chief Complaint   Patient presents with    Shortness of Breath     PALE/ X 2 DAYS/ SLIGHT VOMITING     SUBJECTIVE:    Reports that she is feeling better. Denied fever and chills. Aware of plans to continue lasix and possible transfer to [facility] for ischemic evaluation.      OBJECTIVE      Medications    Current Facility-Administered Medications: [START ON 8/29/2022] spironolactone (ALDACTONE) tablet 12.5 mg, 12.5 mg, Oral, Daily  sodium chloride flush 0.9 % injection 5-40 mL, 5-40 mL, IntraVENous, 2 times per day  sodium chloride flush 0.9 % injection 5-40 mL, 5-40 mL, IntraVENous, PRN  0.9 % sodium chloride infusion, , IntraVENous, PRN  aspirin chewable tablet 81 mg, 81 mg, Oral, Daily  magnesium sulfate 2000 mg in 50 mL IVPB premix, 2,000 mg, IntraVENous, PRN  potassium chloride (KLOR-CON M) extended release tablet 40 mEq, 40 mEq, Oral, PRN **OR** potassium bicarb-citric acid (EFFER-K) effervescent tablet 40 mEq, 40 mEq, Oral, PRN **OR** potassium chloride 10 mEq/100 mL IVPB (Peripheral Line), 10 mEq, IntraVENous, PRN  polyethylene glycol (GLYCOLAX) packet 17 g, 17 g, Oral, Daily PRN  melatonin disintegrating tablet 5 mg, 5 mg, Oral, Nightly PRN  calcium carbonate (TUMS) chewable tablet 500 mg, 500 mg, Oral, TID PRN  heparin 25,000 units in dextrose 5% 250 mL (premix) infusion, 5-30 Units/kg/hr, IntraVENous, Continuous  furosemide (LASIX) injection 40 mg, 40 mg, IntraVENous, BID  sacubitril-valsartan (ENTRESTO) 24-26 MG per tablet 1 tablet, 1 tablet, Oral, BID  carvedilol (COREG) tablet 6.25 mg, 6.25 mg, Oral, BID WC  heparin (porcine) injection 4,000 Units, 4,000 Units, IntraVENous, PRN  heparin (porcine) injection 2,000 Units, 2,000 Units, IntraVENous, PRN  ondansetron (ZOFRAN-ODT) disintegrating tablet 4 mg, 4 mg, Oral, Q8H PRN **OR** ondansetron (ZOFRAN) injection 4 mg, 4 mg, IntraVENous, Q6H PRN  acetaminophen (TYLENOL) tablet Subjective   Patient ID: Radhika is a 75 year old female.    Chief Complaint   Patient presents with   • Pre-Op Exam     Left inferior oblique weakening on  at [facility] performed by Dr. Kathe Anderson   • Office Visit   • Medication Management   • Wrist Pain     In the R wrist from the thumb that radiates down    • Hair/Scalp Problem     Hair thinning and nail problem      HPIdiploplia eye muscle surgery  Mother  101 and grand son improving so time to care for herself  Some aging for skin /hiar/ benign      Visit Vitals  /64 (BP Location: LUE - Left upper extremity, Patient Position: Sitting, Cuff Size: Regular)   Pulse 70   Temp 99.2 °F (37.3 °C) (Tympanic)   Resp 16   Ht 5' 6\" (1.676 m)   Wt 74.8 kg (165 lb)   SpO2 98%   BMI 26.63 kg/m²        has a past medical history of Elevated C-reactive protein (CRP), Elevated coronary artery calcium score (2022), Essential hypertension, Hyperlipidemia, Rheumatic fever, and Vitamin D deficiency.  Patient Active Problem List   Diagnosis   • Hypertension, benign   • Hypercholesteremia   • Vitamin D deficiency   • Pre-op evaluation   • Status post total left knee replacement   • Elevated coronary artery calcium score   • Hyperglycemia   • History of rheumatic fever      has a past surgical history that includes Case Request - Clinic to Cath/Vasc; Cataract extraction, bilateral; and Knee surgery.  family history includes Coronary Artery Disease in an other family member.  has a current medication list which includes the following prescription(s): amlodipine, benazepril, atorvastatin, penicillin v potassium, potassium & magnesium aspartat, calcium citrate, multiple vitamins-minerals, ibuprofen, aspirin, and vitamin d3.  ALLERGIES:  Shellfish allergy   (food or med)    Review of Systems   Constitutional: Negative.         Generally happy in life.  We will get some time to get a result.    Discussed the fact that her   650 mg, 650 mg, Oral, Q6H PRN **OR** acetaminophen (TYLENOL) suppository 650 mg, 650 mg, Rectal, Q6H PRN  atorvastatin (LIPITOR) tablet 80 mg, 80 mg, Oral, Nightly  nitroGLYCERIN (NITROSTAT) SL tablet 0.4 mg, 0.4 mg, SubLINGual, Q5 Min PRN  Physical    VITALS:  /67   Pulse 82   Temp 98 °F (36.7 °C) (Oral)   Resp 18   Ht 5' (1.524 m)   Wt 188 lb (85.3 kg)   SpO2 95%   BMI 36.72 kg/m²   CONSTITUTIONAL:  awake, alert, and cooperative  EYES:  extra-ocular muscles intact and vision intact  ENT:  normocepalic, without obvious abnormality  NECK:  supple, symmetrical, trachea midline  HEMATOLOGIC/LYMPHATICS:  no cervical lymphadenopathy  LUNGS:  no increased work of breathing, no retractions, and clear to auscultation  CARDIOVASCULAR:  normal apical pulses and normal S1 and S2  ABDOMEN:  normal bowel sounds, non-distended, and non-tender  MUSCULOSKELETAL:  bilateral lower extremity edema  NEUROLOGIC:  Mental Status Exam:  Level of Alertness:   awake  Orientation:   person, place, time  SKIN:  no bruising or bleeding  Data    CBC:   Lab Results   Component Value Date/Time    WBC 7.1 08/27/2022 10:20 AM    RBC 5.06 08/27/2022 10:20 AM    HGB 14.3 08/27/2022 10:20 AM    HCT 42.8 08/27/2022 10:20 AM    MCV 84.6 08/27/2022 10:20 AM    MCH 28.3 08/27/2022 10:20 AM    MCHC 33.4 08/27/2022 10:20 AM    RDW 16.3 08/27/2022 10:20 AM     08/27/2022 10:20 AM    MPV 11.3 08/27/2022 10:20 AM     BMP:    Lab Results   Component Value Date/Time     08/27/2022 10:20 AM    K 3.9 08/27/2022 10:20 AM     08/27/2022 10:20 AM    CO2 18 08/27/2022 10:20 AM    BUN 14 08/27/2022 10:20 AM    LABALBU 3.6 08/26/2022 11:41 PM    CREATININE 0.9 08/27/2022 10:20 AM    CALCIUM 9.0 08/27/2022 10:20 AM    GFRAA >60 08/27/2022 10:20 AM    LABGLOM >60 08/27/2022 10:20 AM    GLUCOSE 157 08/27/2022 10:20 AM       ASSESSMENT AND PLAN    Brief Summary of stay:  Mrs. Vaughan was admitted with dyspnea.  Further work up was seems to have some memory loss, not we will continue to observe at for any intervention if needed   HENT:        Bilateral hearing aids   Eyes:        As per ophthalmology for repair of muscle imbalance   Respiratory: Negative.    Cardiovascular: Negative.    Gastrointestinal: Negative.    Musculoskeletal:        Likely need her right knee replacement   Skin: Negative.         No findings at this time examination skin observe for any changes as per history   Neurological: Negative.    Psychiatric/Behavioral: Negative.        Objective   Physical Exam  Constitutional:       Appearance: She is normal weight.   HENT:      Head: Atraumatic.      Ears:      Comments: Bilateral hearing aids     Mouth/Throat:      Mouth: Mucous membranes are moist.      Neck: Normal range of motion.   Eyes:      Comments: As per ophthalmology with muscle imbalance   Cardiovascular:      Rate and Rhythm: Normal rate and regular rhythm.      Pulses: Normal pulses.      Heart sounds: Normal heart sounds.      Comments: There is a history of rheumatic fever in late adulthood on antibiotics to continue as at present  Pulmonary:      Effort: Pulmonary effort is normal.      Breath sounds: Normal breath sounds.   Abdominal:      Palpations: Abdomen is soft.   Musculoskeletal:         General: Normal range of motion.      Comments: Crepitation noted in the right knee knee but no acute swelling   Lymphadenopathy:      Cervical: No cervical adenopathy.   Skin:     General: Skin is warm.      Findings: No rash.   Neurological:      Mental Status: She is alert and oriented to person, place, and time.   Psychiatric:         Mood and Affect: Mood normal.      Comments: Feels ready to take care of herself and some other family obligations have been         Patient Active Problem List   Diagnosis   • Hypertension, benign   • Hypercholesteremia   • Vitamin D deficiency   • Pre-op evaluation   • Status post total left knee replacement   • Elevated coronary  artery calcium score   • Hyperglycemia   • History of rheumatic fever         Assessment and Plan   Diagnoses and all orders for this visit:  Pre-op evaluation  Post-menopause  -     BD DEXA SCAN AXIAL SKELETON; Future  Hypertension, benign  Hypercholesteremia  Elevated coronary artery calcium score  Hyperglycemia  Vitamin D deficiency  Status post total left knee replacement  History of rheumatic fever      1. Pre-op evaluation  Patient is cleared for surgery  2. Post-menopause  Lexiscan repeat routine well-controlled diet and activity  -     BD DEXA SCAN AXIAL SKELETON  3. Hypertension, benign  Well-controlled blood pressure  4. Hypercholesteremia  Well-controlled close throat issues  5. Elevated coronary artery calcium score  Under care of of cardiology  6. Hyperglycemia  Stable on recent labs  7. Vitamin D deficiency  Replacement values seems benign  8. Status post total left knee replacement  No acute problems  9. History of rheumatic fever  Current findings continue antibiotics         Schedule follow up: in 6 months           positive for elevated trop. This was diagnosed as NSTEMI. She was placed on heparin drip. Additional work up with echo showed EF of 10% with LV thrombus. Cardiology is following she is on CHF Core meds. [address] rehab to be added. Ischemic work up to be done at [facility]. Already started on Entresto and [address] at discharge. 1.  NSTEMI (non-ST elevated myocardial infarction): continue IV heparin drip. 2.  Acute combined systolic and diastolic congestive heart failure: continue lasix 40 mg IV BID. CHF core measures. Low Na diet. Good urine output. Total of negative 6.6 L since admission. Ischemic work up at [facility]    Intake/Output Summary (Last 24 hours) at 8/28/2022 1353  Last data filed at 8/28/2022 1316  Gross per 24 hour   Intake 870 ml   Output 4100 ml   Net -3230 ml     3. Hypertension Essential: good control. Continue medication per Cardiology recommendations    4. Hyperlipidemia: on statin    5.   Morbid Obesity: continue lifestyle modifications

## 2023-10-20 ENCOUNTER — HOSPITAL ENCOUNTER (OUTPATIENT)
Dept: CARDIOLOGY | Age: 63
End: 2023-10-20
Payer: MEDICARE

## 2023-10-20 DIAGNOSIS — I42.8 NICM (NONISCHEMIC CARDIOMYOPATHY) (HCC): ICD-10-CM

## 2023-10-20 DIAGNOSIS — I50.20 HFREF (HEART FAILURE WITH REDUCED EJECTION FRACTION) (HCC): ICD-10-CM

## 2023-10-20 LAB
LEFT VENTRICULAR EJECTION FRACTION HIGH VALUE: 65 %
LV EF: 60 %

## 2023-10-20 PROCEDURE — C8929 TTE W OR WO FOL WCON,DOPPLER: HCPCS

## 2023-10-20 PROCEDURE — 6360000004 HC RX CONTRAST MEDICATION: Performed by: NURSE PRACTITIONER

## 2023-10-20 PROCEDURE — 2580000003 HC RX 258: Performed by: NURSE PRACTITIONER

## 2023-10-20 RX ORDER — SODIUM CHLORIDE 0.9 % (FLUSH) 0.9 %
10 SYRINGE (ML) INJECTION PRN
Status: DISCONTINUED | OUTPATIENT
Start: 2023-10-20 | End: 2023-10-23 | Stop reason: HOSPADM

## 2023-10-20 RX ADMIN — SODIUM CHLORIDE, PRESERVATIVE FREE 10 ML: 5 INJECTION INTRAVENOUS at 07:40

## 2023-10-20 RX ADMIN — PERFLUTREN 1.65 MG: 6.52 INJECTION, SUSPENSION INTRAVENOUS at 07:40

## 2023-10-20 RX ADMIN — SODIUM CHLORIDE, PRESERVATIVE FREE 10 ML: 5 INJECTION INTRAVENOUS at 07:43

## 2023-11-07 ENCOUNTER — HOSPITAL ENCOUNTER (OUTPATIENT)
Age: 63
Discharge: HOME | End: 2023-11-07
Payer: MEDICARE

## 2023-11-07 ENCOUNTER — OFFICE VISIT (OUTPATIENT)
Dept: FAMILY MEDICINE CLINIC | Age: 63
End: 2023-11-07

## 2023-11-07 VITALS
RESPIRATION RATE: 16 BRPM | HEIGHT: 60 IN | TEMPERATURE: 97 F | WEIGHT: 208 LBS | BODY MASS INDEX: 40.84 KG/M2 | OXYGEN SATURATION: 98 % | DIASTOLIC BLOOD PRESSURE: 70 MMHG | HEART RATE: 72 BPM | SYSTOLIC BLOOD PRESSURE: 120 MMHG

## 2023-11-07 DIAGNOSIS — Z00.00 WELCOME TO MEDICARE PREVENTIVE VISIT: Primary | ICD-10-CM

## 2023-11-07 DIAGNOSIS — Z78.0 ENCOUNTER FOR OSTEOPOROSIS SCREENING IN ASYMPTOMATIC POSTMENOPAUSAL PATIENT: ICD-10-CM

## 2023-11-07 DIAGNOSIS — Z13.820 ENCOUNTER FOR OSTEOPOROSIS SCREENING IN ASYMPTOMATIC POSTMENOPAUSAL PATIENT: ICD-10-CM

## 2023-11-07 DIAGNOSIS — I25.10 CAD IN NATIVE ARTERY: ICD-10-CM

## 2023-11-07 DIAGNOSIS — E78.5 HYPERLIPIDEMIA, UNSPECIFIED HYPERLIPIDEMIA TYPE: ICD-10-CM

## 2023-11-07 DIAGNOSIS — Z12.11 COLON CANCER SCREENING: ICD-10-CM

## 2023-11-07 DIAGNOSIS — R73.9 HYPERGLYCEMIA: ICD-10-CM

## 2023-11-07 DIAGNOSIS — Z78.0 POST-MENOPAUSAL: ICD-10-CM

## 2023-11-07 DIAGNOSIS — E03.9 HYPOTHYROIDISM, UNSPECIFIED TYPE: ICD-10-CM

## 2023-11-07 LAB
CHOLEST SERPL-MCNC: 176 MG/DL
HDLC SERPL-MCNC: 40 MG/DL
LDLC SERPL CALC-MCNC: 112 MG/DL
TRIGL SERPL-MCNC: 118 MG/DL
VLDLC SERPL CALC-MCNC: 24 MG/DL

## 2023-11-07 PROCEDURE — 80061 LIPID PANEL: CPT

## 2023-11-07 PROCEDURE — 36415 COLL VENOUS BLD VENIPUNCTURE: CPT

## 2023-11-07 RX ORDER — LEVOTHYROXINE SODIUM 0.05 MG/1
50 TABLET ORAL DAILY
Qty: 90 TABLET | Refills: 1 | Status: SHIPPED | OUTPATIENT
Start: 2023-11-07

## 2023-11-07 ASSESSMENT — PATIENT HEALTH QUESTIONNAIRE - PHQ9
SUM OF ALL RESPONSES TO PHQ QUESTIONS 1-9: 0
1. LITTLE INTEREST OR PLEASURE IN DOING THINGS: 0
SUM OF ALL RESPONSES TO PHQ9 QUESTIONS 1 & 2: 0
2. FEELING DOWN, DEPRESSED OR HOPELESS: 0

## 2023-11-07 NOTE — PATIENT INSTRUCTIONS
Learning About Vision Tests  What are vision tests? The four most common vision tests are visual acuity tests, refraction, visual field tests, and color vision tests. Visual acuity (sharpness) tests  These tests are used: To see if you need glasses or contact lenses. To monitor an eye problem. To check an eye injury. Visual acuity tests are done as part of routine exams. You may also have this test when you get your 's license or apply for some types of jobs. Visual field tests  These tests are used: To check for vision loss in any area of your range of vision. To screen for certain eye diseases. To look for nerve damage after a stroke, head injury, or other problem that could reduce blood flow to the brain. Refraction and color tests  A refraction test is done to find the right prescription for glasses and contact lenses. A color vision test is done to check for color blindness. Color vision is often tested as part of a routine exam. You may also have this test when you apply for a job where recognizing different colors is important, such as , electronics, or the [employer]. How are vision tests done? Visual acuity test   You cover one eye at a time. You read aloud from a wall chart across the room. You read aloud from a small card that you hold in your hand. Refraction   You look into a special device. The device puts lenses of different strengths in front of each eye to see how strong your glasses or contact lenses need to be. Visual field tests   Your doctor may have you look through special machines. Or your doctor may simply have you stare straight ahead while they move a finger into and out of your field of vision. Color vision test   You look at pieces of printed test patterns in various colors. You say what number or symbol you see. Your doctor may have you trace the number or symbol using a pointer. How do these tests feel?   There is very little chance of

## 2023-11-07 NOTE — PROGRESS NOTES
daily Yes Earlene Parks MD   spironolactone (ALDACTONE) 25 MG tablet Take 1 tablet by mouth daily Yes Earlene Parks MD   amLODIPine (NORVASC) 10 MG tablet Take 20 mg by mouth daily  Provider, MD Reggie Edward (Including outside providers/suppliers regularly involved in providing care):   Patient Care Team:  Annabelle Draper DO as PCP - General (Family Medicine)  Annabelle Draper DO as PCP - Empaneled Provider     Reviewed and updated this visit:  Tobacco  Allergies  Meds  Problems  Med Hx  Surg Hx  Soc Hx  Fam Hx

## 2023-11-17 ENCOUNTER — HOSPITAL ENCOUNTER (OUTPATIENT)
Dept: OTHER | Age: 63
Setting detail: THERAPIES SERIES
Discharge: HOME | End: 2023-11-17
Payer: MEDICARE

## 2023-11-17 VITALS
OXYGEN SATURATION: 98 % | RESPIRATION RATE: 16 BRPM | SYSTOLIC BLOOD PRESSURE: 138 MMHG | DIASTOLIC BLOOD PRESSURE: 82 MMHG | BODY MASS INDEX: 40 KG/M2 | WEIGHT: 204.8 LBS | HEART RATE: 81 BPM

## 2023-11-17 LAB
ANION GAP SERPL CALCULATED.3IONS-SCNC: 7 MMOL/L (ref 7–16)
BNP SERPL-MCNC: <36 PG/ML (ref 0–450)
BUN SERPL-MCNC: 16 MG/DL (ref 6–23)
CALCIUM SERPL-MCNC: 8.9 MG/DL (ref 8.6–10.2)
CHLORIDE SERPL-SCNC: 103 MMOL/L (ref 98–107)
CO2 SERPL-SCNC: 24 MMOL/L (ref 22–29)
CREAT SERPL-MCNC: 0.9 MG/DL (ref 0.5–1)
GFR SERPL CREATININE-BSD FRML MDRD: >60 ML/MIN/1.73M2
GLUCOSE SERPL-MCNC: 130 MG/DL (ref 74–99)
POTASSIUM SERPL-SCNC: 3.9 MMOL/L (ref 3.5–5)
SODIUM SERPL-SCNC: 134 MMOL/L (ref 132–146)

## 2023-11-17 PROCEDURE — 99214 OFFICE O/P EST MOD 30 MIN: CPT

## 2023-11-17 PROCEDURE — 36415 COLL VENOUS BLD VENIPUNCTURE: CPT

## 2023-11-17 PROCEDURE — 83880 ASSAY OF NATRIURETIC PEPTIDE: CPT

## 2023-11-17 PROCEDURE — 80048 BASIC METABOLIC PNL TOTAL CA: CPT

## 2023-11-17 NOTE — PROGRESS NOTES
needed     [] Difficulty affording medications  [] CHF CHW consulted  [] Prescription assistance information given   [] [facility] medication assistance program information given   [] Sample medications provided to patient to help bridge gap until affordability N/A      Additional Notes:          Scheduled to follow up in CHF clinic on:    Future Appointments   Date Time Provider [address] [facility]   12/27/2023  1:30 PM [facility] DEXA [facility] MAMMO [facility]iol[facility]   1/29/2024  8:30 AM Clifford Jenkins MD [facility] CARDIO [facility]   2/8/2024  8:30 AM DO Bruce Merchant PC [facility]   3/15/2024 11:15 AM [facility] CHF ROOM 2 [facility]F [facility]   11/12/2024  8:30 AM Jefferson Jerome DO [facility]

## 2023-11-18 ASSESSMENT — ENCOUNTER SYMPTOMS
PHOTOPHOBIA: 0
SHORTNESS OF BREATH: 0
GASTROINTESTINAL NEGATIVE: 1
RHINORRHEA: 0
EYE REDNESS: 0
COUGH: 0
SINUS PAIN: 0
EYE DISCHARGE: 0

## 2023-11-21 RX ORDER — SIMVASTATIN 20 MG
20 TABLET ORAL NIGHTLY
Qty: 90 TABLET | Refills: 1 | Status: SHIPPED | OUTPATIENT
Start: 2023-11-21

## 2023-11-24 NOTE — TELEPHONE ENCOUNTER
Patient called for refill.     Last seen 11/7/2023  Next appt 2/8/2024  [facility] [address]

## 2023-11-25 RX ORDER — METOPROLOL SUCCINATE 25 MG/1
25 TABLET, EXTENDED RELEASE ORAL DAILY
Qty: 90 TABLET | Refills: 3 | Status: SHIPPED | OUTPATIENT
Start: 2023-11-25

## 2023-11-29 DIAGNOSIS — I25.10 CAD IN NATIVE ARTERY: Primary | ICD-10-CM

## 2023-11-30 RX ORDER — SPIRONOLACTONE 25 MG/1
25 TABLET ORAL DAILY
Qty: 90 TABLET | Refills: 1 | Status: SHIPPED | OUTPATIENT
Start: 2023-11-30

## 2023-12-11 RX ORDER — METOPROLOL SUCCINATE 25 MG/1
25 TABLET, EXTENDED RELEASE ORAL DAILY
Qty: 90 TABLET | Refills: 3 | Status: SHIPPED | OUTPATIENT
Start: 2023-12-11

## 2023-12-27 ENCOUNTER — HOSPITAL ENCOUNTER (OUTPATIENT)
Dept: MAMMOGRAPHY | Age: 63
Discharge: HOME | End: 2023-12-29
Payer: MEDICARE

## 2023-12-27 DIAGNOSIS — Z78.0 POST-MENOPAUSAL: ICD-10-CM

## 2023-12-27 PROCEDURE — 77080 DXA BONE DENSITY AXIAL: CPT

## 2024-01-03 RX ORDER — FUROSEMIDE 20 MG/1
20 TABLET ORAL DAILY PRN
Qty: 30 TABLET | Refills: 0 | Status: SHIPPED | OUTPATIENT
Start: 2024-01-03

## 2024-01-29 ENCOUNTER — OFFICE VISIT (OUTPATIENT)
Dept: CARDIOLOGY CLINIC | Age: 64
End: 2024-01-29
Payer: MEDICARE

## 2024-01-29 VITALS
HEIGHT: 60 IN | RESPIRATION RATE: 20 BRPM | BODY MASS INDEX: 40.4 KG/M2 | WEIGHT: 205.8 LBS | DIASTOLIC BLOOD PRESSURE: 80 MMHG | SYSTOLIC BLOOD PRESSURE: 128 MMHG | HEART RATE: 79 BPM

## 2024-01-29 DIAGNOSIS — I50.20 HFREF (HEART FAILURE WITH REDUCED EJECTION FRACTION) (HCC): ICD-10-CM

## 2024-01-29 DIAGNOSIS — E78.2 MIXED HYPERLIPIDEMIA: ICD-10-CM

## 2024-01-29 DIAGNOSIS — I21.4 NSTEMI (NON-ST ELEVATED MYOCARDIAL INFARCTION) (HCC): ICD-10-CM

## 2024-01-29 DIAGNOSIS — I51.3 LV (LEFT VENTRICULAR) MURAL THROMBUS: ICD-10-CM

## 2024-01-29 DIAGNOSIS — E66.09 CLASS 2 OBESITY DUE TO EXCESS CALORIES WITHOUT SERIOUS COMORBIDITY WITH BODY MASS INDEX (BMI) OF 35.0 TO 35.9 IN ADULT: ICD-10-CM

## 2024-01-29 DIAGNOSIS — I25.10 CAD IN NATIVE ARTERY: ICD-10-CM

## 2024-01-29 DIAGNOSIS — I42.8 NICM (NONISCHEMIC CARDIOMYOPATHY) (HCC): Primary | ICD-10-CM

## 2024-01-29 PROCEDURE — 3079F DIAST BP 80-89 MM HG: CPT | Performed by: INTERNAL MEDICINE

## 2024-01-29 PROCEDURE — 93000 ELECTROCARDIOGRAM COMPLETE: CPT | Performed by: INTERNAL MEDICINE

## 2024-01-29 PROCEDURE — 99214 OFFICE O/P EST MOD 30 MIN: CPT | Performed by: INTERNAL MEDICINE

## 2024-01-29 PROCEDURE — 1124F ACP DISCUSS-NO DSCNMKR DOCD: CPT | Performed by: INTERNAL MEDICINE

## 2024-01-29 PROCEDURE — 3074F SYST BP LT 130 MM HG: CPT | Performed by: INTERNAL MEDICINE

## 2024-01-29 NOTE — PATIENT INSTRUCTIONS
Continue all your medications at current doses.   Restrict sodium intake to less than 2-2.5 g/day. Restrict fluid intake to less than 2.2 L/day. Goal BP is less than 130/80.   If your weight increases by > 2 lbs in a day or >5 lbs in more than a day, take an extra lasix pill.   Please try to exercise for 150 minutes a week.   I will see you back in the office in 6 months. Please call the office at ([phone], option [phone]) if you have any questions.

## 2024-01-29 NOTE — PROGRESS NOTES
estimated LVEF is 10%.   Grade II diastolic dysfunction. Elevated LA pressure.   Normal right ventricle structure and moderate function.   The left atrium is moderately dilated.   Normal right atrial size   Mild-moderate mitral regurgitation is present.   Moderate tricuspid regurgitation.   Moderate-severe pulmonary hypertension with a PASP of 64 mm Hg.   Elevated RA pressure.   There is a fixed echodensity measuring 2 x 1.8 cms at the LV apex. This is   consistent with an LV thrombus. There is another fixed, smaller   echodensity, also at the apex consistent with a thrombus.   No comparison study available.    [facility] (8/29/2022)  ANGIOGRAPHIC FINDINGS:    The left main coronary artery arises normally from the left sinus of Valsalva.  It is relatively short vessel, mid-sized vessel without any disease.  It trifurcates into left anterior descending artery and left circumflex artery.  The left anterior descending artery extends down to the apex and mildly tortuous.  It gives off good size diagonal branch that has no CAD.  The ramus intermedius artery is a mid size vessel, tortuous without any disease.  The left circumflex artery is large vessel, gives off two small OM branches and very large left posterolateral branch.  The left circumflex artery and its branches have no CAD.  The right coronary artery arises normally from the right sinus of Valsalva.  It is tortuous vessel, dominant circulation with 30% disease in the mid segment.  IMPRESSION:  1.  Mild disease involving mid RCA.  Otherwise, angiographically normal coronary arteries.  2.  Nonischemic cardiomyopathy.    EKG  Sinus Rhythm   LAE  LVH  Diffuse nonspecific T-abnormality.   CMR 12/22:  There is mild concentric left ventricular hypertrophy.     Visually, the left ventricle appears normal in size with low normal  systolic function. The estimated left ventricular ejection fraction is  50%.     Visually, the right ventricle is normal in size and systolic

## 2024-02-08 ENCOUNTER — OFFICE VISIT (OUTPATIENT)
Dept: FAMILY MEDICINE CLINIC | Age: 64
End: 2024-02-08
Payer: MEDICARE

## 2024-02-08 VITALS
BODY MASS INDEX: 39.46 KG/M2 | HEIGHT: 60 IN | WEIGHT: 201 LBS | SYSTOLIC BLOOD PRESSURE: 124 MMHG | TEMPERATURE: 97 F | DIASTOLIC BLOOD PRESSURE: 74 MMHG | HEART RATE: 68 BPM

## 2024-02-08 DIAGNOSIS — E03.9 HYPOTHYROIDISM, UNSPECIFIED TYPE: ICD-10-CM

## 2024-02-08 DIAGNOSIS — I25.10 CAD IN NATIVE ARTERY: ICD-10-CM

## 2024-02-08 DIAGNOSIS — E66.09 CLASS 2 OBESITY DUE TO EXCESS CALORIES WITHOUT SERIOUS COMORBIDITY WITH BODY MASS INDEX (BMI) OF 35.0 TO 35.9 IN ADULT: ICD-10-CM

## 2024-02-08 DIAGNOSIS — I10 PRIMARY HYPERTENSION: ICD-10-CM

## 2024-02-08 DIAGNOSIS — Z23 IMMUNIZATION, PNEUMOCOCCUS AND INFLUENZA: ICD-10-CM

## 2024-02-08 DIAGNOSIS — C18.9 MALIGNANT NEOPLASM OF COLON, UNSPECIFIED PART OF COLON (HCC): Primary | ICD-10-CM

## 2024-02-08 PROCEDURE — 3074F SYST BP LT 130 MM HG: CPT | Performed by: FAMILY MEDICINE

## 2024-02-08 PROCEDURE — 90677 PCV20 VACCINE IM: CPT | Performed by: FAMILY MEDICINE

## 2024-02-08 PROCEDURE — G0009 ADMIN PNEUMOCOCCAL VACCINE: HCPCS | Performed by: FAMILY MEDICINE

## 2024-02-08 PROCEDURE — 3078F DIAST BP <80 MM HG: CPT | Performed by: FAMILY MEDICINE

## 2024-02-08 PROCEDURE — 99213 OFFICE O/P EST LOW 20 MIN: CPT | Performed by: FAMILY MEDICINE

## 2024-02-08 PROCEDURE — 1124F ACP DISCUSS-NO DSCNMKR DOCD: CPT | Performed by: FAMILY MEDICINE

## 2024-02-08 SDOH — ECONOMIC STABILITY: FOOD INSECURITY: WITHIN THE PAST 12 MONTHS, YOU WORRIED THAT YOUR FOOD WOULD RUN OUT BEFORE YOU GOT MONEY TO BUY MORE.: NEVER TRUE

## 2024-02-08 SDOH — ECONOMIC STABILITY: INCOME INSECURITY: HOW HARD IS IT FOR YOU TO PAY FOR THE VERY BASICS LIKE FOOD, HOUSING, MEDICAL CARE, AND HEATING?: NOT HARD AT ALL

## 2024-02-08 SDOH — ECONOMIC STABILITY: FOOD INSECURITY: WITHIN THE PAST 12 MONTHS, THE FOOD YOU BOUGHT JUST DIDN'T LAST AND YOU DIDN'T HAVE MONEY TO GET MORE.: NEVER TRUE

## 2024-02-08 ASSESSMENT — PATIENT HEALTH QUESTIONNAIRE - PHQ9
SUM OF ALL RESPONSES TO PHQ QUESTIONS 1-9: 0
2. FEELING DOWN, DEPRESSED OR HOPELESS: 0
SUM OF ALL RESPONSES TO PHQ QUESTIONS 1-9: 0
1. LITTLE INTEREST OR PLEASURE IN DOING THINGS: 0
SUM OF ALL RESPONSES TO PHQ9 QUESTIONS 1 & 2: 0
SUM OF ALL RESPONSES TO PHQ QUESTIONS 1-9: 0
SUM OF ALL RESPONSES TO PHQ QUESTIONS 1-9: 0

## 2024-02-08 NOTE — PROGRESS NOTES
1.32 in March of last year.  Patient will continue with levothyroxine 50 mcg daily.    Class 2 obesity due to excess calories without serious comorbidity with body mass index (BMI) of 35.0 to 35.9 in adult  Will address patient's obesity at follow-up appointments after she has recovered from her colon surgery.    Immunization, pneumococcus and influenza  -     Pneumococcal, PCV20, PREVNAR 20, (age 6w+), IM, PF        Return in about 3 months (around 5/8/2024).         Electronically signed by Jonas Luz DO on 3/3/2024

## 2024-03-03 PROBLEM — C18.9 MALIGNANT NEOPLASM OF COLON (HCC): Status: ACTIVE | Noted: 2024-03-03

## 2024-03-15 ENCOUNTER — HOSPITAL ENCOUNTER (OUTPATIENT)
Dept: OTHER | Age: 64
Setting detail: THERAPIES SERIES
Discharge: HOME | End: 2024-03-15
Payer: MEDICARE

## 2024-03-15 ENCOUNTER — HOSPITAL ENCOUNTER (OUTPATIENT)
Dept: OTHER | Age: 64
Discharge: HOME | End: 2024-03-15

## 2024-03-15 VITALS
RESPIRATION RATE: 16 BRPM | DIASTOLIC BLOOD PRESSURE: 72 MMHG | WEIGHT: 197.2 LBS | SYSTOLIC BLOOD PRESSURE: 122 MMHG | BODY MASS INDEX: 38.51 KG/M2 | OXYGEN SATURATION: 99 % | HEART RATE: 70 BPM

## 2024-03-15 LAB
ANION GAP SERPL CALCULATED.3IONS-SCNC: 9 MMOL/L (ref 7–16)
BNP SERPL-MCNC: <36 PG/ML (ref 0–450)
BUN SERPL-MCNC: 13 MG/DL (ref 6–23)
CALCIUM SERPL-MCNC: 9.3 MG/DL (ref 8.6–10.2)
CHLORIDE SERPL-SCNC: 104 MMOL/L (ref 98–107)
CO2 SERPL-SCNC: 24 MMOL/L (ref 22–29)
CREAT SERPL-MCNC: 1 MG/DL (ref 0.5–1)
GFR SERPL CREATININE-BSD FRML MDRD: >60 ML/MIN/1.73M2
GLUCOSE SERPL-MCNC: 123 MG/DL (ref 74–99)
POTASSIUM SERPL-SCNC: 4.1 MMOL/L (ref 3.5–5)
SODIUM SERPL-SCNC: 137 MMOL/L (ref 132–146)

## 2024-03-15 PROCEDURE — 99214 OFFICE O/P EST MOD 30 MIN: CPT

## 2024-03-15 PROCEDURE — 83880 ASSAY OF NATRIURETIC PEPTIDE: CPT

## 2024-03-15 PROCEDURE — 36415 COLL VENOUS BLD VENIPUNCTURE: CPT

## 2024-03-15 PROCEDURE — 80048 BASIC METABOLIC PNL TOTAL CA: CPT

## 2024-03-15 ASSESSMENT — PATIENT HEALTH QUESTIONNAIRE - PHQ9
SUM OF ALL RESPONSES TO PHQ QUESTIONS 1-9: 0
2. FEELING DOWN, DEPRESSED OR HOPELESS: 0
1. LITTLE INTEREST OR PLEASURE IN DOING THINGS: 0
SUM OF ALL RESPONSES TO PHQ9 QUESTIONS 1 & 2: 0

## 2024-03-15 NOTE — PROGRESS NOTES
CHF clinic phone number provided and made aware to call clinic for sooner if evaluation if needed     [] Difficulty affording medications  [] CHF CHW consulted  [] Prescription assistance information given   [] [facility] medication assistance program information given   [] Sample medications provided to patient to help bridge gap until affordability N/A      Additional Notes:  Patient here for 4 month follow up. Feeling good. Tolerating GDMT. Denies any symptoms- has not taken any prn diuretics for 6 months. She is working on wt loss and is following a low sodium diet. Drinking about 54 oz daily.   Recent follow up with PCP and Dr. Bradford- no changes.   Follow up in 4 months      Scheduled to follow up in CHF clinic on:   Future Appointments   Date Time Provider Department Center   5/9/2024  7:30 AM Jonas Luz DO [facility]   7/15/2024  9:00 AM [facility] CHF ROOM 1 [facility]   11/12/2024  8:30 AM Jonas Luz DO [facility]

## 2024-03-28 ENCOUNTER — HOSPITAL ENCOUNTER (OUTPATIENT)
Dept: INFUSION THERAPY | Age: 64
Discharge: HOME | End: 2024-03-28
Payer: MEDICARE

## 2024-03-28 ENCOUNTER — OFFICE VISIT (OUTPATIENT)
Dept: ONCOLOGY | Age: 64
End: 2024-03-28
Payer: MEDICARE

## 2024-03-28 VITALS
RESPIRATION RATE: 16 BRPM | TEMPERATURE: 97 F | WEIGHT: 197.3 LBS | SYSTOLIC BLOOD PRESSURE: 148 MMHG | HEART RATE: 76 BPM | OXYGEN SATURATION: 100 % | DIASTOLIC BLOOD PRESSURE: 78 MMHG | BODY MASS INDEX: 38.53 KG/M2

## 2024-03-28 DIAGNOSIS — D50.9 IRON DEFICIENCY ANEMIA, UNSPECIFIED IRON DEFICIENCY ANEMIA TYPE: Primary | ICD-10-CM

## 2024-03-28 DIAGNOSIS — C18.2 MALIGNANT NEOPLASM OF ASCENDING COLON (HCC): ICD-10-CM

## 2024-03-28 DIAGNOSIS — D47.2 MONOCLONAL GAMMOPATHIES: Primary | ICD-10-CM

## 2024-03-28 LAB
ALBUMIN SERPL-MCNC: 3.8 G/DL (ref 3.5–5.2)
ALP SERPL-CCNC: 113 U/L (ref 35–104)
ALT SERPL-CCNC: 11 U/L (ref 0–32)
ANION GAP SERPL CALCULATED.3IONS-SCNC: 10 MMOL/L (ref 7–16)
AST SERPL-CCNC: 13 U/L (ref 0–31)
BASOPHILS # BLD: 0.04 K/UL (ref 0–0.2)
BASOPHILS NFR BLD: 1 % (ref 0–2)
BILIRUB SERPL-MCNC: 0.4 MG/DL (ref 0–1.2)
BUN SERPL-MCNC: 14 MG/DL (ref 6–23)
CALCIUM SERPL-MCNC: 8.8 MG/DL (ref 8.6–10.2)
CHLORIDE SERPL-SCNC: 102 MMOL/L (ref 98–107)
CO2 SERPL-SCNC: 23 MMOL/L (ref 22–29)
CREAT SERPL-MCNC: 0.9 MG/DL (ref 0.5–1)
EOSINOPHIL # BLD: 0.27 K/UL (ref 0.05–0.5)
EOSINOPHILS RELATIVE PERCENT: 4 % (ref 0–6)
ERYTHROCYTE [DISTWIDTH] IN BLOOD BY AUTOMATED COUNT: 15.5 % (ref 11.5–15)
GFR SERPL CREATININE-BSD FRML MDRD: 75 ML/MIN/1.73M2
GLUCOSE SERPL-MCNC: 108 MG/DL (ref 74–99)
HCT VFR BLD AUTO: 39.9 % (ref 34–48)
HGB BLD-MCNC: 13.1 G/DL (ref 11.5–15.5)
IMM GRANULOCYTES # BLD AUTO: <0.03 K/UL (ref 0–0.58)
IMM GRANULOCYTES NFR BLD: 0 % (ref 0–5)
LYMPHOCYTES NFR BLD: 2.6 K/UL (ref 1.5–4)
LYMPHOCYTES RELATIVE PERCENT: 42 % (ref 20–42)
MCH RBC QN AUTO: 28.4 PG (ref 26–35)
MCHC RBC AUTO-ENTMCNC: 32.8 G/DL (ref 32–34.5)
MCV RBC AUTO: 86.4 FL (ref 80–99.9)
MONOCYTES NFR BLD: 0.61 K/UL (ref 0.1–0.95)
MONOCYTES NFR BLD: 10 % (ref 2–12)
NEUTROPHILS NFR BLD: 44 % (ref 43–80)
NEUTS SEG NFR BLD: 2.74 K/UL (ref 1.8–7.3)
PLATELET # BLD AUTO: 301 K/UL (ref 130–450)
PMV BLD AUTO: 11.4 FL (ref 7–12)
POTASSIUM SERPL-SCNC: 4.3 MMOL/L (ref 3.5–5)
PROT SERPL-MCNC: 9.4 G/DL (ref 6.4–8.3)
RBC # BLD AUTO: 4.62 M/UL (ref 3.5–5.5)
SODIUM SERPL-SCNC: 135 MMOL/L (ref 132–146)
WBC OTHER # BLD: 6.3 K/UL (ref 4.5–11.5)

## 2024-03-28 PROCEDURE — 36415 COLL VENOUS BLD VENIPUNCTURE: CPT

## 2024-03-28 PROCEDURE — 86334 IMMUNOFIX E-PHORESIS SERUM: CPT

## 2024-03-28 PROCEDURE — 80053 COMPREHEN METABOLIC PANEL: CPT

## 2024-03-28 PROCEDURE — 83521 IG LIGHT CHAINS FREE EACH: CPT

## 2024-03-28 PROCEDURE — 99212 OFFICE O/P EST SF 10 MIN: CPT

## 2024-03-28 PROCEDURE — 84155 ASSAY OF PROTEIN SERUM: CPT

## 2024-03-28 PROCEDURE — 85025 COMPLETE CBC W/AUTO DIFF WBC: CPT

## 2024-03-28 PROCEDURE — 84165 PROTEIN E-PHORESIS SERUM: CPT

## 2024-03-28 NOTE — PROGRESS NOTES
Patient provided with discharge instructions.  All questions answered.  Patient understands follow up plan of care.     
not be done due to limited specimen.  Stage II pT3 pN0 M0 ascending colon CA s/p right hemicolectomy in February 2024.  Positive perineural and lymphovascular invasion.      Colon cancer: Reviewed pathology and treatment options.  Advised capecitabine 1000 to 1250 mg/m² twice daily day 1-14 q. 21 days for 8 cycles.  Skin benefits discussed.  Patient understood and agreed for treatment.  Would not benefit from addition of oxaliplatin.  Patient was also evaluated by Dr. Burger at [facility].  Bilateral lung nodules less than 4 mm.  Will continue to monitor.      Smoldering multiple myeloma: Will continue to monitor myeloma labs every 3 months.  Follow labs from today.  Has not followed up for past year.  Hemoglobin creatinine calcium normal.  Skeletal survey with no lytic lesions 2023.    Patient's other medical comorbidities include coronary artery disease, congestive heart failure, history of atrial thrombus, on GDMT Eliquis. Cardiac MRI not suggestive of cardiac amyloidosis.  EF improved.  Follows with Dr. Bradford.          Return in about 1 week (around 4/4/2024).     Fabian Welch MD   Electronically signed 3/28/2024 at 10:47 AM

## 2024-03-29 LAB
ALBUMIN SERPL-MCNC: 3.6 G/DL (ref 3.5–4.7)
ALPHA1 GLOB SERPL ELPH-MCNC: 0.3 G/DL (ref 0.2–0.4)
ALPHA2 GLOB SERPL ELPH-MCNC: 0.8 G/DL (ref 0.5–1)
B-GLOBULIN SERPL ELPH-MCNC: 1.4 G/DL (ref 0.8–1.3)
GAMMA GLOB SERPL ELPH-MCNC: 2.8 G/DL (ref 0.7–1.6)
INTERPRETATION SERPL IFE-IMP: NORMAL
M PROTEIN SERPL ELPH-MCNC: 2.4 G/DL
PATH REV: NORMAL
PATHOLOGIST: ABNORMAL
PROT PATTERN SERPL ELPH-IMP: ABNORMAL
PROT SERPL-MCNC: 8.9 G/DL (ref 6.4–8.3)

## 2024-03-30 LAB
FREE KAPPA/LAMBDA RATIO: 3.48 (ref 0.22–1.74)
KAPPA LC FREE SER-MCNC: 57.8 MG/L
LAMBDA LC FREE SERPL-MCNC: 16.6 MG/L (ref 4.2–27.7)

## 2024-04-04 ENCOUNTER — OFFICE VISIT (OUTPATIENT)
Dept: ONCOLOGY | Age: 64
End: 2024-04-04
Payer: MEDICARE

## 2024-04-04 VITALS
OXYGEN SATURATION: 97 % | WEIGHT: 197.2 LBS | HEART RATE: 71 BPM | BODY MASS INDEX: 38.51 KG/M2 | RESPIRATION RATE: 16 BRPM | TEMPERATURE: 98.1 F | SYSTOLIC BLOOD PRESSURE: 162 MMHG | DIASTOLIC BLOOD PRESSURE: 94 MMHG

## 2024-04-04 DIAGNOSIS — C18.2 MALIGNANT NEOPLASM OF ASCENDING COLON (HCC): Primary | ICD-10-CM

## 2024-04-04 PROCEDURE — 99212 OFFICE O/P EST SF 10 MIN: CPT

## 2024-04-04 RX ORDER — CAPECITABINE 500 MG/1
2000 TABLET, FILM COATED ORAL 2 TIMES DAILY
Qty: 112 TABLET | Refills: 3 | Status: ACTIVE | OUTPATIENT
Start: 2024-04-04 | End: 2024-05-30

## 2024-04-04 NOTE — PROGRESS NOTES
non-tender;no masses, no organomegaly  Extremities: No edema,no cyanosis, no clubbing.   Skin:  No rash.  Neurologic:Cranial nerves grossly intact. No focal motor or sensory deficits .    Recent Laboratory Data-   Lab Results   Component Value Date    WBC 6.3 03/28/2024    HGB 13.1 03/28/2024    HCT 39.9 03/28/2024    MCV 86.4 03/28/2024     03/28/2024    LYMPHOPCT 42 03/28/2024    RBC 4.62 03/28/2024    MCH 28.4 03/28/2024    MCHC 32.8 03/28/2024    RDW 15.5 (H) 03/28/2024    NEUTOPHILPCT 44 03/28/2024    MONOPCT 10 03/28/2024    BASOPCT 1 03/28/2024    NEUTROABS 2.74 03/28/2024    LYMPHSABS 2.60 03/28/2024    MONOSABS 0.61 03/28/2024    EOSABS 0.27 03/28/2024    BASOSABS 0.04 03/28/2024       Lab Results   Component Value Date     03/28/2024    K 4.3 03/28/2024     03/28/2024    CO2 23 03/28/2024    BUN 14 03/28/2024    CREATININE 0.9 03/28/2024    GLUCOSE 108 (H) 03/28/2024    CALCIUM 8.8 03/28/2024    PROT 9.4 (H) 03/28/2024    PROT 8.9 (H) 03/28/2024    LABALBU 3.8 03/28/2024    BILITOT 0.4 03/28/2024    ALKPHOS 113 (H) 03/28/2024    AST 13 03/28/2024    ALT 11 03/28/2024    LABGLOM 75 03/28/2024    GFRAA 55 10/14/2022       Lab Results   Component Value Date    IRON 46 01/19/2023    TIBC 420 01/19/2023    FERRITIN 18 01/19/2023           Radiology-    No results found.      ASSESSMENT/PLAN :    Vivi was seen today for follow-up.    Diagnoses and all orders for this visit:    Malignant neoplasm of ascending colon (HCC)  -     CBC with Auto Differential; Future  -     Comprehensive Metabolic Panel; Future  -     CEA; Future    Other orders  -     capecitabine (XELODA) 500 MG chemo tablet; Take 4 tablets (2,000 mg total) by mouth 2 times daily 4 pills oral twice a day for 14 days then 1 week off. Repeat every 21 days.      62 years old female smoldering multiple myeloma diagnosed in February 2023 with 30% kappa restricted plasma cells, normal karyotype, FISH showing 13 monosomy and IgH

## 2024-04-05 ENCOUNTER — TELEPHONE (OUTPATIENT)
Dept: INFUSION THERAPY | Age: 64
End: 2024-04-05

## 2024-04-05 NOTE — TELEPHONE ENCOUNTER
Per Eufemia, from [facility] pharmacy, patient's Xeloda does not require a prior authorization and her co-pay is $43.69 per her insurance. Patient was updated and med has shipped to arrive this Wednesday. Voiced understanding.

## 2024-04-09 ENCOUNTER — TELEPHONE (OUTPATIENT)
Dept: INFUSION THERAPY | Age: 64
End: 2024-04-09

## 2024-04-09 NOTE — TELEPHONE ENCOUNTER
Per Eufemia, from [facility] pharmacy, patient's Xeloda did not ship due to during patient education, patient stated her dose was 1000 mg twice a day and order clarification was requested. Per Dr. Welch, \"patient is to take Xeloda 2000 mg BID for 14 days on, then 7 days off, as per previous doctor orders.\" Voiced understanding and notified [facility].

## 2024-04-25 ENCOUNTER — HOSPITAL ENCOUNTER (OUTPATIENT)
Dept: INFUSION THERAPY | Age: 64
Discharge: HOME | End: 2024-04-25
Payer: MEDICARE

## 2024-04-25 ENCOUNTER — OFFICE VISIT (OUTPATIENT)
Dept: ONCOLOGY | Age: 64
End: 2024-04-25
Payer: MEDICARE

## 2024-04-25 VITALS
DIASTOLIC BLOOD PRESSURE: 79 MMHG | OXYGEN SATURATION: 98 % | RESPIRATION RATE: 16 BRPM | SYSTOLIC BLOOD PRESSURE: 153 MMHG | HEART RATE: 72 BPM | TEMPERATURE: 96.6 F | WEIGHT: 194.2 LBS | BODY MASS INDEX: 37.93 KG/M2

## 2024-04-25 DIAGNOSIS — C18.2 MALIGNANT NEOPLASM OF ASCENDING COLON (HCC): ICD-10-CM

## 2024-04-25 DIAGNOSIS — C18.2 MALIGNANT NEOPLASM OF ASCENDING COLON (HCC): Primary | ICD-10-CM

## 2024-04-25 LAB
ALBUMIN SERPL-MCNC: 3.8 G/DL (ref 3.5–5.2)
ALP SERPL-CCNC: 111 U/L (ref 35–104)
ALT SERPL-CCNC: 12 U/L (ref 0–32)
ANION GAP SERPL CALCULATED.3IONS-SCNC: 12 MMOL/L (ref 7–16)
AST SERPL-CCNC: 16 U/L (ref 0–31)
BASOPHILS # BLD: 0.03 K/UL (ref 0–0.2)
BASOPHILS NFR BLD: 0 % (ref 0–2)
BILIRUB SERPL-MCNC: 0.4 MG/DL (ref 0–1.2)
BUN SERPL-MCNC: 19 MG/DL (ref 6–23)
CALCIUM SERPL-MCNC: 9.1 MG/DL (ref 8.6–10.2)
CEA SERPL-MCNC: 3.7 NG/ML (ref 0–5.2)
CHLORIDE SERPL-SCNC: 102 MMOL/L (ref 98–107)
CO2 SERPL-SCNC: 20 MMOL/L (ref 22–29)
CREAT SERPL-MCNC: 0.9 MG/DL (ref 0.5–1)
EOSINOPHIL # BLD: 0.13 K/UL (ref 0.05–0.5)
EOSINOPHILS RELATIVE PERCENT: 2 % (ref 0–6)
ERYTHROCYTE [DISTWIDTH] IN BLOOD BY AUTOMATED COUNT: 17.8 % (ref 11.5–15)
GFR SERPL CREATININE-BSD FRML MDRD: 71 ML/MIN/1.73M2
GLUCOSE SERPL-MCNC: 109 MG/DL (ref 74–99)
HCT VFR BLD AUTO: 40.7 % (ref 34–48)
HGB BLD-MCNC: 13.6 G/DL (ref 11.5–15.5)
IMM GRANULOCYTES # BLD AUTO: <0.03 K/UL (ref 0–0.58)
IMM GRANULOCYTES NFR BLD: 0 % (ref 0–5)
LYMPHOCYTES NFR BLD: 2.57 K/UL (ref 1.5–4)
LYMPHOCYTES RELATIVE PERCENT: 38 % (ref 20–42)
MCH RBC QN AUTO: 29.1 PG (ref 26–35)
MCHC RBC AUTO-ENTMCNC: 33.4 G/DL (ref 32–34.5)
MCV RBC AUTO: 87 FL (ref 80–99.9)
MONOCYTES NFR BLD: 0.71 K/UL (ref 0.1–0.95)
MONOCYTES NFR BLD: 11 % (ref 2–12)
NEUTROPHILS NFR BLD: 49 % (ref 43–80)
NEUTS SEG NFR BLD: 3.29 K/UL (ref 1.8–7.3)
PLATELET # BLD AUTO: 346 K/UL (ref 130–450)
PMV BLD AUTO: 10.9 FL (ref 7–12)
POTASSIUM SERPL-SCNC: 4.3 MMOL/L (ref 3.5–5)
PROT SERPL-MCNC: 9.7 G/DL (ref 6.4–8.3)
RBC # BLD AUTO: 4.68 M/UL (ref 3.5–5.5)
SODIUM SERPL-SCNC: 134 MMOL/L (ref 132–146)
WBC OTHER # BLD: 6.7 K/UL (ref 4.5–11.5)

## 2024-04-25 PROCEDURE — 99212 OFFICE O/P EST SF 10 MIN: CPT

## 2024-04-25 PROCEDURE — 36415 COLL VENOUS BLD VENIPUNCTURE: CPT

## 2024-04-25 PROCEDURE — 85025 COMPLETE CBC W/AUTO DIFF WBC: CPT

## 2024-04-25 PROCEDURE — 82378 CARCINOEMBRYONIC ANTIGEN: CPT

## 2024-04-25 PROCEDURE — 80053 COMPREHEN METABOLIC PANEL: CPT

## 2024-04-25 NOTE — PROGRESS NOTES
and lymphovascular invasion.      Colon cancer : stage 2 colon ca with high risk features PNI, LVI.  Started capecitabine 1000 mg/m² (2000 mg) twice daily day 1-14 q. 21 days in April 2024.  Cycle 1 day 20 today.   Tolerated it well.  CBC CMP unremarkable.  No new complaints.  Will continue for total of 8 cycles.   Would not benefit from addition of oxaliplatin.  Bilateral lung nodules less than 4 mm.  Will continue to monitor.      Smoldering multiple myeloma: Labs from 4/2024 show stable m spike of 2.4, serum free kappa 57, lambda 16, ratio 3.48. Hb, cr ca normal, no bone lesions on recent ct scans at [facility] in 1/2024. Will continue to monitor myeloma labs every 3 months.      Patient's other medical comorbidities include coronary artery disease, congestive heart failure, history of atrial thrombus, on GDMT Eliquis. Cardiac MRI not suggestive of cardiac amyloidosis.  EF improved.  Follows with Dr. Bradford.          Return in about 3 weeks (around 5/16/2024).     Fabian Welch MD   Electronically signed 4/25/2024 at 11:18 AM

## 2024-05-06 DIAGNOSIS — E03.9 HYPOTHYROIDISM, UNSPECIFIED TYPE: ICD-10-CM

## 2024-05-07 RX ORDER — SIMVASTATIN 20 MG
20 TABLET ORAL NIGHTLY
Qty: 90 TABLET | Refills: 1 | Status: SHIPPED
Start: 2024-05-07 | End: 2024-05-09

## 2024-05-07 RX ORDER — LEVOTHYROXINE SODIUM 0.05 MG/1
50 TABLET ORAL DAILY
Qty: 90 TABLET | Refills: 1 | Status: SHIPPED | OUTPATIENT
Start: 2024-05-07

## 2024-05-09 ENCOUNTER — OFFICE VISIT (OUTPATIENT)
Dept: FAMILY MEDICINE CLINIC | Age: 64
End: 2024-05-09
Payer: MEDICARE

## 2024-05-09 VITALS
HEIGHT: 60 IN | WEIGHT: 198 LBS | SYSTOLIC BLOOD PRESSURE: 108 MMHG | HEART RATE: 75 BPM | TEMPERATURE: 97.8 F | RESPIRATION RATE: 17 BRPM | DIASTOLIC BLOOD PRESSURE: 68 MMHG | OXYGEN SATURATION: 98 % | BODY MASS INDEX: 38.87 KG/M2

## 2024-05-09 DIAGNOSIS — I25.10 CAD IN NATIVE ARTERY: ICD-10-CM

## 2024-05-09 DIAGNOSIS — I10 PRIMARY HYPERTENSION: ICD-10-CM

## 2024-05-09 DIAGNOSIS — E66.09 CLASS 2 OBESITY DUE TO EXCESS CALORIES WITHOUT SERIOUS COMORBIDITY WITH BODY MASS INDEX (BMI) OF 35.0 TO 35.9 IN ADULT: ICD-10-CM

## 2024-05-09 DIAGNOSIS — E78.5 HYPERLIPIDEMIA, UNSPECIFIED HYPERLIPIDEMIA TYPE: ICD-10-CM

## 2024-05-09 DIAGNOSIS — E03.9 HYPOTHYROIDISM, UNSPECIFIED TYPE: ICD-10-CM

## 2024-05-09 DIAGNOSIS — C18.9 MALIGNANT NEOPLASM OF COLON, UNSPECIFIED PART OF COLON (HCC): ICD-10-CM

## 2024-05-09 DIAGNOSIS — R73.9 HYPERGLYCEMIA: Primary | ICD-10-CM

## 2024-05-09 LAB — HBA1C MFR BLD: 6.5 %

## 2024-05-09 PROCEDURE — 3078F DIAST BP <80 MM HG: CPT | Performed by: FAMILY MEDICINE

## 2024-05-09 PROCEDURE — 3074F SYST BP LT 130 MM HG: CPT | Performed by: FAMILY MEDICINE

## 2024-05-09 PROCEDURE — G2211 COMPLEX E/M VISIT ADD ON: HCPCS | Performed by: FAMILY MEDICINE

## 2024-05-09 PROCEDURE — 1124F ACP DISCUSS-NO DSCNMKR DOCD: CPT | Performed by: FAMILY MEDICINE

## 2024-05-09 PROCEDURE — 83036 HEMOGLOBIN GLYCOSYLATED A1C: CPT | Performed by: FAMILY MEDICINE

## 2024-05-09 PROCEDURE — 99214 OFFICE O/P EST MOD 30 MIN: CPT | Performed by: FAMILY MEDICINE

## 2024-05-09 RX ORDER — SPIRONOLACTONE 25 MG/1
25 TABLET ORAL DAILY
Qty: 90 TABLET | Refills: 1 | Status: SHIPPED | OUTPATIENT
Start: 2024-05-09

## 2024-05-09 RX ORDER — EMPAGLIFLOZIN 10 MG/1
10 TABLET, FILM COATED ORAL DAILY
Qty: 28 TABLET | Refills: 0 | OUTPATIENT
Start: 2024-05-09

## 2024-05-09 RX ORDER — ROSUVASTATIN CALCIUM 20 MG/1
20 TABLET, COATED ORAL DAILY
Qty: 90 TABLET | Refills: 1 | Status: SHIPPED | OUTPATIENT
Start: 2024-05-09

## 2024-05-09 NOTE — PROGRESS NOTES
2024    Vivi Heaton    Chief Complaint   Patient presents with    Other     F/u malignant neoplasm of colon       HPI  History was obtained from patient.  Vivi is a 63 y.o. female who presents today with the followin. Hyperglycemia    2. Hyperlipidemia, unspecified hyperlipidemia type    3. Hypothyroidism, unspecified type    4. CAD in native artery    5. Class 2 obesity due to excess calories without serious comorbidity with body mass index (BMI) of 35.0 to 35.9 in adult    6. Primary hypertension    7. Malignant neoplasm of colon, unspecified part of colon (HCC)    Patient presents for follow-up of chronic medical problems.  Patient is taking all of her medication as prescribed and she has no complaints today.     REVIEW OF SYMPTOMS    Review of Systems    PAST MEDICAL HISTORY  Past Medical History:   Diagnosis Date    CHF (congestive heart failure) (HCC)     Hyperlipidemia     Hypertension     Hypothyroidism 2022       FAMILY HISTORY  Family History   Problem Relation Age of Onset    Heart Failure Mother          at age 86 years, was a smoker    Atrial Fibrillation Mother     Heart Failure Father          at age 69, was a smoker    Diabetes Father     Heart Attack Sister          at age 53 years, smoked    Arthritis Sister         smoked    Diabetes type 2  Brother         smoked    Diabetes type 2  Brother         smoked    Obesity Brother     Diabetes Brother         did not smoke       SOCIAL HISTORY  Social History     Socioeconomic History    Marital status: Legally      Spouse name: 2nd : Mr. Donnie Heaton    Number of children: 0    Years of education: HS  and 3 yrs college    Highest education level: None   Occupational History    Occupation: Nurses Assistant at [employer]     Comment: retired   Tobacco Use    Smoking status: Former     Current packs/day: 0.00     Average packs/day: 0.3 packs/day for 43.0 years (10.8 ttl pk-yrs)

## 2024-05-16 ENCOUNTER — OFFICE VISIT (OUTPATIENT)
Dept: ONCOLOGY | Age: 64
End: 2024-05-16
Payer: MEDICARE

## 2024-05-16 ENCOUNTER — HOSPITAL ENCOUNTER (OUTPATIENT)
Dept: INFUSION THERAPY | Age: 64
Discharge: HOME | End: 2024-05-16
Payer: MEDICARE

## 2024-05-16 VITALS
SYSTOLIC BLOOD PRESSURE: 104 MMHG | WEIGHT: 194.2 LBS | OXYGEN SATURATION: 100 % | BODY MASS INDEX: 38.13 KG/M2 | HEART RATE: 69 BPM | DIASTOLIC BLOOD PRESSURE: 76 MMHG | HEIGHT: 60 IN | TEMPERATURE: 97.3 F

## 2024-05-16 DIAGNOSIS — C18.2 MALIGNANT NEOPLASM OF ASCENDING COLON (HCC): ICD-10-CM

## 2024-05-16 DIAGNOSIS — C18.2 MALIGNANT NEOPLASM OF ASCENDING COLON (HCC): Primary | ICD-10-CM

## 2024-05-16 LAB
ALBUMIN SERPL-MCNC: 3.7 G/DL (ref 3.5–5.2)
ALP SERPL-CCNC: 105 U/L (ref 35–104)
ALT SERPL-CCNC: 11 U/L (ref 0–32)
ANION GAP SERPL CALCULATED.3IONS-SCNC: 10 MMOL/L (ref 7–16)
AST SERPL-CCNC: 11 U/L (ref 0–31)
BASOPHILS # BLD: 0 K/UL (ref 0–0.2)
BASOPHILS NFR BLD: 0 % (ref 0–2)
BILIRUB SERPL-MCNC: 0.6 MG/DL (ref 0–1.2)
BUN SERPL-MCNC: 20 MG/DL (ref 6–23)
CALCIUM SERPL-MCNC: 8.9 MG/DL (ref 8.6–10.2)
CEA SERPL-MCNC: 4.3 NG/ML (ref 0–5.2)
CHLORIDE SERPL-SCNC: 102 MMOL/L (ref 98–107)
CO2 SERPL-SCNC: 24 MMOL/L (ref 22–29)
CREAT SERPL-MCNC: 1 MG/DL (ref 0.5–1)
EOSINOPHIL # BLD: 0.06 K/UL (ref 0.05–0.5)
EOSINOPHILS RELATIVE PERCENT: 1 % (ref 0–6)
ERYTHROCYTE [DISTWIDTH] IN BLOOD BY AUTOMATED COUNT: 20.4 % (ref 11.5–15)
GFR, ESTIMATED: 65 ML/MIN/1.73M2
GLUCOSE SERPL-MCNC: 151 MG/DL (ref 74–99)
HCT VFR BLD AUTO: 41.8 % (ref 34–48)
HGB BLD-MCNC: 13.8 G/DL (ref 11.5–15.5)
LYMPHOCYTES NFR BLD: 1.88 K/UL (ref 1.5–4)
LYMPHOCYTES RELATIVE PERCENT: 28 % (ref 20–42)
MCH RBC QN AUTO: 30.3 PG (ref 26–35)
MCHC RBC AUTO-ENTMCNC: 33 G/DL (ref 32–34.5)
MCV RBC AUTO: 91.7 FL (ref 80–99.9)
MONOCYTES NFR BLD: 0.65 K/UL (ref 0.1–0.95)
MONOCYTES NFR BLD: 10 % (ref 2–12)
NEUTROPHILS NFR BLD: 61 % (ref 43–80)
NEUTS SEG NFR BLD: 4.11 K/UL (ref 1.8–7.3)
PLATELET # BLD AUTO: 263 K/UL (ref 130–450)
PMV BLD AUTO: 10.7 FL (ref 7–12)
POTASSIUM SERPL-SCNC: 4.1 MMOL/L (ref 3.5–5)
PROT SERPL-MCNC: 9.2 G/DL (ref 6.4–8.3)
RBC # BLD AUTO: 4.56 M/UL (ref 3.5–5.5)
RBC # BLD: ABNORMAL 10*6/UL
SODIUM SERPL-SCNC: 136 MMOL/L (ref 132–146)
WBC OTHER # BLD: 6.7 K/UL (ref 4.5–11.5)

## 2024-05-16 PROCEDURE — 36415 COLL VENOUS BLD VENIPUNCTURE: CPT

## 2024-05-16 PROCEDURE — 82378 CARCINOEMBRYONIC ANTIGEN: CPT

## 2024-05-16 PROCEDURE — 99212 OFFICE O/P EST SF 10 MIN: CPT

## 2024-05-16 PROCEDURE — 85025 COMPLETE CBC W/AUTO DIFF WBC: CPT

## 2024-05-16 PROCEDURE — 80053 COMPREHEN METABOLIC PANEL: CPT

## 2024-05-16 NOTE — PROGRESS NOTES
No peripheral lymphadenopathy.  Lungs: Clear to auscultation   Heart: Regular rate and regular rhythm, no murmur, normal S1, S2  Abdomen: Soft, non-tender;no masses, no organomegaly  Extremities: No edema,no cyanosis, no clubbing.   Skin:  No rash.  Neurologic:Cranial nerves grossly intact. No focal motor or sensory deficits .    Recent Laboratory Data-   Lab Results   Component Value Date    WBC 6.7 05/16/2024    HGB 13.8 05/16/2024    HCT 41.8 05/16/2024    MCV 91.7 05/16/2024     05/16/2024    LYMPHOPCT 28 05/16/2024    RBC 4.56 05/16/2024    MCH 30.3 05/16/2024    MCHC 33.0 05/16/2024    RDW 20.4 (H) 05/16/2024    NEUTOPHILPCT 61 05/16/2024    MONOPCT 10 05/16/2024    EOSPCT 1 05/16/2024    BASOPCT 0 05/16/2024    NEUTROABS 4.11 05/16/2024    MONOSABS 0.65 05/16/2024    EOSABS 0.06 05/16/2024    BASOSABS 0.00 05/16/2024       Lab Results   Component Value Date     05/16/2024    K 4.1 05/16/2024     05/16/2024    CO2 24 05/16/2024    BUN 20 05/16/2024    CREATININE 1.0 05/16/2024    GLUCOSE 151 (H) 05/16/2024    CALCIUM 8.9 05/16/2024    BILITOT 0.6 05/16/2024    ALKPHOS 105 (H) 05/16/2024    AST 11 05/16/2024    ALT 11 05/16/2024    LABGLOM 65 05/16/2024    GFRAA 55 10/14/2022       Lab Results   Component Value Date    IRON 46 01/19/2023    TIBC 420 01/19/2023    FERRITIN 18 01/19/2023           Radiology-    No results found.      ASSESSMENT/PLAN :    Vivi was seen today for follow-up.    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  Stage II pT3 pN0 M0 ascending colon CA s/p right

## 2024-05-20 RX ORDER — FUROSEMIDE 20 MG/1
20 TABLET ORAL DAILY PRN
Qty: 90 TABLET | Refills: 2 | Status: SHIPPED | OUTPATIENT
Start: 2024-05-20

## 2024-06-20 ENCOUNTER — HOSPITAL ENCOUNTER (OUTPATIENT)
Dept: INFUSION THERAPY | Age: 64
Discharge: HOME | End: 2024-06-20
Payer: MEDICARE

## 2024-06-20 ENCOUNTER — OFFICE VISIT (OUTPATIENT)
Dept: ONCOLOGY | Age: 64
End: 2024-06-20
Payer: MEDICARE

## 2024-06-20 VITALS
RESPIRATION RATE: 18 BRPM | OXYGEN SATURATION: 99 % | SYSTOLIC BLOOD PRESSURE: 116 MMHG | BODY MASS INDEX: 38.65 KG/M2 | WEIGHT: 197.9 LBS | HEART RATE: 74 BPM | TEMPERATURE: 96.1 F | DIASTOLIC BLOOD PRESSURE: 57 MMHG

## 2024-06-20 DIAGNOSIS — C18.2 MALIGNANT NEOPLASM OF ASCENDING COLON (HCC): Primary | ICD-10-CM

## 2024-06-20 DIAGNOSIS — C18.2 MALIGNANT NEOPLASM OF ASCENDING COLON (HCC): ICD-10-CM

## 2024-06-20 LAB
ALBUMIN SERPL-MCNC: 3.6 G/DL (ref 3.5–5.2)
ALP SERPL-CCNC: 98 U/L (ref 35–104)
ALT SERPL-CCNC: 14 U/L (ref 0–32)
ANION GAP SERPL CALCULATED.3IONS-SCNC: 7 MMOL/L (ref 7–16)
AST SERPL-CCNC: 16 U/L (ref 0–31)
BASOPHILS # BLD: 0.05 K/UL (ref 0–0.2)
BASOPHILS NFR BLD: 1 % (ref 0–2)
BILIRUB SERPL-MCNC: 0.5 MG/DL (ref 0–1.2)
BUN SERPL-MCNC: 16 MG/DL (ref 6–23)
CALCIUM SERPL-MCNC: 8.9 MG/DL (ref 8.6–10.2)
CEA SERPL-MCNC: 4 NG/ML (ref 0–5.2)
CHLORIDE SERPL-SCNC: 102 MMOL/L (ref 98–107)
CO2 SERPL-SCNC: 25 MMOL/L (ref 22–29)
CREAT SERPL-MCNC: 0.9 MG/DL (ref 0.5–1)
EOSINOPHIL # BLD: 0.15 K/UL (ref 0.05–0.5)
EOSINOPHILS RELATIVE PERCENT: 3 % (ref 0–6)
ERYTHROCYTE [DISTWIDTH] IN BLOOD BY AUTOMATED COUNT: 21 % (ref 11.5–15)
GFR, ESTIMATED: 68 ML/MIN/1.73M2
GLUCOSE SERPL-MCNC: 123 MG/DL (ref 74–99)
HCT VFR BLD AUTO: 40 % (ref 34–48)
HGB BLD-MCNC: 13.3 G/DL (ref 11.5–15.5)
LYMPHOCYTES NFR BLD: 1.97 K/UL (ref 1.5–4)
LYMPHOCYTES RELATIVE PERCENT: 34 % (ref 20–42)
MCH RBC QN AUTO: 31.4 PG (ref 26–35)
MCHC RBC AUTO-ENTMCNC: 33.3 G/DL (ref 32–34.5)
MCV RBC AUTO: 94.6 FL (ref 80–99.9)
MONOCYTES NFR BLD: 0.76 K/UL (ref 0.1–0.95)
MONOCYTES NFR BLD: 13 % (ref 2–12)
NEUTROPHILS NFR BLD: 50 % (ref 43–80)
NEUTS SEG NFR BLD: 2.87 K/UL (ref 1.8–7.3)
PLATELET # BLD AUTO: 284 K/UL (ref 130–450)
PMV BLD AUTO: 11.1 FL (ref 7–12)
POTASSIUM SERPL-SCNC: 4.3 MMOL/L (ref 3.5–5)
PROT SERPL-MCNC: 9 G/DL (ref 6.4–8.3)
RBC # BLD AUTO: 4.23 M/UL (ref 3.5–5.5)
RBC # BLD: ABNORMAL 10*6/UL
SODIUM SERPL-SCNC: 134 MMOL/L (ref 132–146)
WBC OTHER # BLD: 5.8 K/UL (ref 4.5–11.5)

## 2024-06-20 PROCEDURE — 80053 COMPREHEN METABOLIC PANEL: CPT

## 2024-06-20 PROCEDURE — 36415 COLL VENOUS BLD VENIPUNCTURE: CPT

## 2024-06-20 PROCEDURE — 85025 COMPLETE CBC W/AUTO DIFF WBC: CPT

## 2024-06-20 PROCEDURE — 99212 OFFICE O/P EST SF 10 MIN: CPT

## 2024-06-20 PROCEDURE — 82378 CARCINOEMBRYONIC ANTIGEN: CPT

## 2024-06-20 NOTE — PROGRESS NOTES
[facility]  [address] [place] [address]   Hematology/Oncology  Consult      Patient Name: Vivi Heaton  YOB: 1960  PCP: Jonas Luz DO   Referring Provider:      Reason for Consultation:   No chief complaint on file.     Interval history: Reports diarrhea 2 weeks into her last chemo cycle.  4-5 episodes a day.  Lasted 3 to 4 days.  Feels better today.    History of Present Illness:  62 years old female referred by Dr. Bradford, for abnormal SPEP.  She was admitted in August 2022 with congestive heart failure and NSTEMI.  Found to have coronary artery disease, EF of 10%, left atrial thrombus.  On GDMT and Eliquis and LifeVest.  Serum immunofixation showed monoclonal IgG G kappa.  M spike 2.4.  Serum free kappa was 22, lambda 14 with a normal ratio of 1.5.  Random UPEP also showed monoclonal IgG kappa.  Chemistry from 2 weeks ago show creatinine of 1.2, calcium 9.4.  No recent hemoglobin available.  Patient denies any bony pain.  Cardiac MRI has been ordered to rule out cardiac amyloidosis.    S/p bone marrow biopsy February 2023. Pathology showed 30% kappa restricted plasma cells, normal female karyotype.  FISH showed 13 monosomy as well as IgH abnormality.  IgH rearrangement analysis could not be done due to limited specimen.    Hemoglobin creatinine calcium normal.  No bony pain.    Skeletal survey survey showed no lytic lesions.  Left sacroiliac sclerotic lesion was noted.  CT pelvis did not show any bone lesion      Found to have polyps on a screening colonoscopy by Dr. Hogan in January 2024.  Biopsy came back positive for adenocarcinoma.  Patient s/p right hemicolectomy at [facility] on February 28, 2024.  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  Lymphovascular invasion and perineural invasion were noted.  CT chest 7 pelvis were

## 2024-07-08 DIAGNOSIS — C18.2 MALIGNANT NEOPLASM OF ASCENDING COLON (HCC): Primary | ICD-10-CM

## 2024-07-08 RX ORDER — CAPECITABINE 500 MG/1
500 TABLET, FILM COATED ORAL 2 TIMES DAILY
COMMUNITY
Start: 2024-06-10 | End: 2024-07-08 | Stop reason: SDUPTHER

## 2024-07-08 RX ORDER — CAPECITABINE 500 MG/1
TABLET, FILM COATED ORAL
Qty: 112 TABLET | Refills: 0 | Status: ACTIVE | OUTPATIENT
Start: 2024-07-08 | End: 2024-07-11 | Stop reason: SDUPTHER

## 2024-07-11 ENCOUNTER — HOSPITAL ENCOUNTER (OUTPATIENT)
Dept: INFUSION THERAPY | Age: 64
Discharge: HOME | End: 2024-07-11
Payer: MEDICARE

## 2024-07-11 ENCOUNTER — OFFICE VISIT (OUTPATIENT)
Dept: ONCOLOGY | Age: 64
End: 2024-07-11

## 2024-07-11 VITALS
DIASTOLIC BLOOD PRESSURE: 79 MMHG | HEART RATE: 67 BPM | SYSTOLIC BLOOD PRESSURE: 135 MMHG | HEIGHT: 60 IN | TEMPERATURE: 97.1 F | WEIGHT: 201.6 LBS | BODY MASS INDEX: 39.58 KG/M2 | OXYGEN SATURATION: 99 %

## 2024-07-11 DIAGNOSIS — C18.2 MALIGNANT NEOPLASM OF ASCENDING COLON (HCC): ICD-10-CM

## 2024-07-11 LAB
ALBUMIN SERPL-MCNC: 3.6 G/DL (ref 3.5–5.2)
ALP SERPL-CCNC: 101 U/L (ref 35–104)
ALT SERPL-CCNC: 20 U/L (ref 0–32)
ANION GAP SERPL CALCULATED.3IONS-SCNC: 9 MMOL/L (ref 7–16)
AST SERPL-CCNC: 17 U/L (ref 0–31)
ATYPICAL LYMPHOCYTE ABSOLUTE COUNT: 0.39 K/UL (ref 0–0.46)
ATYPICAL LYMPHOCYTES: 5 % (ref 0–4)
BASOPHILS # BLD: 0.08 K/UL (ref 0–0.2)
BASOPHILS NFR BLD: 1 % (ref 0–2)
BILIRUB SERPL-MCNC: 0.2 MG/DL (ref 0–1.2)
BUN SERPL-MCNC: 15 MG/DL (ref 6–23)
CALCIUM SERPL-MCNC: 8.8 MG/DL (ref 8.6–10.2)
CEA SERPL-MCNC: 3.7 NG/ML (ref 0–5.2)
CHLORIDE SERPL-SCNC: 103 MMOL/L (ref 98–107)
CO2 SERPL-SCNC: 26 MMOL/L (ref 22–29)
CREAT SERPL-MCNC: 0.9 MG/DL (ref 0.5–1)
EOSINOPHIL # BLD: 0.15 K/UL (ref 0.05–0.5)
EOSINOPHILS RELATIVE PERCENT: 2 % (ref 0–6)
ERYTHROCYTE [DISTWIDTH] IN BLOOD BY AUTOMATED COUNT: 20.2 % (ref 11.5–15)
GFR, ESTIMATED: 73 ML/MIN/1.73M2
GLUCOSE SERPL-MCNC: 119 MG/DL (ref 74–99)
HCT VFR BLD AUTO: 41.7 % (ref 34–48)
HGB BLD-MCNC: 13.4 G/DL (ref 11.5–15.5)
LYMPHOCYTES NFR BLD: 2.85 K/UL (ref 1.5–4)
LYMPHOCYTES RELATIVE PERCENT: 37 % (ref 20–42)
MCH RBC QN AUTO: 30.8 PG (ref 26–35)
MCHC RBC AUTO-ENTMCNC: 32.1 G/DL (ref 32–34.5)
MCV RBC AUTO: 95.9 FL (ref 80–99.9)
MONOCYTES NFR BLD: 0.92 K/UL (ref 0.1–0.95)
MONOCYTES NFR BLD: 12 % (ref 2–12)
NEUTROPHILS NFR BLD: 43 % (ref 43–80)
NEUTS SEG NFR BLD: 3.31 K/UL (ref 1.8–7.3)
PLATELET # BLD AUTO: 282 K/UL (ref 130–450)
PMV BLD AUTO: 10.9 FL (ref 7–12)
POTASSIUM SERPL-SCNC: 4.2 MMOL/L (ref 3.5–5)
PROT SERPL-MCNC: 8.9 G/DL (ref 6.4–8.3)
RBC # BLD AUTO: 4.35 M/UL (ref 3.5–5.5)
RBC # BLD: ABNORMAL 10*6/UL
SODIUM SERPL-SCNC: 138 MMOL/L (ref 132–146)
WBC OTHER # BLD: 7.7 K/UL (ref 4.5–11.5)

## 2024-07-11 PROCEDURE — 36415 COLL VENOUS BLD VENIPUNCTURE: CPT

## 2024-07-11 PROCEDURE — 82378 CARCINOEMBRYONIC ANTIGEN: CPT

## 2024-07-11 PROCEDURE — 80053 COMPREHEN METABOLIC PANEL: CPT

## 2024-07-11 PROCEDURE — 85025 COMPLETE CBC W/AUTO DIFF WBC: CPT

## 2024-07-11 RX ORDER — CAPECITABINE 500 MG/1
TABLET, FILM COATED ORAL
Qty: 112 TABLET | Refills: 0 | Status: ACTIVE | OUTPATIENT
Start: 2024-07-11

## 2024-07-11 NOTE — PROGRESS NOTES
[facility]  [address] [place] [address]   Hematology/Oncology  Consult      Patient Name: Vivi Heaton  YOB: 1960  PCP: Jonas Luz DO   Referring Provider:      Reason for Consultation:   Chief Complaint   Patient presents with    Follow-up     Malignant neoplasm of ascending colon      Interval history: Tolerated treatment well.  No diarrhea or rash.    History of Present Illness:  62 years old female referred by Dr. Bradford, for abnormal SPEP.  She was admitted in August 2022 with congestive heart failure and NSTEMI.  Found to have coronary artery disease, EF of 10%, left atrial thrombus.  On GDMT and Eliquis and LifeVest.  Serum immunofixation showed monoclonal IgG G kappa.  M spike 2.4.  Serum free kappa was 22, lambda 14 with a normal ratio of 1.5.  Random UPEP also showed monoclonal IgG kappa.  Chemistry from 2 weeks ago show creatinine of 1.2, calcium 9.4.  No recent hemoglobin available.  Patient denies any bony pain.  Cardiac MRI has been ordered to rule out cardiac amyloidosis.    S/p bone marrow biopsy February 2023. Pathology showed 30% kappa restricted plasma cells, normal female karyotype.  FISH showed 13 monosomy as well as IgH abnormality.  IgH rearrangement analysis could not be done due to limited specimen.    Hemoglobin creatinine calcium normal.  No bony pain.    Skeletal survey survey showed no lytic lesions.  Left sacroiliac sclerotic lesion was noted.  CT pelvis did not show any bone lesion      Found to have polyps on a screening colonoscopy by Dr. Hogan in January 2024.  Biopsy came back positive for adenocarcinoma.  Patient s/p right hemicolectomy at [facility] on February 28, 2024.  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  Lymphovascular invasion and perineural invasion were noted.  CT chest 7 pelvis were

## 2024-07-15 ENCOUNTER — HOSPITAL ENCOUNTER (OUTPATIENT)
Dept: OTHER | Age: 64
Setting detail: THERAPIES SERIES
Discharge: HOME | End: 2024-07-15
Payer: MEDICARE

## 2024-07-15 ENCOUNTER — TELEPHONE (OUTPATIENT)
Dept: INFUSION THERAPY | Age: 64
End: 2024-07-15

## 2024-07-15 VITALS
DIASTOLIC BLOOD PRESSURE: 73 MMHG | HEART RATE: 67 BPM | SYSTOLIC BLOOD PRESSURE: 144 MMHG | OXYGEN SATURATION: 92 % | WEIGHT: 199.03 LBS | BODY MASS INDEX: 38.87 KG/M2 | RESPIRATION RATE: 18 BRPM

## 2024-07-15 LAB
ANION GAP SERPL CALCULATED.3IONS-SCNC: 9 MMOL/L (ref 7–16)
BNP SERPL-MCNC: <36 PG/ML (ref 0–450)
BUN SERPL-MCNC: 24 MG/DL (ref 6–23)
CALCIUM SERPL-MCNC: 9 MG/DL (ref 8.6–10.2)
CHLORIDE SERPL-SCNC: 102 MMOL/L (ref 98–107)
CO2 SERPL-SCNC: 24 MMOL/L (ref 22–29)
CREAT SERPL-MCNC: 0.9 MG/DL (ref 0.5–1)
GFR, ESTIMATED: 71 ML/MIN/1.73M2
GLUCOSE SERPL-MCNC: 130 MG/DL (ref 74–99)
POTASSIUM SERPL-SCNC: 4.6 MMOL/L (ref 3.5–5)
SODIUM SERPL-SCNC: 135 MMOL/L (ref 132–146)

## 2024-07-15 PROCEDURE — 99214 OFFICE O/P EST MOD 30 MIN: CPT

## 2024-07-15 PROCEDURE — 36415 COLL VENOUS BLD VENIPUNCTURE: CPT

## 2024-07-15 PROCEDURE — 80048 BASIC METABOLIC PNL TOTAL CA: CPT

## 2024-07-15 PROCEDURE — 83880 ASSAY OF NATRIURETIC PEPTIDE: CPT

## 2024-07-15 ASSESSMENT — PATIENT HEALTH QUESTIONNAIRE - PHQ9
SUM OF ALL RESPONSES TO PHQ QUESTIONS 1-9: 0
SUM OF ALL RESPONSES TO PHQ QUESTIONS 1-9: 0
1. LITTLE INTEREST OR PLEASURE IN DOING THINGS: NOT AT ALL
SUM OF ALL RESPONSES TO PHQ QUESTIONS 1-9: 0
SUM OF ALL RESPONSES TO PHQ QUESTIONS 1-9: 0
SUM OF ALL RESPONSES TO PHQ9 QUESTIONS 1 & 2: 0
2. FEELING DOWN, DEPRESSED OR HOPELESS: NOT AT ALL

## 2024-07-15 NOTE — TELEPHONE ENCOUNTER
Per [facility] pharmacy, [facility], patient's Xeloda has shipped Friday for $5.03 co-pay. Voiced understanding.

## 2024-07-15 NOTE — PROGRESS NOTES
Congestive Heart Failure Clinic   [facility]       Referring Provider: Dr. Bradford  Primary Care Physician: Jonas Luz DO   Cardiologist: Dr. Bradford/Tanesha Aceves  Nephrologist: N/A    HISTORY OF PRESENT ILLNESS:   Vivi Heaton is a 63 y.o. (1960) female with a history of HFimpEF, most recent EF:  Lab Results   Component Value Date    LVEF 50 12/29/2022       She presents to the CHF clinic for ongoing evaluation and monitoring of heart failure.    In the CHF clinic today she denies any adverse symptoms except:  [] Dizziness or lightheadedness   [] Syncope or near syncope  [] Recent Fall  [] Shortness of breath at rest   [] Dyspnea with exertion  [] Decline in functional capacity (unable to perform activities they had previously been able to do)  [] Fatigue   [] Orthopnea  [] PND  [] Nocturnal cough  [] Hemoptysis  [] Chest pain, pressure, or discomfort  [] Palpitations  [] Abdominal distention  [] Abdominal bloating  [] Early satiety  [] Blood in stool   [x] Diarrhea  [] Constipation  [] Nausea/Vomiting  [] Decreased urinary response to oral diuretic   [] Scrotal swelling   [] Lower extremity edema  [] Used PRN doses of oral diuretic   [x] Weight gain    Wt Readings from Last 3 Encounters:   07/15/24 90.3 kg (199 lb 0.4 oz)   07/11/24 91.4 kg (201 lb 9.6 oz)   06/20/24 89.8 kg (197 lb 14.4 oz)       SOCIAL HISTORY:  [x] Denies tobacco, alcohol or illicit drug abuse  [] Tobacco use:  [] ETOH use:  [] Illicit drug use:        MEDICATIONS:    No Known Allergies    Current Outpatient Medications:     capecitabine (XELODA) 500 MG chemo tablet, Take four tablets(2,000mg) by mouth twice a day for 14 days, then 7 days off every 21 day cycle, Disp: 112 tablet, Rfl: 0    furosemide (LASIX) 20 MG tablet, TAKE 1 TABLET BY MOUTH DAILY AS NEEDED (WEIGHT GAIN, SHORTNESS OF BREATH, SWELLING) (Patient not taking: Reported on 7/15/2024), Disp: 90 tablet, Rfl: 2    rosuvastatin

## 2024-07-30 ENCOUNTER — HOSPITAL ENCOUNTER (OUTPATIENT)
Age: 64
Discharge: HOME | End: 2024-07-30
Payer: MEDICARE

## 2024-07-30 DIAGNOSIS — C18.2 MALIGNANT NEOPLASM OF ASCENDING COLON (HCC): ICD-10-CM

## 2024-07-30 LAB
ALBUMIN SERPL-MCNC: 3.9 G/DL (ref 3.5–5.2)
ALP SERPL-CCNC: 92 U/L (ref 35–104)
ALT SERPL-CCNC: 15 U/L (ref 0–32)
ANION GAP SERPL CALCULATED.3IONS-SCNC: 10 MMOL/L (ref 7–16)
AST SERPL-CCNC: 13 U/L (ref 0–31)
BASOPHILS # BLD: 0.04 K/UL (ref 0–0.2)
BASOPHILS NFR BLD: 1 % (ref 0–2)
BILIRUB SERPL-MCNC: 0.5 MG/DL (ref 0–1.2)
BUN SERPL-MCNC: 29 MG/DL (ref 6–23)
CALCIUM SERPL-MCNC: 9.2 MG/DL (ref 8.6–10.2)
CEA SERPL-MCNC: 3.9 NG/ML (ref 0–5.2)
CHLORIDE SERPL-SCNC: 101 MMOL/L (ref 98–107)
CO2 SERPL-SCNC: 22 MMOL/L (ref 22–29)
CREAT SERPL-MCNC: 1.1 MG/DL (ref 0.5–1)
EOSINOPHIL # BLD: 0.06 K/UL (ref 0.05–0.5)
EOSINOPHILS RELATIVE PERCENT: 1 % (ref 0–6)
ERYTHROCYTE [DISTWIDTH] IN BLOOD BY AUTOMATED COUNT: 18.2 % (ref 11.5–15)
GFR, ESTIMATED: 58 ML/MIN/1.73M2
GLUCOSE SERPL-MCNC: 112 MG/DL (ref 74–99)
HCT VFR BLD AUTO: 42.1 % (ref 34–48)
HGB BLD-MCNC: 13.9 G/DL (ref 11.5–15.5)
IMM GRANULOCYTES # BLD AUTO: <0.03 K/UL (ref 0–0.58)
IMM GRANULOCYTES NFR BLD: 0 % (ref 0–5)
LYMPHOCYTES NFR BLD: 2.85 K/UL (ref 1.5–4)
LYMPHOCYTES RELATIVE PERCENT: 38 % (ref 20–42)
MCH RBC QN AUTO: 31.4 PG (ref 26–35)
MCHC RBC AUTO-ENTMCNC: 33 G/DL (ref 32–34.5)
MCV RBC AUTO: 95 FL (ref 80–99.9)
MONOCYTES NFR BLD: 0.82 K/UL (ref 0.1–0.95)
MONOCYTES NFR BLD: 11 % (ref 2–12)
NEUTROPHILS NFR BLD: 50 % (ref 43–80)
NEUTS SEG NFR BLD: 3.77 K/UL (ref 1.8–7.3)
PLATELET # BLD AUTO: 294 K/UL (ref 130–450)
PMV BLD AUTO: 10.8 FL (ref 7–12)
POTASSIUM SERPL-SCNC: 4 MMOL/L (ref 3.5–5)
PROT SERPL-MCNC: 9.5 G/DL (ref 6.4–8.3)
RBC # BLD AUTO: 4.43 M/UL (ref 3.5–5.5)
SODIUM SERPL-SCNC: 133 MMOL/L (ref 132–146)
WBC OTHER # BLD: 7.6 K/UL (ref 4.5–11.5)

## 2024-07-30 PROCEDURE — 36415 COLL VENOUS BLD VENIPUNCTURE: CPT

## 2024-07-30 PROCEDURE — 82378 CARCINOEMBRYONIC ANTIGEN: CPT

## 2024-07-30 PROCEDURE — 85025 COMPLETE CBC W/AUTO DIFF WBC: CPT

## 2024-07-30 PROCEDURE — 86334 IMMUNOFIX E-PHORESIS SERUM: CPT

## 2024-07-30 PROCEDURE — 84165 PROTEIN E-PHORESIS SERUM: CPT

## 2024-07-30 PROCEDURE — 80053 COMPREHEN METABOLIC PANEL: CPT

## 2024-07-30 PROCEDURE — 84155 ASSAY OF PROTEIN SERUM: CPT

## 2024-07-30 PROCEDURE — 83521 IG LIGHT CHAINS FREE EACH: CPT

## 2024-07-31 ENCOUNTER — HOSPITAL ENCOUNTER (OUTPATIENT)
Age: 64
Setting detail: SPECIMEN
Discharge: HOME | End: 2024-07-31
Payer: MEDICARE

## 2024-07-31 DIAGNOSIS — C18.2 MALIGNANT NEOPLASM OF ASCENDING COLON (HCC): ICD-10-CM

## 2024-07-31 LAB
ALBUMIN SERPL-MCNC: 3.7 G/DL (ref 3.5–4.7)
ALPHA1 GLOB SERPL ELPH-MCNC: 0.3 G/DL (ref 0.2–0.4)
ALPHA2 GLOB SERPL ELPH-MCNC: 0.8 G/DL (ref 0.5–1)
B-GLOBULIN SERPL ELPH-MCNC: 1.3 G/DL (ref 0.8–1.3)
FREE KAPPA/LAMBDA RATIO: 4.49 (ref 0.22–1.74)
GAMMA GLOB SERPL ELPH-MCNC: 3.1 G/DL (ref 0.7–1.6)
INTERPRETATION SERPL IFE-IMP: NORMAL
KAPPA LC FREE SER-MCNC: 84.9 MG/L
LAMBDA LC FREE SERPL-MCNC: 18.9 MG/L (ref 4.2–27.7)
M PROTEIN SERPL ELPH-MCNC: 2.7 G/DL
PATH REV: NORMAL
PATHOLOGIST: ABNORMAL
PROT PATTERN SERPL ELPH-IMP: ABNORMAL
PROT SERPL-MCNC: 9.2 G/DL (ref 6.4–8.3)

## 2024-07-31 PROCEDURE — 84166 PROTEIN E-PHORESIS/URINE/CSF: CPT

## 2024-07-31 PROCEDURE — 84156 ASSAY OF PROTEIN URINE: CPT

## 2024-08-01 ENCOUNTER — OFFICE VISIT (OUTPATIENT)
Dept: ONCOLOGY | Age: 64
End: 2024-08-01
Payer: MEDICARE

## 2024-08-01 ENCOUNTER — HOSPITAL ENCOUNTER (OUTPATIENT)
Dept: INFUSION THERAPY | Age: 64
Discharge: HOME | End: 2024-08-01

## 2024-08-01 VITALS
HEART RATE: 60 BPM | TEMPERATURE: 97 F | DIASTOLIC BLOOD PRESSURE: 72 MMHG | SYSTOLIC BLOOD PRESSURE: 121 MMHG | WEIGHT: 194.8 LBS | HEIGHT: 60 IN | OXYGEN SATURATION: 100 % | BODY MASS INDEX: 38.24 KG/M2

## 2024-08-01 DIAGNOSIS — C18.2 MALIGNANT NEOPLASM OF ASCENDING COLON (HCC): Primary | ICD-10-CM

## 2024-08-01 PROCEDURE — 99212 OFFICE O/P EST SF 10 MIN: CPT

## 2024-08-01 NOTE — PROGRESS NOTES
[facility]  [address] [place] [address]   Hematology/Oncology  Consult      Patient Name: Vivi Heaton  YOB: 1960  PCP: Jonas Luz DO   Referring Provider:      Reason for Consultation:   Chief Complaint   Patient presents with    Follow-up     Malignant neoplasm of ascending colon      Interval history: Tolerated treatment well.  No diarrhea or rash.    History of Present Illness:  63 years old female referred by Dr. Bradford, for abnormal SPEP.  She was admitted in August 2022 with congestive heart failure and NSTEMI.  Found to have coronary artery disease, EF of 10%, left atrial thrombus.  On GDMT and Eliquis and LifeVest.  Serum immunofixation showed monoclonal IgG G kappa.  M spike 2.4.  Serum free kappa was 22, lambda 14 with a normal ratio of 1.5.  Random UPEP also showed monoclonal IgG kappa.  Chemistry from 2 weeks ago show creatinine of 1.2, calcium 9.4.  No recent hemoglobin available.  Patient denies any bony pain.  Cardiac MRI has been ordered to rule out cardiac amyloidosis.    S/p bone marrow biopsy February 2023. Pathology showed 30% kappa restricted plasma cells, normal female karyotype.  FISH showed 13 monosomy as well as IgH abnormality.  IgH rearrangement analysis could not be done due to limited specimen.    Hemoglobin creatinine calcium normal.  No bony pain.    Skeletal survey survey showed no lytic lesions.  Left sacroiliac sclerotic lesion was noted.  CT pelvis did not show any bone lesion      Found to have polyps on a screening colonoscopy by Dr. Hogan in January 2024.  Biopsy came back positive for adenocarcinoma.  Patient s/p right hemicolectomy at [facility] on February 28, 2024.  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  Lymphovascular invasion and perineural invasion were noted.  CT chest 7 pelvis were

## 2024-08-02 LAB
P E INTERPRETATION, U: NORMAL
PATHOLOGIST: NORMAL
SPECIMEN TYPE: NORMAL
SPECIMEN TYPE: NORMAL
URINE IFX INTERP: NORMAL

## 2024-08-06 DIAGNOSIS — C18.2 MALIGNANT NEOPLASM OF ASCENDING COLON (HCC): ICD-10-CM

## 2024-08-06 RX ORDER — CAPECITABINE 500 MG/1
TABLET, FILM COATED ORAL
Qty: 112 TABLET | Refills: 0 | Status: ACTIVE | OUTPATIENT
Start: 2024-08-06

## 2024-08-29 ENCOUNTER — HOSPITAL ENCOUNTER (OUTPATIENT)
Dept: INFUSION THERAPY | Age: 64
Discharge: HOME | End: 2024-08-29
Payer: MEDICARE

## 2024-08-29 ENCOUNTER — OFFICE VISIT (OUTPATIENT)
Dept: ONCOLOGY | Age: 64
End: 2024-08-29
Payer: MEDICARE

## 2024-08-29 VITALS
TEMPERATURE: 96.9 F | OXYGEN SATURATION: 98 % | SYSTOLIC BLOOD PRESSURE: 168 MMHG | BODY MASS INDEX: 38.96 KG/M2 | HEART RATE: 62 BPM | RESPIRATION RATE: 18 BRPM | WEIGHT: 199.5 LBS | DIASTOLIC BLOOD PRESSURE: 70 MMHG

## 2024-08-29 DIAGNOSIS — C18.2 MALIGNANT NEOPLASM OF ASCENDING COLON (HCC): ICD-10-CM

## 2024-08-29 DIAGNOSIS — C18.2 MALIGNANT NEOPLASM OF ASCENDING COLON (HCC): Primary | ICD-10-CM

## 2024-08-29 LAB
ALBUMIN SERPL-MCNC: 3.7 G/DL (ref 3.5–5.2)
ALP SERPL-CCNC: 97 U/L (ref 35–104)
ALT SERPL-CCNC: 15 U/L (ref 0–32)
ANION GAP SERPL CALCULATED.3IONS-SCNC: 5 MMOL/L (ref 7–16)
AST SERPL-CCNC: 15 U/L (ref 0–31)
BASOPHILS # BLD: 0.04 K/UL (ref 0–0.2)
BASOPHILS NFR BLD: 1 % (ref 0–2)
BILIRUB SERPL-MCNC: 0.4 MG/DL (ref 0–1.2)
BUN SERPL-MCNC: 11 MG/DL (ref 6–23)
CALCIUM SERPL-MCNC: 8.7 MG/DL (ref 8.6–10.2)
CEA SERPL-MCNC: 4.6 NG/ML (ref 0–5.2)
CHLORIDE SERPL-SCNC: 103 MMOL/L (ref 98–107)
CO2 SERPL-SCNC: 27 MMOL/L (ref 22–29)
CREAT SERPL-MCNC: 1 MG/DL (ref 0.5–1)
EOSINOPHIL # BLD: 0.09 K/UL (ref 0.05–0.5)
EOSINOPHILS RELATIVE PERCENT: 1 % (ref 0–6)
ERYTHROCYTE [DISTWIDTH] IN BLOOD BY AUTOMATED COUNT: 15.6 % (ref 11.5–15)
GFR, ESTIMATED: 65 ML/MIN/1.73M2
GLUCOSE SERPL-MCNC: 111 MG/DL (ref 74–99)
HCT VFR BLD AUTO: 42.6 % (ref 34–48)
HGB BLD-MCNC: 13.9 G/DL (ref 11.5–15.5)
IMM GRANULOCYTES # BLD AUTO: <0.03 K/UL (ref 0–0.58)
IMM GRANULOCYTES NFR BLD: 0 % (ref 0–5)
LYMPHOCYTES NFR BLD: 2.44 K/UL (ref 1.5–4)
LYMPHOCYTES RELATIVE PERCENT: 39 % (ref 20–42)
MCH RBC QN AUTO: 32.3 PG (ref 26–35)
MCHC RBC AUTO-ENTMCNC: 32.6 G/DL (ref 32–34.5)
MCV RBC AUTO: 99.1 FL (ref 80–99.9)
MONOCYTES NFR BLD: 0.59 K/UL (ref 0.1–0.95)
MONOCYTES NFR BLD: 9 % (ref 2–12)
NEUTROPHILS NFR BLD: 50 % (ref 43–80)
NEUTS SEG NFR BLD: 3.13 K/UL (ref 1.8–7.3)
PLATELET # BLD AUTO: 267 K/UL (ref 130–450)
PMV BLD AUTO: 11.6 FL (ref 7–12)
POTASSIUM SERPL-SCNC: 4.1 MMOL/L (ref 3.5–5)
PROT SERPL-MCNC: 9 G/DL (ref 6.4–8.3)
RBC # BLD AUTO: 4.3 M/UL (ref 3.5–5.5)
SODIUM SERPL-SCNC: 135 MMOL/L (ref 132–146)
WBC OTHER # BLD: 6.3 K/UL (ref 4.5–11.5)

## 2024-08-29 PROCEDURE — 80053 COMPREHEN METABOLIC PANEL: CPT

## 2024-08-29 PROCEDURE — 85025 COMPLETE CBC W/AUTO DIFF WBC: CPT

## 2024-08-29 PROCEDURE — 99212 OFFICE O/P EST SF 10 MIN: CPT

## 2024-08-29 PROCEDURE — 82378 CARCINOEMBRYONIC ANTIGEN: CPT

## 2024-08-29 PROCEDURE — 36415 COLL VENOUS BLD VENIPUNCTURE: CPT

## 2024-08-29 NOTE — PROGRESS NOTES
[facility]  [address] [place] [address]   Hematology/Oncology  Consult      Patient Name: Vivi Heaton  YOB: 1960  PCP: Jonas Luz DO   Referring Provider:      Reason for Consultation:   No chief complaint on file.     Interval history: Tolerated treatment well.  No diarrhea or rash.    History of Present Illness:  63 years old female referred by Dr. Bradford, for abnormal SPEP.  She was admitted in August 2022 with congestive heart failure and NSTEMI.  Found to have coronary artery disease, EF of 10%, left atrial thrombus.  On GDMT and Eliquis and LifeVest.  Serum immunofixation showed monoclonal IgG G kappa.  M spike 2.4.  Serum free kappa was 22, lambda 14 with a normal ratio of 1.5.  Random UPEP also showed monoclonal IgG kappa.  Chemistry from 2 weeks ago show creatinine of 1.2, calcium 9.4.  No recent hemoglobin available.  Patient denies any bony pain.  Cardiac MRI has been ordered to rule out cardiac amyloidosis.    S/p bone marrow biopsy February 2023. Pathology showed 30% kappa restricted plasma cells, normal female karyotype.  FISH showed 13 monosomy as well as IgH abnormality.  IgH rearrangement analysis could not be done due to limited specimen.    Hemoglobin creatinine calcium normal.  No bony pain.    Skeletal survey survey showed no lytic lesions.  Left sacroiliac sclerotic lesion was noted.  CT pelvis did not show any bone lesion      Found to have polyps on a screening colonoscopy by Dr. Hogan in January 2024.  Biopsy came back positive for adenocarcinoma.  Patient s/p right hemicolectomy at [facility] on February 28, 2024.  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  Lymphovascular invasion and perineural invasion were noted.  CT chest 7 pelvis were negative for metastasis.  Initial CEA was 3.3.    Started on adjuvant  ca with high risk features PNI, LVI.  Started capecitabine 1000 mg/m² (2000 mg) twice daily day 1-14 q. 21 days in April 2024.  Cycle 7-day 7.  Tolerated treatment well.  No diarrhea no rash.  CBC CMP unremarkable. Will continue for total of 8 cycles.   Would not benefit from addition of oxaliplatin.  Bilateral lung nodules less than 4 mm.  Will continue to monitor. Will repeat scans after next cycle.      Smoldering multiple myeloma: Labs from yesterday (July 2024) show SPEP with an M spike of 2.7, 2.4, 3 months ago).  Serum free kappa 84, ratio 4.49-stable.  UPEP negative.  Hemoglobin calcium creatinine unremarkable no bony pain.  Will continue to monitor labs every 3 months.  No bone lesions on recent ct scans at [facility] in 1/2024.     Patient's other medical comorbidities include coronary artery disease, congestive heart failure, history of atrial thrombus, on GDMT Eliquis. Cardiac MRI not suggestive of cardiac amyloidosis.  EF improved.  Follows with Dr. Bradford.    Return to clinic in 3 weeks.      Return in about 3 weeks (around 9/19/2024).     Fabian Welch MD   Electronically signed 8/29/2024 at 11:48 AM

## 2024-09-05 ENCOUNTER — OFFICE VISIT (OUTPATIENT)
Dept: FAMILY MEDICINE CLINIC | Age: 64
End: 2024-09-05
Payer: MEDICARE

## 2024-09-05 VITALS
DIASTOLIC BLOOD PRESSURE: 76 MMHG | HEIGHT: 60 IN | OXYGEN SATURATION: 98 % | BODY MASS INDEX: 39.27 KG/M2 | WEIGHT: 200 LBS | RESPIRATION RATE: 16 BRPM | TEMPERATURE: 97 F | SYSTOLIC BLOOD PRESSURE: 128 MMHG | HEART RATE: 74 BPM

## 2024-09-05 DIAGNOSIS — E66.09 CLASS 2 OBESITY DUE TO EXCESS CALORIES WITHOUT SERIOUS COMORBIDITY WITH BODY MASS INDEX (BMI) OF 35.0 TO 35.9 IN ADULT: ICD-10-CM

## 2024-09-05 DIAGNOSIS — E78.5 HYPERLIPIDEMIA, UNSPECIFIED HYPERLIPIDEMIA TYPE: ICD-10-CM

## 2024-09-05 DIAGNOSIS — E03.9 HYPOTHYROIDISM, UNSPECIFIED TYPE: ICD-10-CM

## 2024-09-05 DIAGNOSIS — C18.9 MALIGNANT NEOPLASM OF COLON, UNSPECIFIED PART OF COLON (HCC): ICD-10-CM

## 2024-09-05 DIAGNOSIS — I25.10 CAD IN NATIVE ARTERY: ICD-10-CM

## 2024-09-05 DIAGNOSIS — I10 PRIMARY HYPERTENSION: Primary | ICD-10-CM

## 2024-09-05 PROCEDURE — G2211 COMPLEX E/M VISIT ADD ON: HCPCS | Performed by: FAMILY MEDICINE

## 2024-09-05 PROCEDURE — 3078F DIAST BP <80 MM HG: CPT | Performed by: FAMILY MEDICINE

## 2024-09-05 PROCEDURE — 3074F SYST BP LT 130 MM HG: CPT | Performed by: FAMILY MEDICINE

## 2024-09-05 PROCEDURE — 99214 OFFICE O/P EST MOD 30 MIN: CPT | Performed by: FAMILY MEDICINE

## 2024-09-05 PROCEDURE — 1124F ACP DISCUSS-NO DSCNMKR DOCD: CPT | Performed by: FAMILY MEDICINE

## 2024-09-05 RX ORDER — SPIRONOLACTONE 25 MG/1
25 TABLET ORAL DAILY
Qty: 90 TABLET | Refills: 1 | Status: SHIPPED | OUTPATIENT
Start: 2024-09-05

## 2024-09-05 RX ORDER — LEVOTHYROXINE SODIUM 50 UG/1
50 TABLET ORAL DAILY
Qty: 90 TABLET | Refills: 1 | Status: SHIPPED | OUTPATIENT
Start: 2024-09-05

## 2024-09-05 RX ORDER — ROSUVASTATIN CALCIUM 20 MG/1
20 TABLET, COATED ORAL DAILY
Qty: 90 TABLET | Refills: 1 | Status: SHIPPED | OUTPATIENT
Start: 2024-09-05

## 2024-09-12 DIAGNOSIS — C18.2 MALIGNANT NEOPLASM OF ASCENDING COLON (HCC): ICD-10-CM

## 2024-09-12 RX ORDER — CAPECITABINE 500 MG/1
2000 TABLET, FILM COATED ORAL 2 TIMES DAILY
Qty: 112 TABLET | Refills: 0 | Status: SHIPPED | OUTPATIENT
Start: 2024-09-12 | End: 2024-09-26

## 2024-09-12 RX ORDER — CAPECITABINE 500 MG/1
TABLET, FILM COATED ORAL
Qty: 112 TABLET | Refills: 0 | OUTPATIENT
Start: 2024-09-12

## 2024-09-17 ASSESSMENT — ENCOUNTER SYMPTOMS
RHINORRHEA: 0
GASTROINTESTINAL NEGATIVE: 1
PHOTOPHOBIA: 0
SHORTNESS OF BREATH: 0
EYE REDNESS: 0
COUGH: 0
SINUS PAIN: 0
EYE DISCHARGE: 0

## 2024-09-19 ENCOUNTER — OFFICE VISIT (OUTPATIENT)
Dept: ONCOLOGY | Age: 64
End: 2024-09-19
Payer: MEDICARE

## 2024-09-19 ENCOUNTER — HOSPITAL ENCOUNTER (OUTPATIENT)
Dept: INFUSION THERAPY | Age: 64
Discharge: HOME | End: 2024-09-19
Payer: MEDICARE

## 2024-09-19 VITALS
OXYGEN SATURATION: 97 % | WEIGHT: 202.2 LBS | SYSTOLIC BLOOD PRESSURE: 144 MMHG | HEART RATE: 62 BPM | BODY MASS INDEX: 39.7 KG/M2 | DIASTOLIC BLOOD PRESSURE: 81 MMHG | HEIGHT: 60 IN | TEMPERATURE: 97.6 F

## 2024-09-19 DIAGNOSIS — C18.2 MALIGNANT NEOPLASM OF ASCENDING COLON (HCC): Primary | ICD-10-CM

## 2024-09-19 DIAGNOSIS — C18.2 MALIGNANT NEOPLASM OF ASCENDING COLON (HCC): ICD-10-CM

## 2024-09-19 LAB
ALBUMIN SERPL-MCNC: 3.7 G/DL (ref 3.5–5.2)
ALP SERPL-CCNC: 100 U/L (ref 35–104)
ALT SERPL-CCNC: 21 U/L (ref 0–32)
ANION GAP SERPL CALCULATED.3IONS-SCNC: 7 MMOL/L (ref 7–16)
AST SERPL-CCNC: 18 U/L (ref 0–31)
BASOPHILS # BLD: 0.03 K/UL (ref 0–0.2)
BASOPHILS NFR BLD: 0 % (ref 0–2)
BILIRUB SERPL-MCNC: 0.4 MG/DL (ref 0–1.2)
BUN SERPL-MCNC: 31 MG/DL (ref 6–23)
CALCIUM SERPL-MCNC: 9.3 MG/DL (ref 8.6–10.2)
CEA SERPL-MCNC: 5.9 NG/ML (ref 0–5.2)
CHLORIDE SERPL-SCNC: 100 MMOL/L (ref 98–107)
CO2 SERPL-SCNC: 27 MMOL/L (ref 22–29)
CREAT SERPL-MCNC: 1.1 MG/DL (ref 0.5–1)
EOSINOPHIL # BLD: 0.08 K/UL (ref 0.05–0.5)
EOSINOPHILS RELATIVE PERCENT: 1 % (ref 0–6)
ERYTHROCYTE [DISTWIDTH] IN BLOOD BY AUTOMATED COUNT: 15 % (ref 11.5–15)
GFR, ESTIMATED: 59 ML/MIN/1.73M2
GLUCOSE SERPL-MCNC: 111 MG/DL (ref 74–99)
HCT VFR BLD AUTO: 43 % (ref 34–48)
HGB BLD-MCNC: 14 G/DL (ref 11.5–15.5)
IMM GRANULOCYTES # BLD AUTO: <0.03 K/UL (ref 0–0.58)
IMM GRANULOCYTES NFR BLD: 0 % (ref 0–5)
LYMPHOCYTES NFR BLD: 2.26 K/UL (ref 1.5–4)
LYMPHOCYTES RELATIVE PERCENT: 33 % (ref 20–42)
MCH RBC QN AUTO: 31.6 PG (ref 26–35)
MCHC RBC AUTO-ENTMCNC: 32.6 G/DL (ref 32–34.5)
MCV RBC AUTO: 97.1 FL (ref 80–99.9)
MONOCYTES NFR BLD: 0.75 K/UL (ref 0.1–0.95)
MONOCYTES NFR BLD: 11 % (ref 2–12)
NEUTROPHILS NFR BLD: 55 % (ref 43–80)
NEUTS SEG NFR BLD: 3.79 K/UL (ref 1.8–7.3)
PLATELET # BLD AUTO: 281 K/UL (ref 130–450)
PMV BLD AUTO: 11.1 FL (ref 7–12)
POTASSIUM SERPL-SCNC: 4.2 MMOL/L (ref 3.5–5)
PROT SERPL-MCNC: 9.1 G/DL (ref 6.4–8.3)
RBC # BLD AUTO: 4.43 M/UL (ref 3.5–5.5)
SODIUM SERPL-SCNC: 134 MMOL/L (ref 132–146)
WBC OTHER # BLD: 6.9 K/UL (ref 4.5–11.5)

## 2024-09-19 PROCEDURE — 36415 COLL VENOUS BLD VENIPUNCTURE: CPT

## 2024-09-19 PROCEDURE — 99213 OFFICE O/P EST LOW 20 MIN: CPT

## 2024-09-19 PROCEDURE — 80053 COMPREHEN METABOLIC PANEL: CPT

## 2024-09-19 PROCEDURE — 85025 COMPLETE CBC W/AUTO DIFF WBC: CPT

## 2024-09-19 PROCEDURE — 82378 CARCINOEMBRYONIC ANTIGEN: CPT

## 2024-10-09 RX ORDER — METOPROLOL SUCCINATE 25 MG/1
25 TABLET, EXTENDED RELEASE ORAL DAILY
Qty: 90 TABLET | Refills: 3 | OUTPATIENT
Start: 2024-10-09

## 2024-10-30 ENCOUNTER — HOSPITAL ENCOUNTER (OUTPATIENT)
Dept: CT IMAGING | Age: 64
Discharge: HOME | End: 2024-10-30
Attending: INTERNAL MEDICINE
Payer: MEDICARE

## 2024-10-30 ENCOUNTER — HOSPITAL ENCOUNTER (OUTPATIENT)
Age: 64
End: 2024-10-30
Payer: MEDICARE

## 2024-10-30 ENCOUNTER — HOSPITAL ENCOUNTER (OUTPATIENT)
Age: 64
Discharge: HOME | End: 2024-10-30
Payer: MEDICARE

## 2024-10-30 DIAGNOSIS — C18.2 MALIGNANT NEOPLASM OF ASCENDING COLON (HCC): ICD-10-CM

## 2024-10-30 LAB
ALBUMIN SERPL-MCNC: 3.9 G/DL (ref 3.5–5.2)
ALP SERPL-CCNC: 107 U/L (ref 35–104)
ALT SERPL-CCNC: 19 U/L (ref 0–32)
ANION GAP SERPL CALCULATED.3IONS-SCNC: 11 MMOL/L (ref 7–16)
AST SERPL-CCNC: 14 U/L (ref 0–31)
BASOPHILS # BLD: 0.03 K/UL (ref 0–0.2)
BASOPHILS NFR BLD: 0 % (ref 0–2)
BILIRUB SERPL-MCNC: 0.5 MG/DL (ref 0–1.2)
BUN SERPL-MCNC: 13 MG/DL (ref 6–23)
CALCIUM SERPL-MCNC: 9.5 MG/DL (ref 8.6–10.2)
CEA SERPL-MCNC: 3.4 NG/ML (ref 0–5.2)
CHLORIDE SERPL-SCNC: 99 MMOL/L (ref 98–107)
CO2 SERPL-SCNC: 24 MMOL/L (ref 22–29)
CREAT SERPL-MCNC: 0.9 MG/DL (ref 0.5–1)
EOSINOPHIL # BLD: 0.09 K/UL (ref 0.05–0.5)
EOSINOPHILS RELATIVE PERCENT: 1 % (ref 0–6)
ERYTHROCYTE [DISTWIDTH] IN BLOOD BY AUTOMATED COUNT: 14.7 % (ref 11.5–15)
GFR, ESTIMATED: 73 ML/MIN/1.73M2
GLUCOSE SERPL-MCNC: 117 MG/DL (ref 74–99)
HCT VFR BLD AUTO: 45.3 % (ref 34–48)
HGB BLD-MCNC: 14.8 G/DL (ref 11.5–15.5)
IMM GRANULOCYTES # BLD AUTO: <0.03 K/UL (ref 0–0.58)
IMM GRANULOCYTES NFR BLD: 0 % (ref 0–5)
LYMPHOCYTES NFR BLD: 2.14 K/UL (ref 1.5–4)
LYMPHOCYTES RELATIVE PERCENT: 29 % (ref 20–42)
MCH RBC QN AUTO: 30.9 PG (ref 26–35)
MCHC RBC AUTO-ENTMCNC: 32.7 G/DL (ref 32–34.5)
MCV RBC AUTO: 94.6 FL (ref 80–99.9)
MONOCYTES NFR BLD: 0.6 K/UL (ref 0.1–0.95)
MONOCYTES NFR BLD: 8 % (ref 2–12)
NEUTROPHILS NFR BLD: 61 % (ref 43–80)
NEUTS SEG NFR BLD: 4.53 K/UL (ref 1.8–7.3)
PLATELET # BLD AUTO: 286 K/UL (ref 130–450)
PMV BLD AUTO: 11.9 FL (ref 7–12)
POTASSIUM SERPL-SCNC: 4.2 MMOL/L (ref 3.5–5)
PROT SERPL-MCNC: 9.7 G/DL (ref 6.4–8.3)
RBC # BLD AUTO: 4.79 M/UL (ref 3.5–5.5)
SODIUM SERPL-SCNC: 134 MMOL/L (ref 132–146)
WBC OTHER # BLD: 7.4 K/UL (ref 4.5–11.5)

## 2024-10-30 PROCEDURE — 84165 PROTEIN E-PHORESIS SERUM: CPT

## 2024-10-30 PROCEDURE — 80053 COMPREHEN METABOLIC PANEL: CPT

## 2024-10-30 PROCEDURE — 36415 COLL VENOUS BLD VENIPUNCTURE: CPT

## 2024-10-30 PROCEDURE — 74177 CT ABD & PELVIS W/CONTRAST: CPT

## 2024-10-30 PROCEDURE — 84155 ASSAY OF PROTEIN SERUM: CPT

## 2024-10-30 PROCEDURE — 6360000004 HC RX CONTRAST MEDICATION: Performed by: RADIOLOGY

## 2024-10-30 PROCEDURE — 85025 COMPLETE CBC W/AUTO DIFF WBC: CPT

## 2024-10-30 PROCEDURE — 86334 IMMUNOFIX E-PHORESIS SERUM: CPT

## 2024-10-30 PROCEDURE — 71260 CT THORAX DX C+: CPT

## 2024-10-30 PROCEDURE — 83521 IG LIGHT CHAINS FREE EACH: CPT

## 2024-10-30 PROCEDURE — 82378 CARCINOEMBRYONIC ANTIGEN: CPT

## 2024-10-30 RX ORDER — IOPAMIDOL 755 MG/ML
100 INJECTION, SOLUTION INTRAVASCULAR
Status: COMPLETED | OUTPATIENT
Start: 2024-10-30 | End: 2024-10-30

## 2024-10-30 RX ADMIN — IOPAMIDOL 100 ML: 755 INJECTION, SOLUTION INTRAVENOUS at 10:25

## 2024-10-31 ENCOUNTER — HOSPITAL ENCOUNTER (OUTPATIENT)
Dept: INFUSION THERAPY | Age: 64
Discharge: HOME | End: 2024-10-31

## 2024-10-31 ENCOUNTER — OFFICE VISIT (OUTPATIENT)
Dept: ONCOLOGY | Age: 64
End: 2024-10-31
Payer: MEDICARE

## 2024-10-31 VITALS
WEIGHT: 205.8 LBS | OXYGEN SATURATION: 93 % | HEIGHT: 60 IN | TEMPERATURE: 97.8 F | BODY MASS INDEX: 40.4 KG/M2 | SYSTOLIC BLOOD PRESSURE: 110 MMHG | DIASTOLIC BLOOD PRESSURE: 102 MMHG | HEART RATE: 70 BPM

## 2024-10-31 DIAGNOSIS — C18.2 MALIGNANT NEOPLASM OF ASCENDING COLON (HCC): Primary | ICD-10-CM

## 2024-10-31 DIAGNOSIS — C90.00 MULTIPLE MYELOMA, REMISSION STATUS UNSPECIFIED (HCC): ICD-10-CM

## 2024-10-31 PROCEDURE — 99212 OFFICE O/P EST SF 10 MIN: CPT

## 2024-10-31 NOTE — PROGRESS NOTES
[facility]  [address] [place] [address] [place] [address]   Hematology/Oncology  Consult      Patient Name: Vivi Heaton  YOB: 1960  PCP: Jonas Luz DO   Referring Provider: [address] [place] [address] [place]. / [place] [address]     Reason for Consultation:   Chief Complaint   Patient presents with    Follow-up     Malignant neoplasm of ascending colon       Interval history: No new complaints.     History of Present Illness:  63 years old female referred by Dr. Bradford, for abnormal SPEP.  She was admitted in August 2022 with congestive heart failure and NSTEMI.  Found to have coronary artery disease, EF of 10%, left atrial thrombus.  On GDMT and Eliquis and LifeVest.  Serum immunofixation showed monoclonal IgG G kappa.  M spike 2.4.  Serum free kappa was 22, lambda 14 with a normal ratio of 1.5.  Random UPEP also showed monoclonal IgG kappa.  Chemistry from 2 weeks ago show creatinine of 1.2, calcium 9.4.  No recent hemoglobin available.  Patient denies any bony pain.  Cardiac MRI has been ordered to rule out cardiac amyloidosis.    S/p bone marrow biopsy February 2023. Pathology showed 30% kappa restricted plasma cells, normal female karyotype.  FISH showed 13 monosomy as well as IgH abnormality.  IgH rearrangement analysis could not be done due to limited specimen.    Hemoglobin creatinine calcium normal.  No bony pain.    Skeletal survey survey showed no lytic lesions.  Left sacroiliac sclerotic lesion was noted.  CT pelvis did not show any bone lesion      Found to have polyps on a screening colonoscopy by Dr. Hogan in January 2024.  Biopsy came back positive for adenocarcinoma.  Patient s/p right hemicolectomy at [facility] on February 28, 2024.  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  Lymphovascular invasion and perineural invasion were noted.  CT chest 7 pelvis

## 2024-11-01 LAB
ALBUMIN SERPL-MCNC: 3.7 G/DL (ref 3.5–4.7)
ALPHA1 GLOB SERPL ELPH-MCNC: 0.3 G/DL (ref 0.2–0.4)
ALPHA2 GLOB SERPL ELPH-MCNC: 0.9 G/DL (ref 0.5–1)
B-GLOBULIN SERPL ELPH-MCNC: 1.4 G/DL (ref 0.8–1.3)
FREE KAPPA/LAMBDA RATIO: 3.44 (ref 0.22–1.74)
GAMMA GLOB SERPL ELPH-MCNC: 2.9 G/DL (ref 0.7–1.6)
INTERPRETATION SERPL IFE-IMP: NORMAL
KAPPA LC FREE SER-MCNC: 58.5 MG/L
LAMBDA LC FREE SERPL-MCNC: 17 MG/L (ref 4.2–27.7)
M PROTEIN SERPL ELPH-MCNC: 2.6 G/DL
PATH REV: NORMAL
PATHOLOGIST: ABNORMAL
PROT PATTERN SERPL ELPH-IMP: ABNORMAL
PROT SERPL-MCNC: 9.3 G/DL (ref 6.4–8.3)

## 2024-11-15 ENCOUNTER — HOSPITAL ENCOUNTER (OUTPATIENT)
Dept: OTHER | Age: 64
Setting detail: THERAPIES SERIES
Discharge: HOME | End: 2024-11-15
Payer: MEDICARE

## 2024-11-15 VITALS
HEART RATE: 88 BPM | RESPIRATION RATE: 18 BRPM | SYSTOLIC BLOOD PRESSURE: 133 MMHG | OXYGEN SATURATION: 100 % | DIASTOLIC BLOOD PRESSURE: 72 MMHG | BODY MASS INDEX: 40.82 KG/M2 | WEIGHT: 209 LBS

## 2024-11-15 LAB
ANION GAP SERPL CALCULATED.3IONS-SCNC: 9 MMOL/L (ref 7–16)
BNP SERPL-MCNC: <36 PG/ML (ref 0–450)
BUN SERPL-MCNC: 21 MG/DL (ref 6–23)
CALCIUM SERPL-MCNC: 8.7 MG/DL (ref 8.6–10.2)
CHLORIDE SERPL-SCNC: 103 MMOL/L (ref 98–107)
CO2 SERPL-SCNC: 22 MMOL/L (ref 22–29)
CREAT SERPL-MCNC: 1 MG/DL (ref 0.5–1)
GFR, ESTIMATED: 63 ML/MIN/1.73M2
GLUCOSE SERPL-MCNC: 124 MG/DL (ref 74–99)
POTASSIUM SERPL-SCNC: 4 MMOL/L (ref 3.5–5)
SODIUM SERPL-SCNC: 134 MMOL/L (ref 132–146)

## 2024-11-15 PROCEDURE — 83880 ASSAY OF NATRIURETIC PEPTIDE: CPT

## 2024-11-15 PROCEDURE — 99214 OFFICE O/P EST MOD 30 MIN: CPT

## 2024-11-15 PROCEDURE — 36415 COLL VENOUS BLD VENIPUNCTURE: CPT

## 2024-11-15 PROCEDURE — 80048 BASIC METABOLIC PNL TOTAL CA: CPT

## 2024-11-15 ASSESSMENT — PATIENT HEALTH QUESTIONNAIRE - PHQ9
1. LITTLE INTEREST OR PLEASURE IN DOING THINGS: NOT AT ALL
SUM OF ALL RESPONSES TO PHQ QUESTIONS 1-9: 0
SUM OF ALL RESPONSES TO PHQ9 QUESTIONS 1 & 2: 0
SUM OF ALL RESPONSES TO PHQ QUESTIONS 1-9: 0
SUM OF ALL RESPONSES TO PHQ QUESTIONS 1-9: 0
2. FEELING DOWN, DEPRESSED OR HOPELESS: NOT AT ALL
SUM OF ALL RESPONSES TO PHQ QUESTIONS 1-9: 0

## 2024-12-23 ENCOUNTER — OFFICE VISIT (OUTPATIENT)
Dept: CARDIOLOGY CLINIC | Age: 64
End: 2024-12-23
Payer: MEDICARE

## 2024-12-23 VITALS
HEART RATE: 64 BPM | HEIGHT: 60 IN | RESPIRATION RATE: 16 BRPM | DIASTOLIC BLOOD PRESSURE: 72 MMHG | WEIGHT: 206 LBS | BODY MASS INDEX: 40.44 KG/M2 | SYSTOLIC BLOOD PRESSURE: 124 MMHG

## 2024-12-23 DIAGNOSIS — I42.8 NICM (NONISCHEMIC CARDIOMYOPATHY) (HCC): Primary | ICD-10-CM

## 2024-12-23 PROCEDURE — 1159F MED LIST DOCD IN RCRD: CPT | Performed by: INTERNAL MEDICINE

## 2024-12-23 PROCEDURE — 99214 OFFICE O/P EST MOD 30 MIN: CPT | Performed by: INTERNAL MEDICINE

## 2024-12-23 PROCEDURE — 1124F ACP DISCUSS-NO DSCNMKR DOCD: CPT | Performed by: INTERNAL MEDICINE

## 2024-12-23 PROCEDURE — 3078F DIAST BP <80 MM HG: CPT | Performed by: INTERNAL MEDICINE

## 2024-12-23 PROCEDURE — 3074F SYST BP LT 130 MM HG: CPT | Performed by: INTERNAL MEDICINE

## 2024-12-23 PROCEDURE — 93000 ELECTROCARDIOGRAM COMPLETE: CPT | Performed by: INTERNAL MEDICINE

## 2024-12-23 NOTE — PROGRESS NOTES
DAILY AS NEEDED (WEIGHT GAIN, SHORTNESS OF BREATH, SWELLING) 90 tablet 2    sacubitril-valsartan (ENTRESTO)  MG per tablet Take 1 tablet by mouth 2 times daily 180 tablet 3    metoprolol succinate (TOPROL XL) 25 MG extended release tablet TAKE 1 TABLET BY MOUTH EVERY DAY 90 tablet 3           Guideline directed medical/device therapy:  ARNI/ACE I/ARB: Yes  Beta blocker:  Yes  Aldosterone antagonist:  Yes  SGLT2i: Yes  ICD/CRT-P/-D:   LiveVest  QRS interval on recent ECG (personally reviewed/interpreted): <120 ms  Percentage RV pacing (personally reviewed/interpreted): %/NA        Review of Systems:   Cardiac: As per HPI  General: No fever, chills, rigors  Pulmonary: As per HPI  HEENT: No visual disturbances, difficult swallowing  GI: No nausea, vomiting, abdominal pain  : No dysuria or hematuria  Endocrine: No thyroid disease or diabetes  Musculoskeletal: MAGUIRE x 4, no focal motor deficits  Skin: Intact, no rashes  Neuro/Psych: No headache or seizures          Weights:  Wt Readings from Last 3 Encounters:   12/23/24 93.4 kg (206 lb)   11/15/24 94.8 kg (209 lb)   10/31/24 93.4 kg (205 lb 12.8 oz)             Physical Examination:     /72   Pulse 64   Resp 16   Ht 1.524 m (5')   Wt 93.4 kg (206 lb)   BMI 40.23 kg/m²     CONSTITUTIONAL: Alert and oriented times 3, no acute distress and cooperative to examination with proper mood and affect.  SKIN: Skin color, texture, turgor normal. No rashes or lesions.  LYMPH: no cervical nodes, no inguinal nodes  HEENT: Head is normocephalic, atraumatic. EOMI, PERRLA.  NECK: Supple, symmetrical, trachea midline, no adenopathy, thyroid symmetric, not enlarged and no tenderness, skin normal.  CHEST/LUNGS: chest symmetric with normal A/P diameter, normal respiratory rate and rhythm, lungs clear to auscultation without wheezes, rales or rhonchi. No accessory muscle use. Scars None   CARDIOVASCULAR: Heart sounds are normal.  Regular rate and rhythm without murmur, gallop 
2

## 2025-01-30 ENCOUNTER — OFFICE VISIT (OUTPATIENT)
Dept: ONCOLOGY | Age: 65
End: 2025-01-30
Payer: MEDICARE

## 2025-01-30 ENCOUNTER — HOSPITAL ENCOUNTER (OUTPATIENT)
Dept: INFUSION THERAPY | Age: 65
Discharge: HOME | End: 2025-01-30
Payer: MEDICARE

## 2025-01-30 VITALS
BODY MASS INDEX: 41.19 KG/M2 | HEIGHT: 60 IN | OXYGEN SATURATION: 98 % | DIASTOLIC BLOOD PRESSURE: 73 MMHG | SYSTOLIC BLOOD PRESSURE: 122 MMHG | WEIGHT: 209.8 LBS | TEMPERATURE: 97 F | HEART RATE: 73 BPM

## 2025-01-30 DIAGNOSIS — C18.2 MALIGNANT NEOPLASM OF ASCENDING COLON (HCC): ICD-10-CM

## 2025-01-30 DIAGNOSIS — C18.2 MALIGNANT NEOPLASM OF ASCENDING COLON (HCC): Primary | ICD-10-CM

## 2025-01-30 LAB
ALBUMIN SERPL-MCNC: 3.6 G/DL (ref 3.5–5.2)
ALP SERPL-CCNC: 101 U/L (ref 35–104)
ALT SERPL-CCNC: 12 U/L (ref 0–32)
ANION GAP SERPL CALCULATED.3IONS-SCNC: 9 MMOL/L (ref 7–16)
AST SERPL-CCNC: 12 U/L (ref 0–31)
BASOPHILS # BLD: 0.03 K/UL (ref 0–0.2)
BASOPHILS NFR BLD: 1 % (ref 0–2)
BILIRUB SERPL-MCNC: 0.5 MG/DL (ref 0–1.2)
BUN SERPL-MCNC: 14 MG/DL (ref 6–23)
CALCIUM SERPL-MCNC: 9.4 MG/DL (ref 8.6–10.2)
CEA SERPL-MCNC: 3.3 NG/ML (ref 0–5.2)
CHLORIDE SERPL-SCNC: 103 MMOL/L (ref 98–107)
CO2 SERPL-SCNC: 24 MMOL/L (ref 22–29)
CREAT SERPL-MCNC: 0.9 MG/DL (ref 0.5–1)
EOSINOPHIL # BLD: 0.1 K/UL (ref 0.05–0.5)
EOSINOPHILS RELATIVE PERCENT: 2 % (ref 0–6)
ERYTHROCYTE [DISTWIDTH] IN BLOOD BY AUTOMATED COUNT: 15.3 % (ref 11.5–15)
GFR, ESTIMATED: 70 ML/MIN/1.73M2
GLUCOSE SERPL-MCNC: 135 MG/DL (ref 74–99)
HCT VFR BLD AUTO: 45.6 % (ref 34–48)
HGB BLD-MCNC: 14.7 G/DL (ref 11.5–15.5)
IMM GRANULOCYTES # BLD AUTO: <0.03 K/UL (ref 0–0.58)
IMM GRANULOCYTES NFR BLD: 0 % (ref 0–5)
LYMPHOCYTES NFR BLD: 1.67 K/UL (ref 1.5–4)
LYMPHOCYTES RELATIVE PERCENT: 25 % (ref 20–42)
MCH RBC QN AUTO: 28.8 PG (ref 26–35)
MCHC RBC AUTO-ENTMCNC: 32.2 G/DL (ref 32–34.5)
MCV RBC AUTO: 89.4 FL (ref 80–99.9)
MONOCYTES NFR BLD: 0.76 K/UL (ref 0.1–0.95)
MONOCYTES NFR BLD: 12 % (ref 2–12)
NEUTROPHILS NFR BLD: 61 % (ref 43–80)
NEUTS SEG NFR BLD: 4.06 K/UL (ref 1.8–7.3)
PLATELET # BLD AUTO: 283 K/UL (ref 130–450)
PMV BLD AUTO: 11.6 FL (ref 7–12)
POTASSIUM SERPL-SCNC: 4.1 MMOL/L (ref 3.5–5)
PROT SERPL-MCNC: 9.3 G/DL (ref 6.4–8.3)
RBC # BLD AUTO: 5.1 M/UL (ref 3.5–5.5)
SODIUM SERPL-SCNC: 136 MMOL/L (ref 132–146)
WBC OTHER # BLD: 6.6 K/UL (ref 4.5–11.5)

## 2025-01-30 PROCEDURE — 36415 COLL VENOUS BLD VENIPUNCTURE: CPT

## 2025-01-30 PROCEDURE — 86334 IMMUNOFIX E-PHORESIS SERUM: CPT

## 2025-01-30 PROCEDURE — 99213 OFFICE O/P EST LOW 20 MIN: CPT

## 2025-01-30 PROCEDURE — 84165 PROTEIN E-PHORESIS SERUM: CPT

## 2025-01-30 PROCEDURE — 82378 CARCINOEMBRYONIC ANTIGEN: CPT

## 2025-01-30 PROCEDURE — 83521 IG LIGHT CHAINS FREE EACH: CPT

## 2025-01-30 PROCEDURE — 80053 COMPREHEN METABOLIC PANEL: CPT

## 2025-01-30 PROCEDURE — 84155 ASSAY OF PROTEIN SERUM: CPT

## 2025-01-30 PROCEDURE — 85025 COMPLETE CBC W/AUTO DIFF WBC: CPT

## 2025-01-30 NOTE — PROGRESS NOTES
[facility]  [address] [place] [address]   Hematology/Oncology  Consult      Patient Name: Vivi Heaton  YOB: 1960  PCP: Jonas Luz DO   Referring Provider:      Reason for Consultation:   Chief Complaint   Patient presents with    Follow-up     Malignant neoplasm of ascending colon (HCC)          Interval history: No new complaints.     History of Present Illness:  64 years old female referred by Dr. Bradford, for abnormal SPEP.  She was admitted in August 2022 with congestive heart failure and NSTEMI.  Found to have coronary artery disease, EF of 10%, left atrial thrombus.  On GDMT and Eliquis and LifeVest.  Serum immunofixation showed monoclonal IgG G kappa.  M spike 2.4.  Serum free kappa was 22, lambda 14 with a normal ratio of 1.5.  Random UPEP also showed monoclonal IgG kappa.  Chemistry from 2 weeks ago show creatinine of 1.2, calcium 9.4.  No recent hemoglobin available.  Patient denies any bony pain.  Cardiac MRI has been ordered to rule out cardiac amyloidosis.    S/p bone marrow biopsy February 2023. Pathology showed 30% kappa restricted plasma cells, normal female karyotype.  FISH showed 13 monosomy as well as IgH abnormality.  IgH rearrangement analysis could not be done due to limited specimen.    Hemoglobin creatinine calcium normal.  No bony pain.    Skeletal survey survey showed no lytic lesions.  Left sacroiliac sclerotic lesion was noted.  CT pelvis did not show any bone lesion      Found to have polyps on a screening colonoscopy by Dr. Hogan in January 2024.  Biopsy came back positive for adenocarcinoma.  Patient s/p right hemicolectomy at [facility] on February 28, 2024.  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  Lymphovascular invasion and perineural invasion were noted.  CT chest 7 pelvis were negative for

## 2025-01-31 LAB
ALBUMIN SERPL-MCNC: 3.4 G/DL (ref 3.5–4.7)
ALPHA1 GLOB SERPL ELPH-MCNC: 0.3 G/DL (ref 0.2–0.4)
ALPHA2 GLOB SERPL ELPH-MCNC: 0.9 G/DL (ref 0.5–1)
B-GLOBULIN SERPL ELPH-MCNC: 1.4 G/DL (ref 0.8–1.3)
GAMMA GLOB SERPL ELPH-MCNC: 3 G/DL (ref 0.7–1.6)
INTERPRETATION SERPL IFE-IMP: NORMAL
M PROTEIN SERPL ELPH-MCNC: 2.6 G/DL
PATH REV: NORMAL
PATHOLOGIST: ABNORMAL
PROT PATTERN SERPL ELPH-IMP: ABNORMAL
PROT SERPL-MCNC: 8.9 G/DL (ref 6.4–8.3)

## 2025-02-01 LAB
FREE KAPPA/LAMBDA RATIO: 2.65 (ref 0.22–1.74)
KAPPA LC FREE SER-MCNC: 57.8 MG/L
LAMBDA LC FREE SERPL-MCNC: 21.8 MG/L (ref 4.2–27.7)

## 2025-02-03 ENCOUNTER — TELEPHONE (OUTPATIENT)
Dept: FAMILY MEDICINE CLINIC | Age: 65
End: 2025-02-03

## 2025-02-03 NOTE — TELEPHONE ENCOUNTER
Patient called stating her medication has been recalled and she is worried about taking it and wants something else called in to substitute for it? Or what's going on? What should she do she is wondering?    Levothyroxine is the medication she is talking about    Please call her back ASAP to let her know what we are doing about this?    [phone]

## 2025-02-03 NOTE — TELEPHONE ENCOUNTER
Have the patient call her pharmacy and see if the medication that she received that him is under this recall and if she needs a new prescription.

## 2025-03-04 RX ORDER — METOPROLOL SUCCINATE 25 MG/1
25 TABLET, EXTENDED RELEASE ORAL DAILY
Qty: 90 TABLET | Refills: 3 | OUTPATIENT
Start: 2025-03-04

## 2025-03-05 RX ORDER — METOPROLOL SUCCINATE 25 MG/1
25 TABLET, EXTENDED RELEASE ORAL DAILY
Qty: 90 TABLET | Refills: 1 | Status: SHIPPED | OUTPATIENT
Start: 2025-03-05

## 2025-03-05 NOTE — TELEPHONE ENCOUNTER
.Name of Medication(s) Requested:  Requested Prescriptions     Pending Prescriptions Disp Refills    metoprolol succinate (TOPROL XL) 25 MG extended release tablet 90 tablet 3     Sig: Take 1 tablet by mouth daily       Medication is on current medication list Yes    Dosage and directions were verified? Yes    Quantity verified: 90 day supply     Pharmacy Verified?  Yes    Last Appointment:  9/5/2024    Future appts:  Future Appointments   Date Time Provider Department Center   3/21/2025  9:00 AM [facility] CHF ROOM 1 [facility] CHF [place]   5/1/2025 12:00 PM [facility] CT RM 3 [facility] CT [facility] [place]   5/8/2025 10:00 AM [facility] LABS ROOM MEDICAL ONCOLOGY [facility] MED ONC [place]   5/8/2025 10:45 AM Fabian Welch MD Blood Cancer [facility]        (If no appt send self scheduling link. .REFILLAPPT)  Scheduling request sent?     [] Yes  [x] No    Does patient need updated?  [] Yes  [x] No     [facility]

## 2025-03-13 ENCOUNTER — OFFICE VISIT (OUTPATIENT)
Dept: FAMILY MEDICINE CLINIC | Age: 65
End: 2025-03-13

## 2025-03-13 VITALS
SYSTOLIC BLOOD PRESSURE: 128 MMHG | RESPIRATION RATE: 18 BRPM | DIASTOLIC BLOOD PRESSURE: 74 MMHG | HEIGHT: 60 IN | OXYGEN SATURATION: 98 % | HEART RATE: 74 BPM | WEIGHT: 210 LBS | BODY MASS INDEX: 41.23 KG/M2

## 2025-03-13 DIAGNOSIS — E11.59 TYPE 2 DIABETES MELLITUS WITH OTHER CIRCULATORY COMPLICATION, WITHOUT LONG-TERM CURRENT USE OF INSULIN: ICD-10-CM

## 2025-03-13 DIAGNOSIS — R73.9 HYPERGLYCEMIA: ICD-10-CM

## 2025-03-13 DIAGNOSIS — E78.5 HYPERLIPIDEMIA, UNSPECIFIED HYPERLIPIDEMIA TYPE: ICD-10-CM

## 2025-03-13 DIAGNOSIS — Z00.00 INITIAL MEDICARE ANNUAL WELLNESS VISIT: Primary | ICD-10-CM

## 2025-03-13 DIAGNOSIS — C90.00 MULTIPLE MYELOMA, REMISSION STATUS UNSPECIFIED (HCC): ICD-10-CM

## 2025-03-13 DIAGNOSIS — I25.10 CAD IN NATIVE ARTERY: ICD-10-CM

## 2025-03-13 DIAGNOSIS — C18.9 MALIGNANT NEOPLASM OF COLON, UNSPECIFIED PART OF COLON (HCC): ICD-10-CM

## 2025-03-13 DIAGNOSIS — I50.20 HFREF (HEART FAILURE WITH REDUCED EJECTION FRACTION) (HCC): ICD-10-CM

## 2025-03-13 DIAGNOSIS — E03.9 HYPOTHYROIDISM, UNSPECIFIED TYPE: ICD-10-CM

## 2025-03-13 LAB — HBA1C MFR BLD: 7.2 %

## 2025-03-13 RX ORDER — SPIRONOLACTONE 25 MG/1
25 TABLET ORAL DAILY
Qty: 90 TABLET | Refills: 1 | Status: SHIPPED | OUTPATIENT
Start: 2025-03-13

## 2025-03-13 RX ORDER — ROSUVASTATIN CALCIUM 20 MG/1
20 TABLET, COATED ORAL DAILY
Qty: 90 TABLET | Refills: 1 | Status: SHIPPED | OUTPATIENT
Start: 2025-03-13

## 2025-03-13 RX ORDER — LEVOTHYROXINE SODIUM 50 UG/1
50 TABLET ORAL DAILY
Qty: 90 TABLET | Refills: 1 | Status: SHIPPED | OUTPATIENT
Start: 2025-03-13

## 2025-03-13 SDOH — ECONOMIC STABILITY: FOOD INSECURITY: WITHIN THE PAST 12 MONTHS, THE FOOD YOU BOUGHT JUST DIDN'T LAST AND YOU DIDN'T HAVE MONEY TO GET MORE.: NEVER TRUE

## 2025-03-13 SDOH — ECONOMIC STABILITY: FOOD INSECURITY: WITHIN THE PAST 12 MONTHS, YOU WORRIED THAT YOUR FOOD WOULD RUN OUT BEFORE YOU GOT MONEY TO BUY MORE.: NEVER TRUE

## 2025-03-13 ASSESSMENT — PATIENT HEALTH QUESTIONNAIRE - PHQ9
SUM OF ALL RESPONSES TO PHQ QUESTIONS 1-9: 0
SUM OF ALL RESPONSES TO PHQ QUESTIONS 1-9: 0
1. LITTLE INTEREST OR PLEASURE IN DOING THINGS: NOT AT ALL
SUM OF ALL RESPONSES TO PHQ QUESTIONS 1-9: 0
SUM OF ALL RESPONSES TO PHQ QUESTIONS 1-9: 0
2. FEELING DOWN, DEPRESSED OR HOPELESS: NOT AT ALL

## 2025-03-13 NOTE — PROGRESS NOTES
Attack Sister          at age 53 years, smoked    Arthritis Sister         smoked    Diabetes type 2  Brother         smoked    Diabetes type 2  Brother         smoked    Obesity Brother     Diabetes Brother         did not smoke       SOCIAL HISTORY  Social History     Socioeconomic History    Marital status: Legally      Spouse name: 2nd : Mr. 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 She had had her first cigarette at age 19 years, for 42 years she had averaged 1/4 PPD for 10.5 pack-years   Vaping Use    Vaping status: Never Used   Substance and Sexual Activity    Alcohol use: Not Currently     Comment: occasional beer.  Caffeine: rare and has decaf    Drug use: Never    Sexual activity: Defer     Comment: has no kids   Social History Narrative    CODE STATUS: Full Code    HEALTH CARE PROXY: her , Mr. Donnie Heaton, [phone]    AMBULATES: independently    DOMICILED: has stairs in the home, uses the stairs, lives with her  and her brother, has no home pets        No caffiene     Social Drivers of Health     Financial Resource Strain: Low Risk  (2024)    Overall Financial Resource Strain (CARDIA)     Difficulty of Paying Living Expenses: Not hard at all   Food Insecurity: No Food Insecurity (3/13/2025)    Hunger Vital Sign     Worried About Running Out of Food in the Last Year: Never true     Ran Out of Food in the Last Year: Never true

## 2025-03-13 NOTE — PATIENT INSTRUCTIONS
substance called plaque builds up in the vessels that supply oxygen-rich blood to your heart muscle. This can narrow the blood vessels and reduce blood flow. A heart attack happens when blood flow is completely blocked. A high-fat diet, smoking, and other factors increase the risk of heart disease.  Your doctor has found that you have a chance of having heart disease. A heart-healthy lifestyle can help keep your heart healthy and prevent heart disease. This lifestyle includes eating healthy, being active, staying at a weight that's healthy for you, and not smoking or using tobacco. It also includes taking medicines as directed, managing other health conditions, and trying to get a healthy amount of sleep.  Follow-up care is a key part of your treatment and safety. Be sure to make and go to all appointments, and call your doctor if you are having problems. It's also a good idea to know your test results and keep a list of the medicines you take.  How can you care for yourself at home?  Diet    Use less salt when you cook and eat. This helps lower your blood pressure. Taste food before salting. Add only a little salt when you think you need it. With time, your taste buds will adjust to less salt.     Eat fewer snack items, fast foods, canned soups, and other high-salt, high-fat, processed foods.     Read food labels and try to avoid saturated and trans fats. They increase your risk of heart disease by raising cholesterol levels.     Limit the amount of solid fat--butter, margarine, and shortening--you eat. Use olive, peanut, or canola oil when you cook. Bake, broil, and steam foods instead of frying them.     Eat a variety of fruit and vegetables every day. Dark green, deep orange, red, or yellow fruits and vegetables are especially good for you. Examples include spinach, carrots, peaches, and berries.     Foods high in fiber can reduce your cholesterol and provide important vitamins and minerals. High-fiber foods

## 2025-03-21 ENCOUNTER — TELEPHONE (OUTPATIENT)
Dept: OTHER | Age: 65
End: 2025-03-21

## 2025-03-21 NOTE — TELEPHONE ENCOUNTER
Patient called and LV to cancel today's CHF clinic appointment. Called patient back, left VM to return call to CHF clinic to be rescheduled.

## 2025-05-01 ENCOUNTER — HOSPITAL ENCOUNTER (OUTPATIENT)
Age: 65
Discharge: HOME | End: 2025-05-01
Attending: INTERNAL MEDICINE
Payer: MEDICARE

## 2025-05-01 ENCOUNTER — HOSPITAL ENCOUNTER (OUTPATIENT)
Dept: CT IMAGING | Age: 65
Discharge: HOME | End: 2025-05-01
Attending: INTERNAL MEDICINE
Payer: MEDICARE

## 2025-05-01 DIAGNOSIS — C18.2 MALIGNANT NEOPLASM OF ASCENDING COLON (HCC): ICD-10-CM

## 2025-05-01 LAB
ALBUMIN SERPL-MCNC: 3.7 G/DL (ref 3.5–5.2)
ALP SERPL-CCNC: 101 U/L (ref 35–104)
ALT SERPL-CCNC: 13 U/L (ref 0–32)
ANION GAP SERPL CALCULATED.3IONS-SCNC: 11 MMOL/L (ref 7–16)
AST SERPL-CCNC: 17 U/L (ref 0–31)
BILIRUB SERPL-MCNC: 0.5 MG/DL (ref 0–1.2)
BUN SERPL-MCNC: 13 MG/DL (ref 6–23)
CALCIUM SERPL-MCNC: 9.4 MG/DL (ref 8.6–10.2)
CEA SERPL-MCNC: 3.6 NG/ML (ref 0–5.2)
CHLORIDE SERPL-SCNC: 100 MMOL/L (ref 98–107)
CO2 SERPL-SCNC: 25 MMOL/L (ref 22–29)
CREAT SERPL-MCNC: 0.9 MG/DL (ref 0.5–1)
GFR, ESTIMATED: 76 ML/MIN/1.73M2
GLUCOSE SERPL-MCNC: 112 MG/DL (ref 74–99)
POTASSIUM SERPL-SCNC: 4.4 MMOL/L (ref 3.5–5)
PROT SERPL-MCNC: 9.6 G/DL (ref 6.4–8.3)
SODIUM SERPL-SCNC: 136 MMOL/L (ref 132–146)

## 2025-05-01 PROCEDURE — 74177 CT ABD & PELVIS W/CONTRAST: CPT

## 2025-05-01 PROCEDURE — 80053 COMPREHEN METABOLIC PANEL: CPT

## 2025-05-01 PROCEDURE — 84156 ASSAY OF PROTEIN URINE: CPT

## 2025-05-01 PROCEDURE — 82378 CARCINOEMBRYONIC ANTIGEN: CPT

## 2025-05-01 PROCEDURE — 36415 COLL VENOUS BLD VENIPUNCTURE: CPT

## 2025-05-01 PROCEDURE — 6360000004 HC RX CONTRAST MEDICATION: Performed by: RADIOLOGY

## 2025-05-01 PROCEDURE — 84166 PROTEIN E-PHORESIS/URINE/CSF: CPT

## 2025-05-01 PROCEDURE — 71260 CT THORAX DX C+: CPT

## 2025-05-01 RX ORDER — IOPAMIDOL 755 MG/ML
75 INJECTION, SOLUTION INTRAVASCULAR
Status: COMPLETED | OUTPATIENT
Start: 2025-05-01 | End: 2025-05-01

## 2025-05-01 RX ADMIN — IOPAMIDOL 70 ML: 755 INJECTION, SOLUTION INTRAVENOUS at 14:07

## 2025-05-02 LAB
P E INTERPRETATION, U: NORMAL
PATHOLOGIST: NORMAL
SPECIMEN TYPE: NORMAL

## 2025-05-03 ENCOUNTER — HOSPITAL ENCOUNTER (OUTPATIENT)
Age: 65
Discharge: HOME | End: 2025-05-03
Payer: MEDICARE

## 2025-05-03 LAB
BASOPHILS # BLD: 0.03 K/UL (ref 0–0.2)
BASOPHILS NFR BLD: 1 % (ref 0–2)
CEA SERPL-MCNC: 3.4 NG/ML (ref 0–5.2)
EOSINOPHIL # BLD: 0.1 K/UL (ref 0.05–0.5)
EOSINOPHILS RELATIVE PERCENT: 2 % (ref 0–6)
ERYTHROCYTE [DISTWIDTH] IN BLOOD BY AUTOMATED COUNT: 16.3 % (ref 11.5–15)
FREE KAPPA/LAMBDA RATIO: 3 (ref 0.22–1.74)
HCT VFR BLD AUTO: 42.9 % (ref 34–48)
HGB BLD-MCNC: 13.5 G/DL (ref 11.5–15.5)
IMM GRANULOCYTES # BLD AUTO: <0.03 K/UL (ref 0–0.58)
IMM GRANULOCYTES NFR BLD: 0 % (ref 0–5)
KAPPA LC FREE SER-MCNC: 58.5 MG/L
LAMBDA LC FREE SERPL-MCNC: 19.5 MG/L (ref 4.2–27.7)
LYMPHOCYTES NFR BLD: 2.36 K/UL (ref 1.5–4)
LYMPHOCYTES RELATIVE PERCENT: 40 % (ref 20–42)
MCH RBC QN AUTO: 28.8 PG (ref 26–35)
MCHC RBC AUTO-ENTMCNC: 31.5 G/DL (ref 32–34.5)
MCV RBC AUTO: 91.5 FL (ref 80–99.9)
MONOCYTES NFR BLD: 0.53 K/UL (ref 0.1–0.95)
MONOCYTES NFR BLD: 9 % (ref 2–12)
NEUTROPHILS NFR BLD: 49 % (ref 43–80)
NEUTS SEG NFR BLD: 2.89 K/UL (ref 1.8–7.3)
PLATELET # BLD AUTO: 301 K/UL (ref 130–450)
PMV BLD AUTO: 11.2 FL (ref 7–12)
RBC # BLD AUTO: 4.69 M/UL (ref 3.5–5.5)
WBC OTHER # BLD: 5.9 K/UL (ref 4.5–11.5)

## 2025-05-03 PROCEDURE — 84165 PROTEIN E-PHORESIS SERUM: CPT

## 2025-05-03 PROCEDURE — 36415 COLL VENOUS BLD VENIPUNCTURE: CPT

## 2025-05-03 PROCEDURE — 83521 IG LIGHT CHAINS FREE EACH: CPT

## 2025-05-03 PROCEDURE — 84155 ASSAY OF PROTEIN SERUM: CPT

## 2025-05-03 PROCEDURE — 82378 CARCINOEMBRYONIC ANTIGEN: CPT

## 2025-05-03 PROCEDURE — 85025 COMPLETE CBC W/AUTO DIFF WBC: CPT

## 2025-05-05 LAB
ALBUMIN SERPL-MCNC: 3.5 G/DL (ref 3.5–4.7)
ALPHA1 GLOB SERPL ELPH-MCNC: 0.3 G/DL (ref 0.2–0.4)
ALPHA2 GLOB SERPL ELPH-MCNC: 0.8 G/DL (ref 0.5–1)
B-GLOBULIN SERPL ELPH-MCNC: 1.3 G/DL (ref 0.8–1.3)
GAMMA GLOB SERPL ELPH-MCNC: 2.9 G/DL (ref 0.7–1.6)
INTERPRETATION SERPL IFE-IMP: NORMAL
M PROTEIN SERPL ELPH-MCNC: 2.6 G/DL
PATH REV: NORMAL
PATHOLOGIST: ABNORMAL
PROT PATTERN SERPL ELPH-IMP: ABNORMAL
PROT SERPL-MCNC: 8.8 G/DL (ref 6.4–8.3)

## 2025-05-06 ENCOUNTER — HOSPITAL ENCOUNTER (OUTPATIENT)
Dept: OTHER | Age: 65
Setting detail: THERAPIES SERIES
Discharge: HOME | End: 2025-05-06
Payer: MEDICARE

## 2025-05-06 VITALS
OXYGEN SATURATION: 94 % | HEART RATE: 83 BPM | SYSTOLIC BLOOD PRESSURE: 140 MMHG | WEIGHT: 214 LBS | BODY MASS INDEX: 41.79 KG/M2 | DIASTOLIC BLOOD PRESSURE: 69 MMHG | RESPIRATION RATE: 18 BRPM

## 2025-05-06 LAB
ANION GAP SERPL CALCULATED.3IONS-SCNC: 10 MMOL/L (ref 7–16)
BNP SERPL-MCNC: <36 PG/ML (ref 0–450)
BUN SERPL-MCNC: 18 MG/DL (ref 6–23)
CALCIUM SERPL-MCNC: 8.8 MG/DL (ref 8.6–10.2)
CHLORIDE SERPL-SCNC: 102 MMOL/L (ref 98–107)
CO2 SERPL-SCNC: 24 MMOL/L (ref 22–29)
CREAT SERPL-MCNC: 1 MG/DL (ref 0.5–1)
GFR, ESTIMATED: 64 ML/MIN/1.73M2
GLUCOSE SERPL-MCNC: 115 MG/DL (ref 74–99)
POTASSIUM SERPL-SCNC: 4.3 MMOL/L (ref 3.5–5)
SODIUM SERPL-SCNC: 136 MMOL/L (ref 132–146)

## 2025-05-06 PROCEDURE — 83880 ASSAY OF NATRIURETIC PEPTIDE: CPT

## 2025-05-06 PROCEDURE — 80048 BASIC METABOLIC PNL TOTAL CA: CPT

## 2025-05-06 PROCEDURE — 36415 COLL VENOUS BLD VENIPUNCTURE: CPT

## 2025-05-06 PROCEDURE — 99214 OFFICE O/P EST MOD 30 MIN: CPT

## 2025-05-06 ASSESSMENT — PATIENT HEALTH QUESTIONNAIRE - PHQ9
SUM OF ALL RESPONSES TO PHQ QUESTIONS 1-9: 2
SUM OF ALL RESPONSES TO PHQ QUESTIONS 1-9: 2
1. LITTLE INTEREST OR PLEASURE IN DOING THINGS: SEVERAL DAYS
SUM OF ALL RESPONSES TO PHQ QUESTIONS 1-9: 2
2. FEELING DOWN, DEPRESSED OR HOPELESS: SEVERAL DAYS
SUM OF ALL RESPONSES TO PHQ QUESTIONS 1-9: 2

## 2025-05-06 NOTE — PROGRESS NOTES
Congestive Heart Failure Clinic   [facility]       Referring Provider: Dr. Bradford  Primary Care Physician: Jnoas Luz DO   Cardiologist: Dr. Bradford/Tanesha Aceves  Nephrologist: N/A    HISTORY OF PRESENT ILLNESS:   Vivi Heaton is a 64 y.o. (1960) female with a history of HFimpEF, most recent EF:  Lab Results   Component Value Date    LVEF 60 10/20/2023       She presents to the CHF clinic for ongoing evaluation and monitoring of heart failure.    In the CHF clinic today she denies any adverse symptoms except:    [] Dizziness or lightheadedness   [] Syncope or near syncope  [] Recent Fall  [] Shortness of breath at rest   [] Dyspnea with exertion  [] Decline in functional capacity (unable to perform activities they had previously been able to do)  [] Fatigue   [] Orthopnea  [] PND  [] Nocturnal cough  [] Hemoptysis  [] Chest pain, pressure, or discomfort  [] Palpitations  [] Abdominal distention  [] Abdominal bloating  [] Early satiety  [] Blood in stool   [] Diarrhea  [] Constipation  [] Nausea/Vomiting  [] Decreased urinary response to oral diuretic   [] Scrotal swelling   [] Lower extremity edema  [] Used PRN doses of oral diuretic   [x] Weight gain 5lbs since 11/2024    Wt Readings from Last 3 Encounters:   05/06/25 97.1 kg (214 lb)   03/13/25 95.3 kg (210 lb)   01/30/25 95.2 kg (209 lb 12.8 oz)       SOCIAL HISTORY:  [x] Denies tobacco, alcohol or illicit drug abuse  [] Tobacco use:  [] ETOH use:  [] Illicit drug use:        MEDICATIONS:    No Known Allergies    Current Outpatient Medications:     empagliflozin (JARDIANCE) 10 MG tablet, Take 1 tablet by mouth daily, Disp: 90 tablet, Rfl: 1    levothyroxine (SYNTHROID) 50 MCG tablet, Take 1 tablet by mouth daily, Disp: 90 tablet, Rfl: 1    rosuvastatin (CRESTOR) 20 MG tablet, Take 1 tablet by mouth daily, Disp: 90 tablet, Rfl: 1    spironolactone (ALDACTONE) 25 MG tablet, Take 1 tablet by mouth daily, Disp:

## 2025-05-08 ENCOUNTER — HOSPITAL ENCOUNTER (OUTPATIENT)
Dept: INFUSION THERAPY | Age: 65
Discharge: HOME | End: 2025-05-08
Payer: MEDICARE

## 2025-05-08 ENCOUNTER — OFFICE VISIT (OUTPATIENT)
Dept: ONCOLOGY | Age: 65
End: 2025-05-08
Payer: MEDICARE

## 2025-05-08 VITALS
OXYGEN SATURATION: 94 % | WEIGHT: 214.9 LBS | HEIGHT: 60 IN | HEART RATE: 70 BPM | DIASTOLIC BLOOD PRESSURE: 72 MMHG | TEMPERATURE: 97.1 F | SYSTOLIC BLOOD PRESSURE: 128 MMHG | BODY MASS INDEX: 42.19 KG/M2

## 2025-05-08 DIAGNOSIS — C90.00 MULTIPLE MYELOMA, REMISSION STATUS UNSPECIFIED (HCC): Primary | ICD-10-CM

## 2025-05-08 DIAGNOSIS — C18.2 MALIGNANT NEOPLASM OF ASCENDING COLON (HCC): Primary | ICD-10-CM

## 2025-05-08 LAB
ALBUMIN SERPL-MCNC: 3.7 G/DL (ref 3.5–5.2)
ALP SERPL-CCNC: 101 U/L (ref 35–104)
ALT SERPL-CCNC: 13 U/L (ref 0–32)
ANION GAP SERPL CALCULATED.3IONS-SCNC: 11 MMOL/L (ref 7–16)
AST SERPL-CCNC: 13 U/L (ref 0–31)
BASOPHILS # BLD: 0.04 K/UL (ref 0–0.2)
BASOPHILS NFR BLD: 1 % (ref 0–2)
BILIRUB SERPL-MCNC: 0.3 MG/DL (ref 0–1.2)
BUN SERPL-MCNC: 21 MG/DL (ref 6–23)
CALCIUM SERPL-MCNC: 8.9 MG/DL (ref 8.6–10.2)
CEA SERPL-MCNC: 3.5 NG/ML (ref 0–5.2)
CHLORIDE SERPL-SCNC: 100 MMOL/L (ref 98–107)
CO2 SERPL-SCNC: 25 MMOL/L (ref 22–29)
CREAT SERPL-MCNC: 1 MG/DL (ref 0.5–1)
EOSINOPHIL # BLD: 0.09 K/UL (ref 0.05–0.5)
EOSINOPHILS RELATIVE PERCENT: 1 % (ref 0–6)
ERYTHROCYTE [DISTWIDTH] IN BLOOD BY AUTOMATED COUNT: 16.2 % (ref 11.5–15)
GFR, ESTIMATED: 64 ML/MIN/1.73M2
GLUCOSE SERPL-MCNC: 140 MG/DL (ref 74–99)
HCT VFR BLD AUTO: 43.9 % (ref 34–48)
HGB BLD-MCNC: 14.4 G/DL (ref 11.5–15.5)
IMM GRANULOCYTES # BLD AUTO: <0.03 K/UL (ref 0–0.58)
IMM GRANULOCYTES NFR BLD: 0 % (ref 0–5)
LYMPHOCYTES NFR BLD: 2.51 K/UL (ref 1.5–4)
LYMPHOCYTES RELATIVE PERCENT: 39 % (ref 20–42)
MCH RBC QN AUTO: 29 PG (ref 26–35)
MCHC RBC AUTO-ENTMCNC: 32.8 G/DL (ref 32–34.5)
MCV RBC AUTO: 88.3 FL (ref 80–99.9)
MONOCYTES NFR BLD: 0.73 K/UL (ref 0.1–0.95)
MONOCYTES NFR BLD: 11 % (ref 2–12)
NEUTROPHILS NFR BLD: 47 % (ref 43–80)
NEUTS SEG NFR BLD: 3.02 K/UL (ref 1.8–7.3)
PLATELET # BLD AUTO: 318 K/UL (ref 130–450)
PMV BLD AUTO: 10.5 FL (ref 7–12)
POTASSIUM SERPL-SCNC: 4.6 MMOL/L (ref 3.5–5)
PROT SERPL-MCNC: 9.4 G/DL (ref 6.4–8.3)
RBC # BLD AUTO: 4.97 M/UL (ref 3.5–5.5)
SODIUM SERPL-SCNC: 136 MMOL/L (ref 132–146)
WBC OTHER # BLD: 6.4 K/UL (ref 4.5–11.5)

## 2025-05-08 PROCEDURE — 82378 CARCINOEMBRYONIC ANTIGEN: CPT

## 2025-05-08 PROCEDURE — 36415 COLL VENOUS BLD VENIPUNCTURE: CPT

## 2025-05-08 PROCEDURE — 80053 COMPREHEN METABOLIC PANEL: CPT

## 2025-05-08 PROCEDURE — 99212 OFFICE O/P EST SF 10 MIN: CPT

## 2025-05-08 PROCEDURE — 85025 COMPLETE CBC W/AUTO DIFF WBC: CPT

## 2025-05-08 NOTE — PROGRESS NOTES
[facility]  [address] [place] [address]   Hematology/Oncology  Consult      Patient Name: Vivi Heaton  YOB: 1960  PCP: Jonas Luz DO   Referring Provider:      Reason for Consultation:   Chief Complaint   Patient presents with    Follow-up     Malignant neoplasm of ascending colon (HCC)          Interval history: No new complaints.     History of Present Illness:  64 years old female referred by Dr. Bradford, for abnormal SPEP.  She was admitted in August 2022 with congestive heart failure and NSTEMI.  Found to have coronary artery disease, EF of 10%, left atrial thrombus.  On GDMT and Eliquis and LifeVest.  Serum immunofixation showed monoclonal IgG G kappa.  M spike 2.4.  Serum free kappa was 22, lambda 14 with a normal ratio of 1.5.  Random UPEP also showed monoclonal IgG kappa.  Chemistry from 2 weeks ago show creatinine of 1.2, calcium 9.4.  No recent hemoglobin available.  Patient denies any bony pain.  Cardiac MRI has been ordered to rule out cardiac amyloidosis.    S/p bone marrow biopsy February 2023. Pathology showed 30% kappa restricted plasma cells, normal female karyotype.  FISH showed 13 monosomy as well as IgH abnormality.  IgH rearrangement analysis could not be done due to limited specimen.    Hemoglobin creatinine calcium normal.  No bony pain.    Skeletal survey survey showed no lytic lesions.  Left sacroiliac sclerotic lesion was noted.  CT pelvis did not show any bone lesion      Found to have polyps on a screening colonoscopy by Dr. Hogan in January 2024.  Biopsy came back positive for adenocarcinoma.  Patient s/p right hemicolectomy at [facility] on February 28, 2024.  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  Lymphovascular invasion and perineural invasion were noted.  CT chest 7 pelvis were negative for

## 2025-06-17 ENCOUNTER — OFFICE VISIT (OUTPATIENT)
Dept: FAMILY MEDICINE CLINIC | Age: 65
End: 2025-06-17
Payer: MEDICARE

## 2025-06-17 VITALS
WEIGHT: 215 LBS | BODY MASS INDEX: 42.21 KG/M2 | RESPIRATION RATE: 18 BRPM | HEIGHT: 60 IN | OXYGEN SATURATION: 95 % | HEART RATE: 74 BPM | SYSTOLIC BLOOD PRESSURE: 128 MMHG | DIASTOLIC BLOOD PRESSURE: 78 MMHG

## 2025-06-17 DIAGNOSIS — I10 PRIMARY HYPERTENSION: ICD-10-CM

## 2025-06-17 DIAGNOSIS — Z12.31 ENCOUNTER FOR SCREENING MAMMOGRAM FOR MALIGNANT NEOPLASM OF BREAST: ICD-10-CM

## 2025-06-17 DIAGNOSIS — I25.10 CAD IN NATIVE ARTERY: ICD-10-CM

## 2025-06-17 DIAGNOSIS — E11.59 TYPE 2 DIABETES MELLITUS WITH OTHER CIRCULATORY COMPLICATION, WITHOUT LONG-TERM CURRENT USE OF INSULIN (HCC): Primary | ICD-10-CM

## 2025-06-17 PROBLEM — E11.9 DIABETES MELLITUS (HCC): Status: ACTIVE | Noted: 2025-06-17

## 2025-06-17 LAB — HBA1C MFR BLD: 7 %

## 2025-06-17 PROCEDURE — 99214 OFFICE O/P EST MOD 30 MIN: CPT | Performed by: FAMILY MEDICINE

## 2025-06-17 PROCEDURE — 83036 HEMOGLOBIN GLYCOSYLATED A1C: CPT | Performed by: FAMILY MEDICINE

## 2025-06-17 PROCEDURE — 1124F ACP DISCUSS-NO DSCNMKR DOCD: CPT | Performed by: FAMILY MEDICINE

## 2025-06-17 PROCEDURE — 1160F RVW MEDS BY RX/DR IN RCRD: CPT | Performed by: FAMILY MEDICINE

## 2025-06-17 PROCEDURE — 3051F HG A1C>EQUAL 7.0%<8.0%: CPT | Performed by: FAMILY MEDICINE

## 2025-06-17 PROCEDURE — 3078F DIAST BP <80 MM HG: CPT | Performed by: FAMILY MEDICINE

## 2025-06-17 PROCEDURE — 1159F MED LIST DOCD IN RCRD: CPT | Performed by: FAMILY MEDICINE

## 2025-06-17 PROCEDURE — 3074F SYST BP LT 130 MM HG: CPT | Performed by: FAMILY MEDICINE

## 2025-06-17 PROCEDURE — G2211 COMPLEX E/M VISIT ADD ON: HCPCS | Performed by: FAMILY MEDICINE

## 2025-06-17 NOTE — PROGRESS NOTES
2025    Vivi Heaton    Chief Complaint   Patient presents with    Hypertension    Hypothyroidism    [facility]  History was obtained from patient.  Vivi is a 64 y.o. female who presents today with the followin. Type 2 diabetes mellitus with other circulatory complication, without long-term current use of insulin ([facility])    2. Encounter for screening mammogram for malignant neoplasm of breast    3. CAD in native artery    4. Primary hypertension    Patient presents for follow-up of type 2 diabetes coronary artery disease and hypertension.  Patient states she is taking all of her medication as prescribed.  Patient states that she feels great.     REVIEW OF SYMPTOMS    Review of Systems   Constitutional:  Negative for chills, fatigue and fever.   HENT:  Negative for congestion, mouth sores, postnasal drip, rhinorrhea and sinus pain.    Eyes:  Negative for photophobia, discharge and redness.   Respiratory:  Negative for cough and shortness of breath.    Cardiovascular:  Negative for chest pain.   Gastrointestinal: Negative.    Genitourinary:  Negative for difficulty urinating, dysuria, hematuria and urgency.   Neurological:  Negative for headaches.   Psychiatric/Behavioral:  Negative for sleep disturbance.        PAST MEDICAL HISTORY  Past Medical History:   Diagnosis Date    CHF (congestive heart failure) ([facility])     Hyperlipidemia     Hypertension     Hypothyroidism 2022       FAMILY HISTORY  Family History   Problem Relation Age of Onset    Heart Failure Mother          at age 86 years, was a smoker    Atrial Fibrillation Mother     Heart Failure Father          at age 69, was a smoker    Diabetes Father     Heart Attack Sister          at age 53 years, smoked    Arthritis Sister         smoked    Diabetes type 2  Brother         smoked    Diabetes type 2  Brother         smoked    Obesity Brother     Diabetes Brother         did not smoke

## 2025-06-30 RX ORDER — SACUBITRIL AND VALSARTAN 97; 103 MG/1; MG/1
1 TABLET, FILM COATED ORAL 2 TIMES DAILY
Qty: 180 TABLET | Refills: 3 | Status: SHIPPED | OUTPATIENT
Start: 2025-06-30

## 2025-07-02 ENCOUNTER — TELEPHONE (OUTPATIENT)
Dept: FAMILY MEDICINE CLINIC | Age: 65
End: 2025-07-02

## 2025-07-14 ENCOUNTER — TELEPHONE (OUTPATIENT)
Dept: FAMILY MEDICINE CLINIC | Age: 65
End: 2025-07-14

## 2025-08-04 ENCOUNTER — HOSPITAL ENCOUNTER (OUTPATIENT)
Dept: MAMMOGRAPHY | Age: 65
Discharge: HOME | End: 2025-08-06
Payer: MEDICARE

## 2025-08-04 DIAGNOSIS — Z12.31 ENCOUNTER FOR SCREENING MAMMOGRAM FOR MALIGNANT NEOPLASM OF BREAST: ICD-10-CM

## 2025-08-04 PROCEDURE — 77063 BREAST TOMOSYNTHESIS BI: CPT

## 2025-08-07 ENCOUNTER — HOSPITAL ENCOUNTER (OUTPATIENT)
Dept: INFUSION THERAPY | Age: 65
Discharge: HOME | End: 2025-08-07
Payer: MEDICARE

## 2025-08-07 DIAGNOSIS — C18.2 MALIGNANT NEOPLASM OF ASCENDING COLON (HCC): ICD-10-CM

## 2025-08-07 LAB
ALBUMIN SERPL-MCNC: 3.6 G/DL (ref 3.5–5.2)
ALP SERPL-CCNC: 93 U/L (ref 35–104)
ALT SERPL-CCNC: 13 U/L (ref 0–35)
ANION GAP SERPL CALCULATED.3IONS-SCNC: 10 MMOL/L (ref 7–16)
AST SERPL-CCNC: 16 U/L (ref 0–35)
BASOPHILS # BLD: 0.04 K/UL (ref 0–0.2)
BASOPHILS NFR BLD: 1 % (ref 0–2)
BILIRUB SERPL-MCNC: 0.4 MG/DL (ref 0–1.2)
BUN SERPL-MCNC: 16 MG/DL (ref 8–23)
CALCIUM SERPL-MCNC: 9.4 MG/DL (ref 8.8–10.2)
CEA SERPL-MCNC: 3.6 NG/ML (ref 0–5.2)
CHLORIDE SERPL-SCNC: 104 MMOL/L (ref 98–107)
CO2 SERPL-SCNC: 24 MMOL/L (ref 22–29)
CREAT SERPL-MCNC: 0.8 MG/DL (ref 0.5–1)
EOSINOPHIL # BLD: 0.07 K/UL (ref 0.05–0.5)
EOSINOPHILS RELATIVE PERCENT: 1 % (ref 0–6)
ERYTHROCYTE [DISTWIDTH] IN BLOOD BY AUTOMATED COUNT: 15.4 % (ref 11.5–15)
FREE KAPPA/LAMBDA RATIO: 2.68 (ref 0.22–1.74)
GFR, ESTIMATED: 80 ML/MIN/1.73M2
GLUCOSE SERPL-MCNC: 129 MG/DL (ref 74–99)
HCT VFR BLD AUTO: 44.5 % (ref 34–48)
HGB BLD-MCNC: 14.2 G/DL (ref 11.5–15.5)
IMM GRANULOCYTES # BLD AUTO: <0.03 K/UL (ref 0–0.58)
IMM GRANULOCYTES NFR BLD: 0 % (ref 0–5)
KAPPA LC FREE SER-MCNC: 58.1 MG/L
LAMBDA LC FREE SERPL-MCNC: 21.7 MG/L (ref 4.2–27.7)
LYMPHOCYTES NFR BLD: 1.86 K/UL (ref 1.5–4)
LYMPHOCYTES RELATIVE PERCENT: 32 % (ref 20–42)
MCH RBC QN AUTO: 28 PG (ref 26–35)
MCHC RBC AUTO-ENTMCNC: 31.9 G/DL (ref 32–34.5)
MCV RBC AUTO: 87.6 FL (ref 80–99.9)
MONOCYTES NFR BLD: 0.65 K/UL (ref 0.1–0.95)
MONOCYTES NFR BLD: 11 % (ref 2–12)
NEUTROPHILS NFR BLD: 55 % (ref 43–80)
NEUTS SEG NFR BLD: 3.24 K/UL (ref 1.8–7.3)
PLATELET # BLD AUTO: 285 K/UL (ref 130–450)
PMV BLD AUTO: 11.8 FL (ref 7–12)
POTASSIUM SERPL-SCNC: 4.4 MMOL/L (ref 3.5–5.1)
PROT SERPL-MCNC: 9.1 G/DL (ref 6.4–8.3)
RBC # BLD AUTO: 5.08 M/UL (ref 3.5–5.5)
SODIUM SERPL-SCNC: 138 MMOL/L (ref 136–145)
WBC OTHER # BLD: 5.9 K/UL (ref 4.5–11.5)

## 2025-08-07 PROCEDURE — 84155 ASSAY OF PROTEIN SERUM: CPT

## 2025-08-07 PROCEDURE — 82378 CARCINOEMBRYONIC ANTIGEN: CPT

## 2025-08-07 PROCEDURE — 36415 COLL VENOUS BLD VENIPUNCTURE: CPT

## 2025-08-07 PROCEDURE — 86334 IMMUNOFIX E-PHORESIS SERUM: CPT

## 2025-08-07 PROCEDURE — 84165 PROTEIN E-PHORESIS SERUM: CPT

## 2025-08-07 PROCEDURE — 83521 IG LIGHT CHAINS FREE EACH: CPT

## 2025-08-07 PROCEDURE — 80053 COMPREHEN METABOLIC PANEL: CPT

## 2025-08-07 PROCEDURE — 85025 COMPLETE CBC W/AUTO DIFF WBC: CPT

## 2025-08-08 LAB
ALBUMIN SERPL-MCNC: 3.6 G/DL (ref 3.5–4.7)
ALPHA1 GLOB SERPL ELPH-MCNC: 0.3 G/DL (ref 0.2–0.4)
ALPHA2 GLOB SERPL ELPH-MCNC: 0.8 G/DL (ref 0.5–1)
B-GLOBULIN SERPL ELPH-MCNC: 1.3 G/DL (ref 0.8–1.3)
GAMMA GLOB SERPL ELPH-MCNC: 2.7 G/DL (ref 0.7–1.6)
INTERPRETATION SERPL IFE-IMP: NORMAL
M PROTEIN SERPL ELPH-MCNC: 2.4 G/DL
PATH REV: NORMAL
PATHOLOGIST: ABNORMAL
PROT PATTERN SERPL ELPH-IMP: ABNORMAL
PROT SERPL-MCNC: 8.7 G/DL (ref 6.4–8.3)

## 2025-08-14 ENCOUNTER — OFFICE VISIT (OUTPATIENT)
Dept: ONCOLOGY | Age: 65
End: 2025-08-14

## 2025-08-14 VITALS
DIASTOLIC BLOOD PRESSURE: 71 MMHG | TEMPERATURE: 97.2 F | OXYGEN SATURATION: 100 % | SYSTOLIC BLOOD PRESSURE: 120 MMHG | HEIGHT: 60 IN | HEART RATE: 69 BPM | WEIGHT: 215.2 LBS | BODY MASS INDEX: 42.25 KG/M2

## 2025-08-14 DIAGNOSIS — C18.2 MALIGNANT NEOPLASM OF ASCENDING COLON (HCC): Primary | ICD-10-CM

## 2025-08-31 RX ORDER — METOPROLOL SUCCINATE 25 MG/1
25 TABLET, EXTENDED RELEASE ORAL DAILY
Qty: 90 TABLET | Refills: 1 | Status: SHIPPED | OUTPATIENT
Start: 2025-08-31